# Patient Record
Sex: FEMALE | Race: WHITE | Employment: UNEMPLOYED | URBAN - METROPOLITAN AREA
[De-identification: names, ages, dates, MRNs, and addresses within clinical notes are randomized per-mention and may not be internally consistent; named-entity substitution may affect disease eponyms.]

---

## 2017-01-16 DIAGNOSIS — F41.1 GAD (GENERALIZED ANXIETY DISORDER): ICD-10-CM

## 2017-01-16 RX ORDER — GABAPENTIN 300 MG/1
CAPSULE ORAL
Qty: 60 CAPSULE | Refills: 5 | Status: SHIPPED | OUTPATIENT
Start: 2017-01-16 | End: 2017-02-20 | Stop reason: ALTCHOICE

## 2017-01-16 RX ORDER — BUPROPION HYDROCHLORIDE 150 MG/1
150 TABLET ORAL EVERY MORNING
Qty: 30 TABLET | Refills: 11 | Status: SHIPPED | OUTPATIENT
Start: 2017-01-16 | End: 2017-04-20 | Stop reason: SDUPTHER

## 2017-02-20 ENCOUNTER — PATIENT MESSAGE (OUTPATIENT)
Dept: FAMILY MEDICINE CLINIC | Age: 37
End: 2017-02-20

## 2017-02-20 ENCOUNTER — OFFICE VISIT (OUTPATIENT)
Dept: FAMILY MEDICINE CLINIC | Age: 37
End: 2017-02-20

## 2017-02-20 VITALS
HEART RATE: 76 BPM | SYSTOLIC BLOOD PRESSURE: 108 MMHG | HEIGHT: 60 IN | RESPIRATION RATE: 16 BRPM | BODY MASS INDEX: 32.89 KG/M2 | DIASTOLIC BLOOD PRESSURE: 60 MMHG | TEMPERATURE: 98 F | WEIGHT: 167.5 LBS

## 2017-02-20 DIAGNOSIS — J42 CHRONIC BRONCHITIS, UNSPECIFIED CHRONIC BRONCHITIS TYPE (HCC): ICD-10-CM

## 2017-02-20 DIAGNOSIS — F17.200 SMOKING ADDICTION: ICD-10-CM

## 2017-02-20 DIAGNOSIS — F41.1 GAD (GENERALIZED ANXIETY DISORDER): ICD-10-CM

## 2017-02-20 DIAGNOSIS — J32.0 CHRONIC MAXILLARY SINUSITIS: ICD-10-CM

## 2017-02-20 DIAGNOSIS — J34.9 PARANASAL SINUS DISEASE: ICD-10-CM

## 2017-02-20 DIAGNOSIS — M54.2 NECK PAIN: ICD-10-CM

## 2017-02-20 PROCEDURE — 99214 OFFICE O/P EST MOD 30 MIN: CPT | Performed by: NURSE PRACTITIONER

## 2017-02-20 RX ORDER — ALBUTEROL SULFATE 90 UG/1
2 AEROSOL, METERED RESPIRATORY (INHALATION) EVERY 6 HOURS PRN
Qty: 1 INHALER | Refills: 3 | Status: SHIPPED | OUTPATIENT
Start: 2017-02-20 | End: 2017-09-05 | Stop reason: SDUPTHER

## 2017-02-20 RX ORDER — BUDESONIDE AND FORMOTEROL FUMARATE DIHYDRATE 80; 4.5 UG/1; UG/1
2 AEROSOL RESPIRATORY (INHALATION) 2 TIMES DAILY
Qty: 1 INHALER | Refills: 11 | Status: SHIPPED | OUTPATIENT
Start: 2017-02-20 | End: 2018-05-29 | Stop reason: SDUPTHER

## 2017-02-20 RX ORDER — GABAPENTIN 800 MG/1
800 TABLET ORAL 2 TIMES DAILY
Qty: 60 TABLET | Refills: 2 | Status: SHIPPED | OUTPATIENT
Start: 2017-02-20 | End: 2017-03-20 | Stop reason: SDUPTHER

## 2017-02-20 RX ORDER — ETODOLAC 400 MG/1
400 TABLET, FILM COATED ORAL 2 TIMES DAILY
Qty: 30 TABLET | Refills: 2 | Status: SHIPPED | OUTPATIENT
Start: 2017-02-20 | End: 2017-06-26

## 2017-02-20 RX ORDER — KETOROLAC TROMETHAMINE 10 MG/1
10 TABLET, FILM COATED ORAL EVERY 6 HOURS PRN
Qty: 20 TABLET | Refills: 0 | Status: SHIPPED | OUTPATIENT
Start: 2017-02-20 | End: 2017-06-05 | Stop reason: SDUPTHER

## 2017-02-20 ASSESSMENT — ENCOUNTER SYMPTOMS
SHORTNESS OF BREATH: 0
COUGH: 0
NAUSEA: 0
ABDOMINAL PAIN: 0
VOMITING: 0

## 2017-02-21 RX ORDER — PSEUDOEPHEDRINE HCL 120 MG/1
120 TABLET, FILM COATED, EXTENDED RELEASE ORAL EVERY 12 HOURS
Qty: 28 TABLET | Refills: 0 | Status: SHIPPED | OUTPATIENT
Start: 2017-02-21 | End: 2017-03-17 | Stop reason: SDUPTHER

## 2017-03-02 ENCOUNTER — PATIENT MESSAGE (OUTPATIENT)
Dept: FAMILY MEDICINE CLINIC | Age: 37
End: 2017-03-02

## 2017-03-02 DIAGNOSIS — K04.7 DENTAL INFECTION: Primary | ICD-10-CM

## 2017-03-02 RX ORDER — AMOXICILLIN 875 MG/1
875 TABLET, COATED ORAL 2 TIMES DAILY
Qty: 20 TABLET | Refills: 0 | Status: SHIPPED | OUTPATIENT
Start: 2017-03-02 | End: 2017-03-12

## 2017-03-08 RX ORDER — KETOROLAC TROMETHAMINE 10 MG/1
10 TABLET, FILM COATED ORAL EVERY 6 HOURS PRN
Qty: 20 TABLET | Refills: 0 | OUTPATIENT
Start: 2017-03-08 | End: 2018-03-08

## 2017-03-17 ENCOUNTER — PATIENT MESSAGE (OUTPATIENT)
Dept: FAMILY MEDICINE CLINIC | Age: 37
End: 2017-03-17

## 2017-03-17 DIAGNOSIS — J32.0 CHRONIC MAXILLARY SINUSITIS: ICD-10-CM

## 2017-03-17 RX ORDER — PSEUDOEPHEDRINE HCL 120 MG/1
120 TABLET, FILM COATED, EXTENDED RELEASE ORAL EVERY 12 HOURS
Qty: 28 TABLET | Refills: 0 | Status: SHIPPED | OUTPATIENT
Start: 2017-03-17 | End: 2017-03-31

## 2017-03-20 RX ORDER — GABAPENTIN 800 MG/1
800 TABLET ORAL 2 TIMES DAILY
Qty: 60 TABLET | Refills: 11 | Status: SHIPPED | OUTPATIENT
Start: 2017-03-20 | End: 2017-07-06 | Stop reason: SDUPTHER

## 2017-03-30 ENCOUNTER — OFFICE VISIT (OUTPATIENT)
Dept: OTOLARYNGOLOGY | Age: 37
End: 2017-03-30

## 2017-03-30 VITALS
HEIGHT: 60 IN | SYSTOLIC BLOOD PRESSURE: 102 MMHG | BODY MASS INDEX: 30.82 KG/M2 | TEMPERATURE: 98.5 F | DIASTOLIC BLOOD PRESSURE: 78 MMHG | HEART RATE: 56 BPM | RESPIRATION RATE: 20 BRPM | WEIGHT: 157 LBS

## 2017-03-30 DIAGNOSIS — T48.5X5A RHINITIS MEDICAMENTOSA: ICD-10-CM

## 2017-03-30 DIAGNOSIS — J32.2 CHRONIC ETHMOIDAL SINUSITIS: Primary | ICD-10-CM

## 2017-03-30 DIAGNOSIS — J34.3 NASAL TURBINATE HYPERTROPHY: ICD-10-CM

## 2017-03-30 DIAGNOSIS — J31.0 RHINITIS MEDICAMENTOSA: ICD-10-CM

## 2017-03-30 DIAGNOSIS — J30.2 SEASONAL ALLERGIC RHINITIS, UNSPECIFIED ALLERGIC RHINITIS TRIGGER: ICD-10-CM

## 2017-03-30 DIAGNOSIS — F17.200 TOBACCO DEPENDENCE: ICD-10-CM

## 2017-03-30 DIAGNOSIS — K21.9 GASTROESOPHAGEAL REFLUX DISEASE WITHOUT ESOPHAGITIS: ICD-10-CM

## 2017-03-30 DIAGNOSIS — J32.0 CHRONIC MAXILLARY SINUSITIS: ICD-10-CM

## 2017-03-30 DIAGNOSIS — J32.1 CHRONIC FRONTAL SINUSITIS: ICD-10-CM

## 2017-03-30 PROCEDURE — 99243 OFF/OP CNSLTJ NEW/EST LOW 30: CPT | Performed by: OTOLARYNGOLOGY

## 2017-03-30 RX ORDER — AMOXICILLIN AND CLAVULANATE POTASSIUM 875; 125 MG/1; MG/1
1 TABLET, FILM COATED ORAL 2 TIMES DAILY
Qty: 56 TABLET | Refills: 0 | Status: SHIPPED | OUTPATIENT
Start: 2017-03-30 | End: 2017-04-27

## 2017-03-30 RX ORDER — FLUTICASONE PROPIONATE 50 MCG
1 SPRAY, SUSPENSION (ML) NASAL DAILY
Qty: 1 BOTTLE | Refills: 3 | Status: SHIPPED | OUTPATIENT
Start: 2017-03-30 | End: 2017-07-06 | Stop reason: SDUPTHER

## 2017-03-30 ASSESSMENT — ENCOUNTER SYMPTOMS
ABDOMINAL PAIN: 0
VOICE CHANGE: 0
STRIDOR: 0
CHEST TIGHTNESS: 0
VOMITING: 1
CHOKING: 0
DIARRHEA: 1
APNEA: 0
NAUSEA: 1
FACIAL SWELLING: 1
COLOR CHANGE: 0
WHEEZING: 1
SHORTNESS OF BREATH: 1
TROUBLE SWALLOWING: 0
SINUS PRESSURE: 1
RHINORRHEA: 1
COUGH: 0

## 2017-04-19 ENCOUNTER — PATIENT MESSAGE (OUTPATIENT)
Dept: FAMILY MEDICINE CLINIC | Age: 37
End: 2017-04-19

## 2017-04-19 DIAGNOSIS — F41.1 GAD (GENERALIZED ANXIETY DISORDER): ICD-10-CM

## 2017-04-20 RX ORDER — BUPROPION HYDROCHLORIDE 300 MG/1
300 TABLET ORAL EVERY MORNING
Qty: 30 TABLET | Refills: 5 | Status: SHIPPED | OUTPATIENT
Start: 2017-04-20 | End: 2017-09-05 | Stop reason: SDUPTHER

## 2017-05-19 ENCOUNTER — OFFICE VISIT (OUTPATIENT)
Dept: OTOLARYNGOLOGY | Age: 37
End: 2017-05-19

## 2017-05-19 VITALS
WEIGHT: 159.2 LBS | HEART RATE: 72 BPM | DIASTOLIC BLOOD PRESSURE: 80 MMHG | RESPIRATION RATE: 28 BRPM | BODY MASS INDEX: 31.25 KG/M2 | TEMPERATURE: 98.1 F | HEIGHT: 60 IN | SYSTOLIC BLOOD PRESSURE: 104 MMHG

## 2017-05-19 DIAGNOSIS — T48.5X5A RHINITIS MEDICAMENTOSA: ICD-10-CM

## 2017-05-19 DIAGNOSIS — J31.0 RHINITIS MEDICAMENTOSA: ICD-10-CM

## 2017-05-19 DIAGNOSIS — J34.3 NASAL TURBINATE HYPERTROPHY: ICD-10-CM

## 2017-05-19 DIAGNOSIS — K21.9 GASTROESOPHAGEAL REFLUX DISEASE WITHOUT ESOPHAGITIS: ICD-10-CM

## 2017-05-19 DIAGNOSIS — J32.1 CHRONIC FRONTAL SINUSITIS: ICD-10-CM

## 2017-05-19 DIAGNOSIS — F17.200 TOBACCO DEPENDENCE: ICD-10-CM

## 2017-05-19 DIAGNOSIS — J34.89 CONCHA BULLOSA: ICD-10-CM

## 2017-05-19 DIAGNOSIS — J32.2 CHRONIC ETHMOIDAL SINUSITIS: Primary | ICD-10-CM

## 2017-05-19 DIAGNOSIS — J32.0 CHRONIC MAXILLARY SINUSITIS: ICD-10-CM

## 2017-05-19 DIAGNOSIS — J30.2 SEASONAL ALLERGIC RHINITIS, UNSPECIFIED ALLERGIC RHINITIS TRIGGER: ICD-10-CM

## 2017-05-19 PROCEDURE — 99214 OFFICE O/P EST MOD 30 MIN: CPT | Performed by: OTOLARYNGOLOGY

## 2017-05-19 ASSESSMENT — ENCOUNTER SYMPTOMS
FACIAL SWELLING: 0
APNEA: 0
VOMITING: 0
SORE THROAT: 0
SHORTNESS OF BREATH: 0
DIARRHEA: 0
COLOR CHANGE: 0
VOICE CHANGE: 0
ABDOMINAL PAIN: 0
WHEEZING: 0
CHOKING: 0
SINUS PRESSURE: 0
TROUBLE SWALLOWING: 0
CHEST TIGHTNESS: 0
STRIDOR: 0
RHINORRHEA: 0
COUGH: 0
NAUSEA: 0

## 2017-06-05 RX ORDER — KETOROLAC TROMETHAMINE 10 MG/1
10 TABLET, FILM COATED ORAL EVERY 6 HOURS PRN
Qty: 20 TABLET | Refills: 0 | Status: SHIPPED | OUTPATIENT
Start: 2017-06-05 | End: 2017-07-06 | Stop reason: SDUPTHER

## 2017-06-26 ENCOUNTER — OFFICE VISIT (OUTPATIENT)
Dept: FAMILY MEDICINE CLINIC | Age: 37
End: 2017-06-26

## 2017-06-26 VITALS
RESPIRATION RATE: 16 BRPM | SYSTOLIC BLOOD PRESSURE: 92 MMHG | TEMPERATURE: 97.7 F | HEIGHT: 60 IN | HEART RATE: 89 BPM | DIASTOLIC BLOOD PRESSURE: 66 MMHG | BODY MASS INDEX: 32.98 KG/M2 | WEIGHT: 168 LBS | OXYGEN SATURATION: 97 %

## 2017-06-26 DIAGNOSIS — Z01.818 PREOP EXAMINATION: Primary | ICD-10-CM

## 2017-06-26 DIAGNOSIS — F17.200 SMOKING ADDICTION: ICD-10-CM

## 2017-06-26 DIAGNOSIS — J34.9 SINUS DISEASE: ICD-10-CM

## 2017-06-26 DIAGNOSIS — F31.60 BIPOLAR 1 DISORDER, MIXED (HCC): ICD-10-CM

## 2017-06-26 DIAGNOSIS — F41.1 GAD (GENERALIZED ANXIETY DISORDER): ICD-10-CM

## 2017-06-26 DIAGNOSIS — J42 CHRONIC BRONCHITIS, UNSPECIFIED CHRONIC BRONCHITIS TYPE (HCC): ICD-10-CM

## 2017-06-26 PROCEDURE — 99242 OFF/OP CONSLTJ NEW/EST SF 20: CPT | Performed by: NURSE PRACTITIONER

## 2017-06-26 RX ORDER — RISPERIDONE 2 MG/1
2 TABLET, FILM COATED ORAL DAILY
Qty: 30 TABLET | Refills: 1 | Status: SHIPPED | OUTPATIENT
Start: 2017-06-26 | End: 2017-06-30

## 2017-06-26 ASSESSMENT — ENCOUNTER SYMPTOMS
SHORTNESS OF BREATH: 0
RHINORRHEA: 1
COUGH: 0
ABDOMINAL PAIN: 0
NAUSEA: 0

## 2017-06-30 ENCOUNTER — OFFICE VISIT (OUTPATIENT)
Dept: OTOLARYNGOLOGY | Age: 37
End: 2017-06-30

## 2017-06-30 VITALS
SYSTOLIC BLOOD PRESSURE: 108 MMHG | WEIGHT: 163.3 LBS | TEMPERATURE: 98.4 F | BODY MASS INDEX: 32.06 KG/M2 | DIASTOLIC BLOOD PRESSURE: 80 MMHG | HEIGHT: 60 IN | HEART RATE: 84 BPM | RESPIRATION RATE: 16 BRPM

## 2017-06-30 DIAGNOSIS — J30.2 SEASONAL ALLERGIC RHINITIS, UNSPECIFIED ALLERGIC RHINITIS TRIGGER: ICD-10-CM

## 2017-06-30 DIAGNOSIS — T48.5X5A RHINITIS MEDICAMENTOSA: ICD-10-CM

## 2017-06-30 DIAGNOSIS — J32.1 CHRONIC FRONTAL SINUSITIS: ICD-10-CM

## 2017-06-30 DIAGNOSIS — J32.0 CHRONIC MAXILLARY SINUSITIS: ICD-10-CM

## 2017-06-30 DIAGNOSIS — J31.0 RHINITIS MEDICAMENTOSA: ICD-10-CM

## 2017-06-30 DIAGNOSIS — K21.9 GASTROESOPHAGEAL REFLUX DISEASE WITHOUT ESOPHAGITIS: ICD-10-CM

## 2017-06-30 DIAGNOSIS — J34.89 CONCHA BULLOSA: ICD-10-CM

## 2017-06-30 DIAGNOSIS — J32.2 CHRONIC ETHMOIDAL SINUSITIS: Primary | ICD-10-CM

## 2017-06-30 DIAGNOSIS — J34.3 NASAL TURBINATE HYPERTROPHY: ICD-10-CM

## 2017-06-30 DIAGNOSIS — F17.200 TOBACCO DEPENDENCE: ICD-10-CM

## 2017-06-30 PROCEDURE — 99999 PR OFFICE/OUTPT VISIT,PROCEDURE ONLY: CPT | Performed by: PHYSICIAN ASSISTANT

## 2017-06-30 ASSESSMENT — ENCOUNTER SYMPTOMS
WHEEZING: 0
RHINORRHEA: 0
BLOOD IN STOOL: 0
DIARRHEA: 0
VOMITING: 0
SINUS PRESSURE: 1
PHOTOPHOBIA: 0
RECTAL PAIN: 0
CHEST TIGHTNESS: 0
ANAL BLEEDING: 0
EYE PAIN: 0
VOICE CHANGE: 0
ABDOMINAL PAIN: 0
BACK PAIN: 0
ABDOMINAL DISTENTION: 0
STRIDOR: 0
SORE THROAT: 0
COUGH: 0
APNEA: 0
TROUBLE SWALLOWING: 0
NAUSEA: 0
COLOR CHANGE: 0
CONSTIPATION: 0
FACIAL SWELLING: 0
EYE ITCHING: 0
EYE REDNESS: 0
SHORTNESS OF BREATH: 0
CHOKING: 0
EYE DISCHARGE: 0

## 2017-07-06 DIAGNOSIS — G44.209 TENSION HEADACHE: ICD-10-CM

## 2017-07-06 RX ORDER — TIZANIDINE 4 MG/1
4 TABLET ORAL EVERY 8 HOURS PRN
Qty: 90 TABLET | Refills: 5 | Status: SHIPPED | OUTPATIENT
Start: 2017-07-06 | End: 2017-10-23 | Stop reason: SDUPTHER

## 2017-07-06 RX ORDER — GABAPENTIN 800 MG/1
800 TABLET ORAL 2 TIMES DAILY
Qty: 60 TABLET | Refills: 11 | Status: SHIPPED | OUTPATIENT
Start: 2017-07-06 | End: 2017-08-14 | Stop reason: SDUPTHER

## 2017-07-06 RX ORDER — KETOROLAC TROMETHAMINE 10 MG/1
10 TABLET, FILM COATED ORAL EVERY 6 HOURS PRN
Qty: 20 TABLET | Refills: 0 | Status: SHIPPED | OUTPATIENT
Start: 2017-07-06 | End: 2017-07-12 | Stop reason: HOSPADM

## 2017-07-06 RX ORDER — FLUTICASONE PROPIONATE 50 MCG
1 SPRAY, SUSPENSION (ML) NASAL DAILY
Qty: 1 BOTTLE | Refills: 3 | Status: SHIPPED | OUTPATIENT
Start: 2017-07-06 | End: 2017-07-12 | Stop reason: HOSPADM

## 2017-07-13 ENCOUNTER — OFFICE VISIT (OUTPATIENT)
Dept: OTOLARYNGOLOGY | Age: 37
End: 2017-07-13

## 2017-07-13 VITALS
BODY MASS INDEX: 31.5 KG/M2 | SYSTOLIC BLOOD PRESSURE: 120 MMHG | DIASTOLIC BLOOD PRESSURE: 72 MMHG | WEIGHT: 161.3 LBS | RESPIRATION RATE: 18 BRPM | HEART RATE: 78 BPM | TEMPERATURE: 98.1 F

## 2017-07-13 DIAGNOSIS — K21.9 GASTROESOPHAGEAL REFLUX DISEASE WITHOUT ESOPHAGITIS: ICD-10-CM

## 2017-07-13 DIAGNOSIS — J32.0 CHRONIC MAXILLARY SINUSITIS: ICD-10-CM

## 2017-07-13 DIAGNOSIS — J32.1 CHRONIC FRONTAL SINUSITIS: ICD-10-CM

## 2017-07-13 DIAGNOSIS — T48.5X5A RHINITIS MEDICAMENTOSA: ICD-10-CM

## 2017-07-13 DIAGNOSIS — J31.0 RHINITIS MEDICAMENTOSA: ICD-10-CM

## 2017-07-13 DIAGNOSIS — J34.89 CONCHA BULLOSA: ICD-10-CM

## 2017-07-13 DIAGNOSIS — J32.2 CHRONIC ETHMOIDAL SINUSITIS: Primary | ICD-10-CM

## 2017-07-13 DIAGNOSIS — J34.3 NASAL TURBINATE HYPERTROPHY: ICD-10-CM

## 2017-07-13 PROCEDURE — 99024 POSTOP FOLLOW-UP VISIT: CPT | Performed by: OTOLARYNGOLOGY

## 2017-07-13 ASSESSMENT — ENCOUNTER SYMPTOMS
FACIAL SWELLING: 0
STRIDOR: 0
SINUS PRESSURE: 0
RHINORRHEA: 0
SHORTNESS OF BREATH: 0
VOICE CHANGE: 0
ABDOMINAL PAIN: 0
CHOKING: 0
COLOR CHANGE: 0
CHEST TIGHTNESS: 0
NAUSEA: 0
COUGH: 0
DIARRHEA: 0
VOMITING: 0
WHEEZING: 0
SORE THROAT: 0
APNEA: 0
TROUBLE SWALLOWING: 0

## 2017-07-17 ENCOUNTER — TELEPHONE (OUTPATIENT)
Dept: ENT CLINIC | Age: 37
End: 2017-07-17

## 2017-07-17 RX ORDER — HYDROCODONE BITARTRATE AND ACETAMINOPHEN 7.5; 325 MG/1; MG/1
1 TABLET ORAL EVERY 4 HOURS PRN
Qty: 20 TABLET | Refills: 0 | Status: SHIPPED | OUTPATIENT
Start: 2017-07-17 | End: 2017-08-14 | Stop reason: ALTCHOICE

## 2017-07-20 ENCOUNTER — OFFICE VISIT (OUTPATIENT)
Dept: ENT CLINIC | Age: 37
End: 2017-07-20

## 2017-07-20 VITALS
BODY MASS INDEX: 30.51 KG/M2 | HEIGHT: 60 IN | DIASTOLIC BLOOD PRESSURE: 90 MMHG | SYSTOLIC BLOOD PRESSURE: 120 MMHG | HEART RATE: 44 BPM | WEIGHT: 155.4 LBS | RESPIRATION RATE: 20 BRPM | TEMPERATURE: 98.4 F

## 2017-07-20 DIAGNOSIS — T48.5X5A RHINITIS MEDICAMENTOSA: ICD-10-CM

## 2017-07-20 DIAGNOSIS — J32.2 CHRONIC ETHMOIDAL SINUSITIS: Primary | ICD-10-CM

## 2017-07-20 DIAGNOSIS — J34.89 CONCHA BULLOSA: ICD-10-CM

## 2017-07-20 DIAGNOSIS — Z98.890 STATUS POST FUNCTIONAL ENDOSCOPIC SINUS SURGERY (FESS): ICD-10-CM

## 2017-07-20 DIAGNOSIS — J31.0 RHINITIS MEDICAMENTOSA: ICD-10-CM

## 2017-07-20 DIAGNOSIS — J34.3 NASAL TURBINATE HYPERTROPHY: ICD-10-CM

## 2017-07-20 DIAGNOSIS — J32.0 CHRONIC MAXILLARY SINUSITIS: ICD-10-CM

## 2017-07-20 DIAGNOSIS — J32.1 CHRONIC FRONTAL SINUSITIS: ICD-10-CM

## 2017-07-20 DIAGNOSIS — F17.200 TOBACCO DEPENDENCE: ICD-10-CM

## 2017-07-20 PROCEDURE — 99024 POSTOP FOLLOW-UP VISIT: CPT | Performed by: OTOLARYNGOLOGY

## 2017-07-25 ENCOUNTER — OFFICE VISIT (OUTPATIENT)
Dept: ENT CLINIC | Age: 37
End: 2017-07-25
Payer: MEDICARE

## 2017-07-25 VITALS
TEMPERATURE: 98.8 F | HEIGHT: 60 IN | BODY MASS INDEX: 31.04 KG/M2 | DIASTOLIC BLOOD PRESSURE: 90 MMHG | SYSTOLIC BLOOD PRESSURE: 130 MMHG | RESPIRATION RATE: 16 BRPM | HEART RATE: 80 BPM | WEIGHT: 158.1 LBS

## 2017-07-25 DIAGNOSIS — J32.1 CHRONIC FRONTAL SINUSITIS: ICD-10-CM

## 2017-07-25 DIAGNOSIS — J32.2 CHRONIC ETHMOIDAL SINUSITIS: Primary | ICD-10-CM

## 2017-07-25 DIAGNOSIS — J34.89 CONCHA BULLOSA: ICD-10-CM

## 2017-07-25 DIAGNOSIS — J32.0 CHRONIC MAXILLARY SINUSITIS: ICD-10-CM

## 2017-07-25 DIAGNOSIS — Z98.890 STATUS POST FUNCTIONAL ENDOSCOPIC SINUS SURGERY (FESS): ICD-10-CM

## 2017-07-25 DIAGNOSIS — J34.3 NASAL TURBINATE HYPERTROPHY: ICD-10-CM

## 2017-07-25 DIAGNOSIS — F17.200 TOBACCO DEPENDENCE: ICD-10-CM

## 2017-07-25 DIAGNOSIS — K21.9 GASTROESOPHAGEAL REFLUX DISEASE WITHOUT ESOPHAGITIS: ICD-10-CM

## 2017-07-25 PROCEDURE — 99024 POSTOP FOLLOW-UP VISIT: CPT | Performed by: OTOLARYNGOLOGY

## 2017-07-25 PROCEDURE — 31237 NSL/SINS NDSC SURG BX POLYPC: CPT | Performed by: OTOLARYNGOLOGY

## 2017-07-25 RX ORDER — HYDROCODONE BITARTRATE AND ACETAMINOPHEN 7.5; 325 MG/1; MG/1
1 TABLET ORAL EVERY 4 HOURS PRN
Qty: 20 TABLET | Refills: 0 | OUTPATIENT
Start: 2017-07-25

## 2017-07-25 ASSESSMENT — ENCOUNTER SYMPTOMS
ABDOMINAL PAIN: 0
SINUS PRESSURE: 0
FACIAL SWELLING: 0
CHOKING: 0
STRIDOR: 0
APNEA: 0
SORE THROAT: 0
NAUSEA: 0
WHEEZING: 0
RHINORRHEA: 0
DIARRHEA: 0
CHEST TIGHTNESS: 0
SHORTNESS OF BREATH: 0
VOMITING: 0
COLOR CHANGE: 0
TROUBLE SWALLOWING: 0
VOICE CHANGE: 0
COUGH: 0

## 2017-08-08 ENCOUNTER — TELEPHONE (OUTPATIENT)
Dept: ENT CLINIC | Age: 37
End: 2017-08-08

## 2017-08-10 ENCOUNTER — OFFICE VISIT (OUTPATIENT)
Dept: ENT CLINIC | Age: 37
End: 2017-08-10

## 2017-08-10 VITALS
RESPIRATION RATE: 16 BRPM | HEART RATE: 78 BPM | WEIGHT: 159.9 LBS | TEMPERATURE: 97.9 F | DIASTOLIC BLOOD PRESSURE: 84 MMHG | BODY MASS INDEX: 31.39 KG/M2 | SYSTOLIC BLOOD PRESSURE: 124 MMHG

## 2017-08-10 DIAGNOSIS — J32.0 CHRONIC MAXILLARY SINUSITIS: ICD-10-CM

## 2017-08-10 DIAGNOSIS — J32.2 CHRONIC ETHMOIDAL SINUSITIS: ICD-10-CM

## 2017-08-10 DIAGNOSIS — J32.1 CHRONIC FRONTAL SINUSITIS: ICD-10-CM

## 2017-08-10 DIAGNOSIS — Z98.890 S/P SINUS SURGERY: Primary | ICD-10-CM

## 2017-08-10 PROCEDURE — 99024 POSTOP FOLLOW-UP VISIT: CPT | Performed by: PHYSICIAN ASSISTANT

## 2017-08-10 RX ORDER — SULFAMETHOXAZOLE AND TRIMETHOPRIM 800; 160 MG/1; MG/1
1 TABLET ORAL 2 TIMES DAILY
Qty: 56 TABLET | Refills: 0 | Status: SHIPPED | OUTPATIENT
Start: 2017-08-10 | End: 2017-09-07

## 2017-08-10 RX ORDER — SODIUM CHLORIDE/ALOE VERA
GEL (GRAM) NASAL PRN
Qty: 1 TUBE | Refills: 3 | Status: SHIPPED | OUTPATIENT
Start: 2017-08-10 | End: 2017-09-25 | Stop reason: SDUPTHER

## 2017-08-10 ASSESSMENT — ENCOUNTER SYMPTOMS
BLOOD IN STOOL: 0
COLOR CHANGE: 0
EYE ITCHING: 0
SHORTNESS OF BREATH: 0
STRIDOR: 0
WHEEZING: 0
FACIAL SWELLING: 1
DIARRHEA: 0
EYE REDNESS: 0
RHINORRHEA: 0
BACK PAIN: 0
RECTAL PAIN: 0
TROUBLE SWALLOWING: 0
SINUS PRESSURE: 0
PHOTOPHOBIA: 0
ABDOMINAL DISTENTION: 0
CHOKING: 0
NAUSEA: 0
CHEST TIGHTNESS: 0
CONSTIPATION: 0
SORE THROAT: 0
EYE DISCHARGE: 0
EYE PAIN: 0
VOMITING: 0
ABDOMINAL PAIN: 0
VOICE CHANGE: 0
ANAL BLEEDING: 0
APNEA: 0
COUGH: 0

## 2017-08-14 ENCOUNTER — TELEPHONE (OUTPATIENT)
Dept: FAMILY MEDICINE CLINIC | Age: 37
End: 2017-08-14

## 2017-08-14 ENCOUNTER — OFFICE VISIT (OUTPATIENT)
Dept: FAMILY MEDICINE CLINIC | Age: 37
End: 2017-08-14
Payer: MEDICARE

## 2017-08-14 VITALS
SYSTOLIC BLOOD PRESSURE: 116 MMHG | OXYGEN SATURATION: 98 % | BODY MASS INDEX: 31.53 KG/M2 | RESPIRATION RATE: 14 BRPM | DIASTOLIC BLOOD PRESSURE: 72 MMHG | TEMPERATURE: 98 F | HEIGHT: 60 IN | WEIGHT: 160.6 LBS | HEART RATE: 89 BPM

## 2017-08-14 DIAGNOSIS — F31.60 BIPOLAR 1 DISORDER, MIXED (HCC): Primary | ICD-10-CM

## 2017-08-14 DIAGNOSIS — J32.4 CHRONIC PANSINUSITIS: ICD-10-CM

## 2017-08-14 DIAGNOSIS — J42 CHRONIC BRONCHITIS, UNSPECIFIED CHRONIC BRONCHITIS TYPE (HCC): ICD-10-CM

## 2017-08-14 DIAGNOSIS — F17.200 SMOKING ADDICTION: ICD-10-CM

## 2017-08-14 DIAGNOSIS — M79.672 LEFT FOOT PAIN: ICD-10-CM

## 2017-08-14 DIAGNOSIS — F41.1 GAD (GENERALIZED ANXIETY DISORDER): ICD-10-CM

## 2017-08-14 PROCEDURE — 99214 OFFICE O/P EST MOD 30 MIN: CPT | Performed by: NURSE PRACTITIONER

## 2017-08-14 RX ORDER — LAMOTRIGINE 100 MG/1
100 TABLET ORAL DAILY
Qty: 30 TABLET | Refills: 1 | Status: SHIPPED | OUTPATIENT
Start: 2017-08-14 | End: 2017-09-25 | Stop reason: SDUPTHER

## 2017-08-14 RX ORDER — GABAPENTIN 800 MG/1
800 TABLET ORAL 4 TIMES DAILY
Qty: 120 TABLET | Refills: 2 | Status: SHIPPED | OUTPATIENT
Start: 2017-08-14 | End: 2017-10-23 | Stop reason: SDUPTHER

## 2017-08-14 ASSESSMENT — ENCOUNTER SYMPTOMS
VOMITING: 0
ABDOMINAL PAIN: 0
SHORTNESS OF BREATH: 0
RHINORRHEA: 1
COUGH: 0
NAUSEA: 0

## 2017-08-15 ENCOUNTER — HOSPITAL ENCOUNTER (OUTPATIENT)
Dept: GENERAL RADIOLOGY | Age: 37
Discharge: HOME | End: 2017-08-15
Payer: MEDICARE

## 2017-08-15 ENCOUNTER — HOSPITAL ENCOUNTER (OUTPATIENT)
Age: 37
Discharge: HOME | End: 2017-08-15
Payer: MEDICARE

## 2017-08-15 DIAGNOSIS — M79.672 LEFT FOOT PAIN: ICD-10-CM

## 2017-08-15 PROCEDURE — 73630 X-RAY EXAM OF FOOT: CPT

## 2017-08-16 DIAGNOSIS — M79.672 LEFT FOOT PAIN: Primary | ICD-10-CM

## 2017-08-16 RX ORDER — NAPROXEN 500 MG/1
500 TABLET ORAL 2 TIMES DAILY WITH MEALS
Qty: 30 TABLET | Refills: 0 | Status: SHIPPED | OUTPATIENT
Start: 2017-08-16 | End: 2017-09-25 | Stop reason: SDUPTHER

## 2017-08-31 ENCOUNTER — PATIENT MESSAGE (OUTPATIENT)
Dept: FAMILY MEDICINE CLINIC | Age: 37
End: 2017-08-31

## 2017-08-31 RX ORDER — KETOROLAC TROMETHAMINE 10 MG/1
10 TABLET, FILM COATED ORAL EVERY 6 HOURS PRN
Qty: 10 TABLET | Refills: 0 | Status: SHIPPED | OUTPATIENT
Start: 2017-08-31 | End: 2017-10-19 | Stop reason: SDUPTHER

## 2017-09-05 DIAGNOSIS — N32.81 OAB (OVERACTIVE BLADDER): ICD-10-CM

## 2017-09-05 DIAGNOSIS — J42 CHRONIC BRONCHITIS, UNSPECIFIED CHRONIC BRONCHITIS TYPE (HCC): ICD-10-CM

## 2017-09-05 DIAGNOSIS — F41.1 GAD (GENERALIZED ANXIETY DISORDER): ICD-10-CM

## 2017-09-05 DIAGNOSIS — G44.209 TENSION HEADACHE: ICD-10-CM

## 2017-09-05 RX ORDER — SERTRALINE HYDROCHLORIDE 100 MG/1
150 TABLET, FILM COATED ORAL DAILY
Qty: 45 TABLET | Refills: 11 | Status: SHIPPED | OUTPATIENT
Start: 2017-09-05 | End: 2017-11-14 | Stop reason: ALTCHOICE

## 2017-09-05 RX ORDER — BUPROPION HYDROCHLORIDE 300 MG/1
300 TABLET ORAL EVERY MORNING
Qty: 30 TABLET | Refills: 11 | Status: SHIPPED | OUTPATIENT
Start: 2017-09-05 | End: 2018-02-14 | Stop reason: ALTCHOICE

## 2017-09-05 RX ORDER — OXYBUTYNIN CHLORIDE 10 MG/1
10 TABLET, EXTENDED RELEASE ORAL DAILY
Qty: 30 TABLET | Refills: 11 | Status: SHIPPED | OUTPATIENT
Start: 2017-09-05 | End: 2018-02-01 | Stop reason: SDUPTHER

## 2017-09-05 RX ORDER — ALBUTEROL SULFATE 90 UG/1
2 AEROSOL, METERED RESPIRATORY (INHALATION) EVERY 6 HOURS PRN
Qty: 1 INHALER | Refills: 3 | Status: SHIPPED | OUTPATIENT
Start: 2017-09-05 | End: 2018-05-27 | Stop reason: SDUPTHER

## 2017-09-25 DIAGNOSIS — M79.672 LEFT FOOT PAIN: ICD-10-CM

## 2017-09-25 DIAGNOSIS — F31.60 BIPOLAR 1 DISORDER, MIXED (HCC): ICD-10-CM

## 2017-09-25 RX ORDER — SODIUM CHLORIDE/ALOE VERA
GEL (GRAM) NASAL PRN
Qty: 1 TUBE | Refills: 3 | Status: SHIPPED | OUTPATIENT
Start: 2017-09-25 | End: 2018-02-01 | Stop reason: SDUPTHER

## 2017-09-26 RX ORDER — LAMOTRIGINE 100 MG/1
100 TABLET ORAL DAILY
Qty: 30 TABLET | Refills: 1 | Status: SHIPPED | OUTPATIENT
Start: 2017-09-26 | End: 2017-10-19 | Stop reason: SDUPTHER

## 2017-09-26 RX ORDER — NAPROXEN 500 MG/1
500 TABLET ORAL 2 TIMES DAILY WITH MEALS
Qty: 30 TABLET | Refills: 0 | Status: SHIPPED | OUTPATIENT
Start: 2017-09-26 | End: 2018-02-01 | Stop reason: SDUPTHER

## 2017-10-05 ENCOUNTER — OFFICE VISIT (OUTPATIENT)
Dept: ENT CLINIC | Age: 37
End: 2017-10-05

## 2017-10-05 VITALS
BODY MASS INDEX: 31.18 KG/M2 | TEMPERATURE: 98.1 F | SYSTOLIC BLOOD PRESSURE: 124 MMHG | WEIGHT: 158.8 LBS | HEART RATE: 76 BPM | RESPIRATION RATE: 16 BRPM | DIASTOLIC BLOOD PRESSURE: 74 MMHG

## 2017-10-05 DIAGNOSIS — J32.1 CHRONIC FRONTAL SINUSITIS: ICD-10-CM

## 2017-10-05 DIAGNOSIS — J32.0 CHRONIC MAXILLARY SINUSITIS: ICD-10-CM

## 2017-10-05 DIAGNOSIS — Z98.890 S/P SINUS SURGERY: Primary | ICD-10-CM

## 2017-10-05 DIAGNOSIS — J34.89 CONCHA BULLOSA: ICD-10-CM

## 2017-10-05 DIAGNOSIS — J32.2 CHRONIC ETHMOIDAL SINUSITIS: ICD-10-CM

## 2017-10-05 PROCEDURE — 99024 POSTOP FOLLOW-UP VISIT: CPT | Performed by: OTOLARYNGOLOGY

## 2017-10-05 ASSESSMENT — ENCOUNTER SYMPTOMS
COLOR CHANGE: 0
SORE THROAT: 0
VOMITING: 0
CHOKING: 0
VOICE CHANGE: 0
NAUSEA: 0
CHEST TIGHTNESS: 0
FACIAL SWELLING: 0
ABDOMINAL PAIN: 0
RHINORRHEA: 0
TROUBLE SWALLOWING: 0
APNEA: 0
SHORTNESS OF BREATH: 0
STRIDOR: 0
SINUS PRESSURE: 0
WHEEZING: 0
DIARRHEA: 0
COUGH: 0

## 2017-10-05 NOTE — LETTER
[facility]  [facility] [address]  [address]  [facility]  Phone: [phone]  Fax: [phone]    Ginna Prieto MD        October 22, 2017    Jeff Hunter NP  [address] [place] [address] RYANN/ [facility]    Patient: Alejandro De Jesus   MR Number: [id number]   YOB: 1980   Date of Visit: 10/5/2017     Dear Jeff Hunter,    I recently saw your patient, Alejandro De Jesus, regarding Her nasal and sinus surgery in July. She is doing well and I have released her from care. She is very pleased. Below are the relevant portions of my assessment and plan of care. Assessment & Plan   Diagnoses and all orders for this visit:    1. S/P sinus surgery     2. Chronic ethmoidal sinusitis     3. Chronic maxillary sinusitis     4. Chronic frontal sinusitis     5. Julisa bullosa         The findings were explained and her questions were answered. She is told to continue to use the nasal irrigations at least once a day for the next 2 months. She is also told to continue the Bactrim until finished. She has healed well. Return As needed. If you have questions, please do not hesitate to call me. I look forward to following Inocencio Langston along with you.     Sincerely,          Ginna Prieto MD

## 2017-10-05 NOTE — PROGRESS NOTES
[facility]  [facility]  [address] [facility]  [address]  Dept: [phone]  Dept Fax: [phone]  Loc: [phone]    Evgeny Alaniz is a 40 y.o. female who was referred by No ref. provider found for:  Chief Complaint   Patient presents with    Post-Op Check     s/p sinus surgery, 4 week follow up   . HPI:     Evgeny Alaniz is a 40 y.o. female who presents today for post op exam, Septoplasty, bilateral complete endoscopic ethmoidectomies, bilateral maxillary antrostomies, partial resection of rigoberto bullosa of left middle turbinate on 7/12/17. She is doing well. She feels like she may still have a bit of infection going on. She is on Bactrim. History:      Allergies   Allergen Reactions    Aspirin Nausea And Vomiting     Pt has gastric ulcers    Ibuprofen Nausea And Vomiting     Pt has gastric ulcers     Current Outpatient Prescriptions   Medication Sig Dispense Refill    Sulfamethoxazole-Trimethoprim (BACTRIM DS PO) Take by mouth      naproxen (NAPROSYN) 500 MG tablet Take 1 tablet by mouth 2 times daily (with meals) 30 tablet 0    saline nasal gel (AYR) GEL by Nasal route as needed for Congestion 1 Tube 3    sertraline (ZOLOFT) 100 MG tablet Take 1.5 tablets by mouth daily 45 tablet 11    albuterol sulfate HFA (PROVENTIL HFA) 108 (90 Base) MCG/ACT inhaler Inhale 2 puffs into the lungs every 6 hours as needed for Wheezing 1 Inhaler 3    oxybutynin (DITROPAN-XL) 10 MG extended release tablet Take 1 tablet by mouth daily 30 tablet 11    buPROPion (WELLBUTRIN XL) 300 MG extended release tablet Take 1 tablet by mouth every morning 30 tablet 11    gabapentin (NEURONTIN) 800 MG tablet Take 1 tablet by mouth 4 times daily 120 tablet 2    tiZANidine (ZANAFLEX) 4 MG tablet Take 1 tablet by mouth every 8 hours as needed (pain) 90 tablet 5    Probiotic Product (PROBIOTIC PO) Take by mouth daily      budesonide-formoterol (SYMBICORT) 80-4.5 MCG/ACT hearing loss, mouth sores, nosebleeds, postnasal drip, rhinorrhea, sinus pressure, sneezing, sore throat, tinnitus, trouble swallowing and voice change. Eyes: Negative for visual disturbance. Respiratory: Negative for apnea, cough, choking, chest tightness, shortness of breath, wheezing and stridor. Cardiovascular: Negative for chest pain, palpitations and leg swelling. Gastrointestinal: Negative for abdominal pain, diarrhea, nausea and vomiting. Endocrine: Negative for cold intolerance, heat intolerance, polydipsia and polyuria. Genitourinary: Negative for dysuria, enuresis and hematuria. Musculoskeletal: Negative for arthralgias, gait problem, neck pain and neck stiffness. Skin: Negative for color change and rash. Allergic/Immunologic: Negative for environmental allergies, food allergies and immunocompromised state. Neurological: Negative for dizziness, syncope, facial asymmetry, speech difficulty, light-headedness and headaches. Hematological: Negative for adenopathy. Does not bruise/bleed easily. Psychiatric/Behavioral: Negative for confusion and sleep disturbance. The patient is not nervous/anxious. Objective:     /74 (Site: Right Arm, Position: Sitting)   Pulse 76   Temp 98.1 °F (36.7 °C) (Oral)   Resp 16   Wt 158 lb 12.8 oz (72 kg)   LMP  (Exact Date)   BMI 31.18 kg/m²     Physical Exam   Nose: Nasal fossa has normal appearance without any drainage or erythema. Data:  All of the past medical history, past surgical history, family history, social history, allergies and current medications were reviewed with the patient. Assessment & Plan   Diagnoses and all orders for this visit:    1. S/P sinus surgery     2. Chronic ethmoidal sinusitis     3. Chronic maxillary sinusitis     4. Chronic frontal sinusitis     5. Julisa bullosa         The findings were explained and her questions were answered.  She is told to continue to use the nasal irrigations at least once

## 2017-10-05 NOTE — MR AVS SNAPSHOT
After Visit Summary             Michelle Del Valle   10/5/2017 10:00 AM   Office Visit    Description:  Female : 1980   Provider:  Shu Best MD   Department:  [facility]              Your Follow-Up and Future Appointments         Below is a list of your follow-up and future appointments. This may not be a complete list as you may have made appointments directly with providers that we are not aware of or your providers may have made some for you. Please call your providers to confirm appointments. It is important to keep your appointments. Please bring your current insurance card, photo ID, co-pay, and all medication bottles to your appointment. If self-pay, payment is expected at the time of service. Your To-Do List     Future Appointments Provider Department Dept Phone    2017 9:45 AM Delisa Vasquez NP [place] [phone]    Please arrive 15 minutes prior to appointment, bring photo ID and insurance card. Please arrive 15 minutes prior to appointment, bring photo ID and insurance card. Information from Your Visit        Department     Name Address Phone Fax    [facility] [address] [facility]  [address] [phone]      You Were Seen for:         Comments    S/P sinus surgery   [[id number]]         Vital Signs     Blood Pressure Pulse Temperature Respirations Weight Last Menstrual Period    124/74 (Site: Right Arm, Position: Sitting) 76 98.1 °F (36.7 °C) (Oral) 16 158 lb 12.8 oz (72 kg) (Exact Date)    Body Mass Index Smoking Status                31.18 kg/m2 Current Every Day Smoker          Additional Information about your Body Mass Index (BMI)           Your BMI as listed above is considered obese (30 or more). BMI is an estimate of body fat, calculated from your height and weight.   The higher your BMI, the greater your risk of heart disease, high blood pressure, Ibuprofen Nausea And Vomiting    Pt has gastric ulcers         Additional Information        Basic Information     Date Of Birth Sex Race Ethnicity Preferred Language    1980 Female White Non-/Non  English      Problem List as of 10/5/2017  Date Reviewed: 10/5/2017                Chronic pansinusitis    ANTHONY (generalized anxiety disorder)    Chronic migraine    Smoking addiction    COPD (chronic obstructive pulmonary disease) ([facility] [address]      Your Goals as of 10/5/2017 at 10:44 AM              Today    8/14/17    8/10/17       Blood Pressure    Blood Pressure < 140/90   124/74  116/72  124/84      Preventive Care        Date Due    HIV screening is recommended for all people regardless of risk factors  aged 15-65 years at least once (lifetime) who have never been HIV tested. 2/26/1996    Tetanus Combination Vaccine (1 - Tdap) 2/26/2000    Pneumococcal Vaccine - Pneumovax for adults aged 19-64 years with: chronic heart disease, chronic lung disease, diabetes mellitus, alcoholism, chronic liver disease, or cigarette smoking. (1 of 1 - PPSV23) 2/26/2000    Yearly Flu Vaccine (1) 9/1/2017    Pap Smear 7/10/2018            My[employer] Signup           Our records indicate that you have an active Vtrim account. You can view your After Visit Summary by going to [email or web address] and logging in with your Vtrim username and password. If you don't have a Vtrim username and password but a parent or guardian has access to your record, the parent or guardian should login with their own Vtrim username and password and access your record to view the After Visit Summary. Additional Information  If you have questions, please contact the physician practice where you receive care. Remember, Vtrim is NOT to be used for urgent needs. For medical emergencies, dial 911. For questions regarding your Vtrim account call [phone].  If you have a clinical question, please call your doctor's office.

## 2017-10-09 ENCOUNTER — PATIENT MESSAGE (OUTPATIENT)
Dept: ENT CLINIC | Age: 37
End: 2017-10-09

## 2017-10-09 NOTE — TELEPHONE ENCOUNTER
From: Spencer Granados  To: MARU Alvarez  Sent: 10/9/2017 2:29 PM EDT  Subject: Prescription Question    Is there any way I can get a refill of the Bactrim. It helps with the sinus infection I'm still fighting.  I've got 2 pills left

## 2017-10-10 NOTE — TELEPHONE ENCOUNTER
Giorgio Alvarez-    Dr Gurpreet Reece would like you to finish the Bactrim that you have left, then continue on the saline rinses. If you are still having symptoms, we will schedule you for an appointment to see him and possibly take a nasal culture. Please let us know by calling the office at [phone].      EVA Gold

## 2017-10-19 DIAGNOSIS — F31.60 BIPOLAR 1 DISORDER, MIXED (HCC): ICD-10-CM

## 2017-10-19 DIAGNOSIS — M79.672 LEFT FOOT PAIN: ICD-10-CM

## 2017-10-19 RX ORDER — KETOROLAC TROMETHAMINE 10 MG/1
10 TABLET, FILM COATED ORAL EVERY 6 HOURS PRN
Qty: 10 TABLET | Refills: 0 | Status: SHIPPED | OUTPATIENT
Start: 2017-10-19 | End: 2017-11-27 | Stop reason: SDUPTHER

## 2017-10-19 RX ORDER — NAPROXEN 500 MG/1
500 TABLET ORAL 2 TIMES DAILY WITH MEALS
Qty: 30 TABLET | Refills: 0 | OUTPATIENT
Start: 2017-10-19

## 2017-10-19 RX ORDER — LAMOTRIGINE 100 MG/1
100 TABLET ORAL DAILY
Qty: 30 TABLET | Refills: 5 | Status: SHIPPED | OUTPATIENT
Start: 2017-10-19 | End: 2017-11-14 | Stop reason: SDUPTHER

## 2017-10-19 NOTE — TELEPHONE ENCOUNTER
From: [place]  Sent: 10/19/2017 10:28 AM EDT  Subject: Medication Renewal Request    Michelle Swift.  Nasir Certain would like a refill of the following medications:  ketorolac (TORADOL) 10 MG tablet [Chadd Maddox NP]  naproxen (NAPROSYN) 500 MG tablet Fidelia Brink NP]    [address] [facility] [phone]    Comment:

## 2017-10-20 ENCOUNTER — PATIENT MESSAGE (OUTPATIENT)
Dept: FAMILY MEDICINE CLINIC | Age: 37
End: 2017-10-20

## 2017-10-23 ENCOUNTER — PATIENT MESSAGE (OUTPATIENT)
Dept: FAMILY MEDICINE CLINIC | Age: 37
End: 2017-10-23

## 2017-10-23 DIAGNOSIS — G44.209 TENSION HEADACHE: ICD-10-CM

## 2017-10-23 RX ORDER — GABAPENTIN 800 MG/1
800 TABLET ORAL 4 TIMES DAILY
Qty: 120 TABLET | Refills: 2 | Status: SHIPPED | OUTPATIENT
Start: 2017-10-23 | End: 2018-01-18 | Stop reason: SDUPTHER

## 2017-10-23 RX ORDER — TIZANIDINE 4 MG/1
4 TABLET ORAL EVERY 8 HOURS PRN
Qty: 90 TABLET | Refills: 5 | Status: SHIPPED | OUTPATIENT
Start: 2017-10-23 | End: 2018-02-20 | Stop reason: SDUPTHER

## 2017-10-23 NOTE — TELEPHONE ENCOUNTER
From: Evgeny Alaniz  Sent: 10/21/2017 10:47 AM EDT  Subject: Medication Renewal Request    Bryanna Man. Aixa Bellamy would like a refill of the following medications:  tiZANidine (ZANAFLEX) 4 MG tablet [Chadd Maddox NP]  gabapentin (NEURONTIN) 800 MG tablet Katy Wilson NP]    Preferred pharmacy: [facility] [address] [place] [address] [address] [phone] - F [phone]    Comment:  Last fills happened sept. 16 n October 5.  Need new scripts please

## 2017-10-24 ENCOUNTER — HOSPITAL ENCOUNTER (OUTPATIENT)
Dept: PHYSICAL THERAPY | Age: 37
Setting detail: THERAPIES SERIES
Discharge: HOME | End: 2017-10-24
Payer: MEDICARE

## 2017-10-24 PROCEDURE — 97110 THERAPEUTIC EXERCISES: CPT

## 2017-10-24 PROCEDURE — 97161 PT EVAL LOW COMPLEX 20 MIN: CPT

## 2017-10-24 ASSESSMENT — PAIN SCALES - GENERAL: PAINLEVEL_OUTOF10: 7

## 2017-10-24 ASSESSMENT — PAIN DESCRIPTION - LOCATION: LOCATION: HEAD;NECK

## 2017-10-24 ASSESSMENT — PAIN DESCRIPTION - PAIN TYPE: TYPE: CHRONIC PAIN

## 2017-10-24 NOTE — PROGRESS NOTES
I certify that I have examined the patient below and determined that Physical Medicine and Rehabilitation service is necessary; that the secondary diagnosis for the provision of rehabilitation services is consistent with identified needs; that service will be furnished on an outpatient basis while the patient is in my care; that I approve the above plan of care for up to 90 days or as specifically noted above and will review it within that time frame or more often if the patients condition requires. Attestation, signature or co-signature of physician indicates approval of certification requirements.    ________________________ ____________ __________  Physician Signature   Date   Time       [address] [facility]     Time In: 6658  Time Out: 1505  Minutes: 60  Timed Code Treatment Minutes: 15 Minutes     Date: 10/24/2017  Patient Name: Darlyn Mckinney,  Gender:  female        CSN: [id number]   : 1980  (40 y.o.)        Referring Practitioner: Madison Paz DO      Diagnosis: cervicogenic HA, bilateral occipital neuralgia  Treatment Diagnosis: cervicogenic HA, bilateral occipital neuralgia  Additional Pertinent Hx: history of COPD, asthma, depression, migraines. has had complete hysterectomy       General:  PT Visit Information  Onset Date: 10/24/17  PT Insurance Information: [employer]. 30 visits/calendar year, ionto/HP/CP not covered  Total # of Visits Approved: 30  Total # of Visits to Date: 1  Plan of Care/Certification Expiration Date: 17  Progress Note Due Date: 17  Progress Note Counter: 1/10        Restrictions/Precautions: General Precautions    See Medical History Questionnaire for information related to allergies and medications. Subjective:  Chart Reviewed: Yes  Patient assessed for rehabilitation services?: Yes  Family / Caregiver Present: No  Comments: next physician visit PRN.  symptoms no cervical protraction or retraction, with supine cervical retraction-slight increase in R neck/upper trap discomfort                 Exercises  Exercise 1: cervical roll X 5 min-decreased HA to 4.5/10. also use lumbar roll at home  Exercise 2: instruction on posturing into neutral position, effects of fwd head on HA and neck pain X 5 min  Exercise 3: geltle occipital release karen R, positional release to R upper trap and levator scap-slight increase in neck pain but HA decreased to 3/10          Activity Tolerance:  Activity Tolerance: Patient Tolerated treatment well, Patient limited by pain    Assessment: Body structures, Functions, Activity limitations: Decreased functional mobility   Assessment: patient has limitation related to diagnosis. Symptpoms appear eased today with postural correction, cervical roll, occipital release  Prognosis: Good  REQUIRES PT FOLLOW UP: Yes    Patient Education:  Patient Education: cervical roll supine and with sleeping every 2 hours. lumbar roll, supine cervical retraction  Barriers to Learning: none    Plan:  Times per week: 2-3X/week  Plan weeks: 4-6 weeks  Specific instructions for Next Treatment: manual therapy/massage PRN.  ROM, stretching, strength, upper core strength, posturing  Current Treatment Recommendations: Strengthening, ROM, Functional Mobility Training, IADL Training, Manual Therapy - Joint Manipulation, Manual Therapy - Soft Tissue Mobilization, Neuromuscular Re-education, Pain Management, Home Exercise Program, Safety Education & Training, Modalities, Equipment Evaluation, Education, & procurement, Patient/Caregiver Education & Training  Plan Comment: POC initiated    History: Personal factors or comorbidities that impact plan of care - Low Complexity: no personal factors or comorbidities    Examination: Body structures and functions, activity limitations, participation restrictions; using standardized tests and measures - Low Complexity: 1-2 body structures and

## 2017-10-26 ENCOUNTER — HOSPITAL ENCOUNTER (OUTPATIENT)
Dept: PHYSICAL THERAPY | Age: 37
Setting detail: THERAPIES SERIES
End: 2017-10-26
Payer: MEDICARE

## 2017-10-31 ENCOUNTER — APPOINTMENT (OUTPATIENT)
Dept: PHYSICAL THERAPY | Age: 37
End: 2017-10-31
Payer: MEDICARE

## 2017-11-01 ENCOUNTER — HOSPITAL ENCOUNTER (OUTPATIENT)
Dept: PHYSICAL THERAPY | Age: 37
Setting detail: THERAPIES SERIES
Discharge: HOME | End: 2017-11-01
Payer: MEDICARE

## 2017-11-02 ENCOUNTER — HOSPITAL ENCOUNTER (OUTPATIENT)
Dept: PHYSICAL THERAPY | Age: 37
Setting detail: THERAPIES SERIES
Discharge: HOME | End: 2017-11-02
Payer: MEDICARE

## 2017-11-02 PROCEDURE — 97140 MANUAL THERAPY 1/> REGIONS: CPT

## 2017-11-02 PROCEDURE — 97110 THERAPEUTIC EXERCISES: CPT

## 2017-11-02 ASSESSMENT — PAIN DESCRIPTION - LOCATION: LOCATION: HEAD;NECK;SHOULDER

## 2017-11-02 ASSESSMENT — PAIN SCALES - GENERAL: PAINLEVEL_OUTOF10: 8

## 2017-11-02 ASSESSMENT — PAIN DESCRIPTION - PAIN TYPE: TYPE: CHRONIC PAIN

## 2017-11-02 ASSESSMENT — PAIN DESCRIPTION - ORIENTATION: ORIENTATION: RIGHT;LEFT

## 2017-11-02 NOTE — PROGRESS NOTES
[place]     Time In: 9149  Time Out: 1430  Minutes: 45  Timed Code Treatment Minutes: 45 Minutes     Date: 2017  Patient Name: Marta Hunter,  Gender:  female        CSN: [id number]   : 1980  (40 y.o.)       Referring Practitioner: Ivis Quiroga DO      Diagnosis: cervicogenic HA, bilateral occipital neuralgia  Treatment Diagnosis: cervicogenic HA, bilateral occipital neuralgia   Additional Pertinent Hx: history of COPD, asthma, depression, migraines. has had complete hysterectomy                  General:  PT Visit Information  PT Insurance Information: [place] [employer]. 30 visits/calendar year, ionto/HP/CP not covered  Total # of Visits Approved: 30  Total # of Visits to Date: 2  Plan of Care/Certification Expiration Date: 17  Progress Note Counter: 2/10               Subjective:  Chart Reviewed: Yes  Patient assessed for rehabilitation services?: Yes  Family / Caregiver Present: No  Comments: next physician visit PRN. symptoms no increase with any specific activity/incident. light and noise with severe HA,   Other (Comment): script: neck PT for cervicogenic Ha and bilateral occipital neuralgia     Subjective: Patient reports felt good while therapist was working on during eval but as soon as left and walked out she had horrible pain and tightness. States the next morning she could not move her neck and had to have her daughter work on her neck to get her to move.  is trying to do her HEP but some of the exercises make her feel worse due to the nerve bundle still in her neck.  has to go back to [place] to get Botox injections but  had increased pain in right side with bruising in right leg and the injection sites swelled up to the size of golf balls and were blededing more to the left side.   also is having issues since getting the injections with almost having bi-polar reactions and will get very mad for no reason. Pain:  Patient Currently in Pain: Yes  Pain Assessment: 0-10  Pain Level: 8  Pain Type: Chronic pain  Pain Location: Head, Neck, Shoulder  Pain Orientation: Right, Left  Pain Radiating Towards: headache - throughout entire head - states took medication. Objective    Exercises  Exercise 3: Gentle manual traction 5x10 seconds - produced pain if pulled too much. gentle occipital release produced tingling into left side scapula. light myofascial work to right SCM and bilateral temporalis. Activity Tolerance:  Activity Tolerance: Patient Tolerated treatment well  Activity Tolerance: Patient reported pressure in left side head and neck but no pain after treatment. Assessment: Body structures, Functions, Activity limitations: Decreased functional mobility   Assessment: Unsure as to the cause of patient's reaction to her Botox injections. Patient's increased tightness in neck after last session could have been due to muscles not able to fully relax yet and tightening up after manual work. Patient's daughter talked most of session and did not let patient talk for herself and most of information was about her and not the patient. Patient and daughter very unhappy about patient's reaction to injections. Patient having radom responses to treatments and unsure what is the cause. Prognosis: Good  Discharge Recommendations: Continue to assess pending progress    Patient Education:  Patient Education: See how feels after today's treatment. Tell doctors about reaction to injections and if happens again then needs to take pictures. Plan:  Times per week: 2-3X/week  Plan weeks: 4-6 weeks  Specific instructions for Next Treatment: manual therapy/massage PRN.  ROM, stretching, strength, upper core strength, posturing  Current Treatment Recommendations: Strengthening, ROM, Functional Mobility Training, IADL Training, Manual Therapy - Joint

## 2017-11-03 ENCOUNTER — PATIENT MESSAGE (OUTPATIENT)
Dept: FAMILY MEDICINE CLINIC | Age: 37
End: 2017-11-03

## 2017-11-03 DIAGNOSIS — K04.7 DENTAL ABSCESS: Primary | ICD-10-CM

## 2017-11-06 ENCOUNTER — HOSPITAL ENCOUNTER (OUTPATIENT)
Dept: PHYSICAL THERAPY | Age: 37
Setting detail: THERAPIES SERIES
Discharge: HOME | End: 2017-11-06
Payer: MEDICARE

## 2017-11-06 PROCEDURE — 97110 THERAPEUTIC EXERCISES: CPT

## 2017-11-06 PROCEDURE — 97140 MANUAL THERAPY 1/> REGIONS: CPT

## 2017-11-06 RX ORDER — AMOXICILLIN 875 MG/1
875 TABLET, COATED ORAL 2 TIMES DAILY
Qty: 20 TABLET | Refills: 0 | Status: SHIPPED | OUTPATIENT
Start: 2017-11-06 | End: 2017-11-16

## 2017-11-06 ASSESSMENT — PAIN SCALES - GENERAL: PAINLEVEL_OUTOF10: 9

## 2017-11-06 NOTE — PROGRESS NOTES
[place]     Time In: 7125  Time Out: Dorian  Minutes: 39  Timed Code Treatment Minutes: 39 Minutes     Date: 2017  Patient Name: Shay Luna,  Gender:  female        CSN: [id number]   : 1980  (40 y.o.)       Referring Practitioner: Billy Encinas DO      Diagnosis: cervicogenic HA, bilateral occipital neuralgia  Treatment Diagnosis: cervicogenic HA, bilateral occipital neuralgia   Additional Pertinent Hx: history of COPD, asthma, depression, migraines. has had complete hysterectomy        General:  PT Visit Information  PT Insurance Information: [employer]. 30 visits/calendar year, ionto/HP/CP not covered  Total # of Visits Approved: 30  Total # of Visits to Date: 3  Plan of Care/Certification Expiration Date: 17  Progress Note Counter: 3/10 for progress note             Subjective:  Chart Reviewed: Yes  Patient assessed for rehabilitation services?: Yes  Family / Caregiver Present: No  Comments: next physician visit PRN. symptoms no increase with any specific activity/incident. light and noise with severe HA,   Other (Comment): script: neck PT for cervicogenic Ha and bilateral occipital neuralgia     Subjective: Patient states she had horrible pain a few hours after last therapy session. Reports she had to cancel her Botox injection scheduled for this coming Thursday but had to cancel because of transportation. Pain:  Patient Currently in Pain: Yes  Pain Assessment: 0-10  Pain Level: 9 (Neck, bilateral upper traps, headache)    Objective         Exercises  Exercise 1: Reviewed unloading with cervical towel roll and use of towel roll for sleeping position, use of lumbar roll and correct sitting posture.    Exercise 4: Gentle cervical range of motion: sidebending and rotation x10 each - no pain  Exercise 5: Seated with towel roll at low back: Manual therapy with Hawk  x5 minutes total to bilateral upper traps, cervical and upperthoracic paraspinals, and suboccipital region using HG8 and HG9 followed my light myofascial work to same regions along with Right SCM and bilateral temporalis x10 minutes. Activity Tolerance:  Activity Tolerance: Patient Tolerated treatment well  Activity Tolerance: Neck pain and headache at occiput decreased to 6/10 and headache at frontal and temporal regions abolished. Assessment: Body structures, Functions, Activity limitations: Decreased functional mobility   Assessment: Patient has difficulty relaxing with manual work. Held manual traction and occipital release today due to patient having increase pain after last therapy session. Trialed use of Ayla Peel for decreased tension and tightness. Prognosis: Good  Discharge Recommendations: Continue to assess pending progress    Patient Education:  Patient Education: Use of heat/ice at home. Work on gentle ROM sidebending and rotation. Monitor relief after Ayla Peel and myofascial work. Plan:  Times per week: 2-3X/week  Plan weeks: 4-6 weeks  Specific instructions for Next Treatment: manual therapy/massage PRN.  ROM, stretching, strength, upper core strength, posturing  Current Treatment Recommendations: Strengthening, ROM, Functional Mobility Training, IADL Training, Manual Therapy - Joint Manipulation, Manual Therapy - Soft Tissue Mobilization, Neuromuscular Re-education, Pain Management, Home Exercise Program, Safety Education & Training, Modalities, Equipment Evaluation, Education, & procurement, Patient/Caregiver Education & Training  Plan Comment: Continue with POC    Goals:  Patient goals : less pain    Short term goals  Time Frame for Short term goals: 4 weeks  Short term goal 1: decrease reports of pain average <4/10, HA <3/10 average  tolerate all household chores and work on computer  Short term goal 2: improve posturing to neutral with sitting and standing with <3VC/visit     Long term

## 2017-11-08 ENCOUNTER — HOSPITAL ENCOUNTER (OUTPATIENT)
Dept: PHYSICAL THERAPY | Age: 37
Setting detail: THERAPIES SERIES
Discharge: HOME | End: 2017-11-08
Payer: MEDICARE

## 2017-11-08 PROCEDURE — 97140 MANUAL THERAPY 1/> REGIONS: CPT

## 2017-11-08 PROCEDURE — 97110 THERAPEUTIC EXERCISES: CPT

## 2017-11-08 ASSESSMENT — PAIN SCALES - GENERAL: PAINLEVEL_OUTOF10: 4

## 2017-11-14 ENCOUNTER — OFFICE VISIT (OUTPATIENT)
Dept: FAMILY MEDICINE CLINIC | Age: 37
End: 2017-11-14
Payer: MEDICARE

## 2017-11-14 ENCOUNTER — HOSPITAL ENCOUNTER (OUTPATIENT)
Dept: PHYSICAL THERAPY | Age: 37
Setting detail: THERAPIES SERIES
Discharge: HOME | End: 2017-11-14
Payer: MEDICARE

## 2017-11-14 VITALS
RESPIRATION RATE: 16 BRPM | HEIGHT: 64 IN | DIASTOLIC BLOOD PRESSURE: 64 MMHG | HEART RATE: 76 BPM | BODY MASS INDEX: 26.91 KG/M2 | OXYGEN SATURATION: 98 % | SYSTOLIC BLOOD PRESSURE: 118 MMHG | WEIGHT: 157.6 LBS

## 2017-11-14 DIAGNOSIS — J42 CHRONIC BRONCHITIS, UNSPECIFIED CHRONIC BRONCHITIS TYPE (HCC): ICD-10-CM

## 2017-11-14 DIAGNOSIS — F17.200 SMOKING ADDICTION: ICD-10-CM

## 2017-11-14 DIAGNOSIS — F31.9 BIPOLAR 1 DISORDER (HCC): ICD-10-CM

## 2017-11-14 DIAGNOSIS — F41.1 GAD (GENERALIZED ANXIETY DISORDER): ICD-10-CM

## 2017-11-14 DIAGNOSIS — N32.81 OAB (OVERACTIVE BLADDER): ICD-10-CM

## 2017-11-14 DIAGNOSIS — F31.60 BIPOLAR 1 DISORDER, MIXED (HCC): ICD-10-CM

## 2017-11-14 DIAGNOSIS — J32.4 CHRONIC PANSINUSITIS: ICD-10-CM

## 2017-11-14 DIAGNOSIS — Z23 NEED FOR INFLUENZA VACCINATION: ICD-10-CM

## 2017-11-14 PROCEDURE — G8926 SPIRO NO PERF OR DOC: HCPCS | Performed by: NURSE PRACTITIONER

## 2017-11-14 PROCEDURE — G8417 CALC BMI ABV UP PARAM F/U: HCPCS | Performed by: NURSE PRACTITIONER

## 2017-11-14 PROCEDURE — G8484 FLU IMMUNIZE NO ADMIN: HCPCS | Performed by: NURSE PRACTITIONER

## 2017-11-14 PROCEDURE — 3023F SPIROM DOC REV: CPT | Performed by: NURSE PRACTITIONER

## 2017-11-14 PROCEDURE — G8427 DOCREV CUR MEDS BY ELIG CLIN: HCPCS | Performed by: NURSE PRACTITIONER

## 2017-11-14 PROCEDURE — 90471 IMMUNIZATION ADMIN: CPT | Performed by: NURSE PRACTITIONER

## 2017-11-14 PROCEDURE — 90688 IIV4 VACCINE SPLT 0.5 ML IM: CPT | Performed by: NURSE PRACTITIONER

## 2017-11-14 PROCEDURE — 99214 OFFICE O/P EST MOD 30 MIN: CPT | Performed by: NURSE PRACTITIONER

## 2017-11-14 PROCEDURE — 4004F PT TOBACCO SCREEN RCVD TLK: CPT | Performed by: NURSE PRACTITIONER

## 2017-11-14 RX ORDER — LAMOTRIGINE 150 MG/1
150 TABLET ORAL DAILY
Qty: 30 TABLET | Refills: 2 | Status: SHIPPED | OUTPATIENT
Start: 2017-11-14 | End: 2018-02-14 | Stop reason: SDUPTHER

## 2017-11-14 ASSESSMENT — ENCOUNTER SYMPTOMS
COUGH: 0
NAUSEA: 0
VOMITING: 0
SHORTNESS OF BREATH: 0
RHINORRHEA: 1
ABDOMINAL PAIN: 0

## 2017-11-14 NOTE — PATIENT INSTRUCTIONS
You may receive a survey about your visit with us today. The feedback from our patients helps us identify what is working well and where the service to all patients can be enhanced. Thank you! Tobacco Cessation Programs     Telephonic behavior modification  Pepe Modi ([phone])   Counseling service for those who are ready to quit using tobacco.     Available for uninsured [place] residents, [place] recipients, pregnant women, or patients whose health plans or employers are members of the [address] [place] [address] behavior modification   http://[place]. Quitlogix. org   Online support program to help patients through each step of the quitting process. Available 24 hours a day 7 days a week. Provides up to date researched based tool, step-by-step guides, and motivational messages. Online behavior modification   www.lungusa.org/stop-smoking/how-to-quit   HelpLine: -800-LUNG-[place] (238-5668)   Email questions to:  María@beModel. org    Website offers resources to help tobacco users figure out their reasons for quitting and then take the big step of quitting for good. Hypnosis   Location: [facility], [facility]   Contact: Bora Amador, PhD at [phone]   Hypnosis for tobacco cessation   Cost $225 for the initial session and $175 for each session afterwards. Most patients require 6-8 sessions. There is the option to submit through the patients insurance. Hypnosis and behavior modification   Location: [facility],  [address] [facility] NOWAK [employer].[place], [place]   Contact: Monica Duque, PhD at [phone]  [employer] Counsel[facility] and hypnosis for nicotine addition   Cost: For uninsured patients:  Please call above phone number  Cost for insured patients depends on patients insurance plan.     Behavior modification   Location: [facility] [address], [address] [facility] [address] [place] [employer] NOWABBY [place], [address]: [place] [address] include four one-on-one appointments between the patient and a

## 2017-11-14 NOTE — PROGRESS NOTES
Patient was given fluzone Quadrivalent vaccine 0.5 ml IM in right deltoid per aj Baires NP, given by Carmen Keen CMA ([facility]). Patient tolerated well. VIS given and reviewed.   NDC# 39805-712-72  LOT# [id number] Cope  Exp: 6/30/18

## 2017-11-14 NOTE — PROGRESS NOTES
Subjective:      Patient ID: Jona Scott is a 40 y.o. female. HPI  3 month Follow Up    Chief Complaint   Patient presents with    3 Month Follow-Up     mood       Patient Active Problem List   Diagnosis    Chronic migraine    Smoking addiction    COPD (chronic obstructive pulmonary disease) ([address]    ANTHONY (generalized anxiety disorder)    Chronic pansinusitis       Chronic Migraines. Family hx. On Gabapentin 800 mg QID. Failed on Elavil, Topamax and multiple rx abortive therapy. Zanaflex beneficial for tension aspect from stress. Did see the [address] and did received Botox INJ. No benefit. Was going through therapy as well for neck pain related to HA and this has shown no benefit. Mood improved. Anxiety and irritable better - not as snappy to her children. On Lamictal 100 mg and Wellbutrin 300 mg XL. Has been on Celexa, Prozac, Zoloft, Lexapro, Effexor, Cymbalta, Abilify, Risperdal and Buspar in past.     OAB stable on Ditropan    Denies CP, SOB or chest tightness. Stable on Symbicort.   Continues to smoke 1/2 ppd      BP Readings from Last 3 Encounters:   11/14/17 118/64   10/05/17 124/74   08/14/17 116/72        BP wnl    Labs reviewed    No results found for: LABA1C  No results found for: EAG    No components found for: CHLPL  Lab Results   Component Value Date    TRIG 94 07/02/2015     Lab Results   Component Value Date    HDL 39 07/02/2015     Lab Results   Component Value Date    LDLCALC 110 07/02/2015     No results found for: LABVLDL      Chemistry        Component Value Date/Time     07/02/2015 1154    K 4.4 07/02/2015 1154     07/02/2015 1154    CO2 26 07/02/2015 1154    BUN 16 07/02/2015 1154    CREATININE 0.7 07/02/2015 1154        Component Value Date/Time    CALCIUM 9.1 07/02/2015 1154    ALKPHOS 70 07/02/2015 1154    AST 12 07/02/2015 1154    ALT 10 (L) 07/02/2015 1154    BILITOT 0.3 07/02/2015 1154            No results found for: TSH, N2XLKWI, T6DWOBW, wheezes. She has no rhonchi. Abdominal: Soft. Bowel sounds are normal. There is no tenderness. Neurological: She is alert and oriented to person, place, and time. She has normal strength. No cranial nerve deficit or sensory deficit. She displays a negative Romberg sign. Coordination and gait normal.   Psychiatric: Her mood appears anxious. She is hyperactive. She is not slowed and not withdrawn. Cognition and memory are normal. She expresses impulsivity. She does not exhibit a depressed mood. Assessment:      1. Chronic migraine  [facility] Byron Caceres MD   2. Bipolar 1 disorder ([facility]     3. ANTHONY (generalized anxiety disorder)     4. Chronic bronchitis, unspecified chronic bronchitis type ([facility]     5. Smoking addiction     6. Chronic pansinusitis     7. Bipolar 1 disorder, mixed (HCC)  lamoTRIgine (LAMICTAL) 150 MG tablet   8. OAB (overactive bladder)     9.  Need for influenza vaccination  INFLUENZA, QUADV, 3 YRS AND OLDER, IM, MDV, 0.5ML ([facility])           Plan:       Refer to Dr. Omkar Nash for botox INJ in [address] [place] [address] with [facility] for now  Mood improved - increase Lamictal 150 mg Daily  Chronic conditions stable  Labs reviewed  FLU in office  RTO in 3 months

## 2017-11-14 NOTE — PROGRESS NOTES
Visit Information    Have you changed or started any medications since your last visit including any over-the-counter medicines, vitamins, or herbal medicines? no   Are you having any side effects from any of your medications? -  no  Have you stopped taking any of your medications? Is so, why? -  no    Have you seen any other physician or provider since your last visit? Yes - Records Obtained  Have you had any other diagnostic tests since your last visit? No  Have you been seen in the emergency room and/or had an admission to a hospital since we last saw you? No  Have you had your routine dental cleaning in the past 6 months? no    Have you activated your [employer] account? If not, what are your barriers?  Yes     Patient Care Team:  Maria Luisa Torres NP as PCP - Rex Sweeney MD as Consulting Physician (Otolaryngology)    Medical History Review  Past Medical, Family, and Social History reviewed and does contribute to the patient presenting condition    Health Maintenance   Topic Date Due    HIV screen  02/26/1996    DTaP/Tdap/Td vaccine (1 - Tdap) 02/26/2000    Pneumococcal med risk (1 of 1 - PPSV23) 02/26/2000    Flu vaccine (1) 09/01/2017    Cervical cancer screen  07/10/2018

## 2017-11-15 NOTE — TELEPHONE ENCOUNTER
From: Iman Trejo  Sent: 11/15/2017 4:24 PM EST  Subject: Medication Renewal Request    Melania Kevin Gupta would like a refill of the following medications:  ketorolac (TORADOL) 10 MG tablet Juan Mon NP]    Preferred pharmacy: [place] [address] [address]  JARAD [phone]    Comment:

## 2017-11-16 ENCOUNTER — TELEPHONE (OUTPATIENT)
Dept: FAMILY MEDICINE CLINIC | Age: 37
End: 2017-11-16

## 2017-11-16 RX ORDER — KETOROLAC TROMETHAMINE 10 MG/1
10 TABLET, FILM COATED ORAL EVERY 6 HOURS PRN
Qty: 10 TABLET | Refills: 0 | OUTPATIENT
Start: 2017-11-16 | End: 2018-11-16

## 2017-11-16 NOTE — TELEPHONE ENCOUNTER
Pt was notified and says she does not have dependable transportation to [place] so she is unable to follow their recommendations on the next step. I encouraged her to call her insurance, talk to her  and they should be able to set up transportation. Pt says she has already tried that and they were not able to take her when she had appts.  Pt will check with insurance to see who else is covered on her plan and give us a call back for a referral.

## 2017-11-17 ENCOUNTER — HOSPITAL ENCOUNTER (OUTPATIENT)
Dept: PHYSICAL THERAPY | Age: 37
Setting detail: THERAPIES SERIES
Discharge: HOME | End: 2017-11-17
Payer: MEDICARE

## 2017-11-17 PROCEDURE — 97110 THERAPEUTIC EXERCISES: CPT

## 2017-11-17 PROCEDURE — 97140 MANUAL THERAPY 1/> REGIONS: CPT

## 2017-11-17 ASSESSMENT — PAIN SCALES - GENERAL: PAINLEVEL_OUTOF10: 10

## 2017-11-17 ASSESSMENT — PAIN DESCRIPTION - LOCATION: LOCATION: NECK

## 2017-11-17 ASSESSMENT — PAIN DESCRIPTION - PAIN TYPE: TYPE: CHRONIC PAIN

## 2017-11-17 ASSESSMENT — PAIN DESCRIPTION - ORIENTATION: ORIENTATION: RIGHT

## 2017-11-17 NOTE — PROGRESS NOTES
[address]     Time In: 1137  Time Out: 6016  Minutes: 38  Timed Code Treatment Minutes: 38 Minutes     Date: 2017  Patient Name: Spencer Granados,  Gender:  female        CSN: [id number]   : 1980  (40 y.o.)       Referring Practitioner: Wale Murphy DO      Diagnosis: cervicogenic HA, bilateral occipital neuralgia  Treatment Diagnosis: cervicogenic HA, bilateral occipital neuralgia   Additional Pertinent Hx: history of COPD, asthma, depression, migraines. has had complete hysterectomy                  General:  PT Visit Information  PT Insurance Information: [employer]. 30 visits/calendar year, ionto/HP/CP not covered  Total # of Visits Approved: 30  Total # of Visits to Date: 5  Plan of Care/Certification Expiration Date: 17  No Show: 2  Canceled Appointment: 1  Progress Note Counter: 5/10 for progress note               Subjective:  Chart Reviewed: Yes  Patient assessed for rehabilitation services?: Yes  Family / Caregiver Present: No  Comments: next physician visit PRN. symptoms no increase with any specific activity/incident. light and noise with severe HA,   Other (Comment): script: neck PT for cervicogenic Ha and bilateral occipital neuralgia     Subjective: Patient reports waiting to hear back about times to start her new job. States today pain and tightness is all on the right side. Reports left side of head is tender to touch but no pain. States doing well with HEP. Reports still getting migraines and was woke up by one this morning. States is still looking for a place to get Botox injections. Reports does not feel therapy is helping and only gives short term relief. Reports would like to be done with therapy and will try to find a place for Botox.      Pain:  Patient Currently in Pain: Yes  Pain Assessment: 0-10  Pain Level: 10  Pain Type: Chronic pain  Pain Location: Neck  Pain Orientation: Right  Pain Radiating Towards: 3/10 left side neck/head. Reports hand still shaking and having trouble gripping things with right hand. States migraine is 7/10. Objective    Exercises  Exercise 1: Discussed HEP and educated on what to continue with. Exercise 2: Supine myofscial work to right side cervical to base of neck. Patient started to have right hand go numb so had sit up. Started trigger point work around scapula on right but all was not changing pain and had was still numb so stopped. Treatment was meant to help decrease tightness on right side to help decrease pain. Activity Tolerance:  Activity Tolerance: Patient limited by pain    Assessment:  Assessment: Patient is not benefitting from therapy at th is time and is not meeting goals. Patient has had modd changes and increased tightness/pain on right side neck with decreased use of right arm since had Botox injections. Patient is scheduled to get more Botox injections. Unsure what to do with patient at this time due to continuing symptoms and no change. No more therapy warranted. Patient Education:  Patient Education: Continue with HEP as pain free as possible. Find out why Botox injections affected as they did. Plan:  Plan Comment: Discharge to [facility] and follow up with doctor. Goals:  Patient goals : less pain    Short term goals  Time Frame for Short term goals: 4 weeks  Short term goal 1: decrease reports of pain average <4/10, HA <3/10 average  tolerate all household chores and work on computer - NOT MET Patient reports pain averaging 8/10 for for neck and headache pain.   Short term goal 2: improve posturing to neutral with sitting and standing with <3VC/visit  - MET    Long term goals  Time Frame for Long term goals : 6 weeks  Long term goal 1: decrease average reports of neck pain <3/10, HA to be intermittent and less than 3X/week to tolerate all household chores and work on computer - NOT MET

## 2017-11-27 RX ORDER — KETOROLAC TROMETHAMINE 10 MG/1
10 TABLET, FILM COATED ORAL EVERY 6 HOURS PRN
Qty: 10 TABLET | Refills: 0 | Status: SHIPPED | OUTPATIENT
Start: 2017-11-27 | End: 2018-01-18 | Stop reason: SDUPTHER

## 2018-01-18 RX ORDER — GABAPENTIN 800 MG/1
800 TABLET ORAL 4 TIMES DAILY
Qty: 120 TABLET | Refills: 2 | Status: SHIPPED | OUTPATIENT
Start: 2018-01-18 | End: 2018-02-20 | Stop reason: SDUPTHER

## 2018-01-18 RX ORDER — KETOROLAC TROMETHAMINE 10 MG/1
10 TABLET, FILM COATED ORAL EVERY 6 HOURS PRN
Qty: 20 TABLET | Refills: 0 | Status: SHIPPED | OUTPATIENT
Start: 2018-01-18 | End: 2018-10-08 | Stop reason: SDUPTHER

## 2018-01-18 NOTE — TELEPHONE ENCOUNTER
From: Monroe Jackson  Sent: 1/18/2018 5:51 AM EST  Subject: Medication Renewal Request    Mark Lynne.  Bibi Warren would like a refill of the following medications:  gabapentin (NEURONTIN) 800 MG tablet [Chadd Maddox NP]  ketorolac (TORADOL) 10 MG tablet Beni Guadarrama NP]    Preferred pharmacy: [place] [address] [address] [facility] [phone]    Comment:

## 2018-02-01 ENCOUNTER — TELEPHONE (OUTPATIENT)
Dept: ENT CLINIC | Age: 38
End: 2018-02-01

## 2018-02-01 DIAGNOSIS — N32.81 OAB (OVERACTIVE BLADDER): ICD-10-CM

## 2018-02-01 DIAGNOSIS — M79.672 LEFT FOOT PAIN: ICD-10-CM

## 2018-02-01 RX ORDER — OXYBUTYNIN CHLORIDE 10 MG/1
10 TABLET, EXTENDED RELEASE ORAL DAILY
Qty: 30 TABLET | Refills: 11 | Status: SHIPPED | OUTPATIENT
Start: 2018-02-01 | End: 2018-06-26 | Stop reason: SDUPTHER

## 2018-02-01 RX ORDER — SODIUM CHLORIDE/ALOE VERA
GEL (GRAM) NASAL PRN
Qty: 1 TUBE | Refills: 3 | Status: SHIPPED | OUTPATIENT
Start: 2018-02-01 | End: 2019-02-20 | Stop reason: ALTCHOICE

## 2018-02-01 RX ORDER — NAPROXEN 500 MG/1
500 TABLET ORAL 2 TIMES DAILY WITH MEALS
Qty: 30 TABLET | Refills: 0 | Status: SHIPPED | OUTPATIENT
Start: 2018-02-01 | End: 2018-02-14 | Stop reason: ALTCHOICE

## 2018-02-01 NOTE — TELEPHONE ENCOUNTER
Hernan Breaux would like a refill of the following medications:   saline nasal gel (AYR) GEL MARU Daly]     Preferred pharmacy: [facility] [address] [place] [address] -  [phone]     Comment:       Medication renewals requested in this message routed separately:   oxybutynin (DITROPAN-XL) 10 MG extended release tablet [Chadd Maddox, NP]   naproxen (NAPROSYN) 500 MG tablet [Chadd Maddox, NP]

## 2018-02-14 ENCOUNTER — OFFICE VISIT (OUTPATIENT)
Dept: FAMILY MEDICINE CLINIC | Age: 38
End: 2018-02-14
Payer: MEDICARE

## 2018-02-14 VITALS
BODY MASS INDEX: 31.28 KG/M2 | WEIGHT: 159.3 LBS | RESPIRATION RATE: 20 BRPM | HEART RATE: 72 BPM | DIASTOLIC BLOOD PRESSURE: 68 MMHG | SYSTOLIC BLOOD PRESSURE: 106 MMHG | HEIGHT: 60 IN

## 2018-02-14 DIAGNOSIS — F17.200 SMOKING ADDICTION: ICD-10-CM

## 2018-02-14 DIAGNOSIS — F41.1 GAD (GENERALIZED ANXIETY DISORDER): ICD-10-CM

## 2018-02-14 DIAGNOSIS — Z13.220 SCREENING FOR HYPERLIPIDEMIA: ICD-10-CM

## 2018-02-14 DIAGNOSIS — F31.60 BIPOLAR 1 DISORDER, MIXED (HCC): Primary | ICD-10-CM

## 2018-02-14 DIAGNOSIS — J42 CHRONIC BRONCHITIS, UNSPECIFIED CHRONIC BRONCHITIS TYPE (HCC): ICD-10-CM

## 2018-02-14 PROCEDURE — G8427 DOCREV CUR MEDS BY ELIG CLIN: HCPCS | Performed by: NURSE PRACTITIONER

## 2018-02-14 PROCEDURE — G8417 CALC BMI ABV UP PARAM F/U: HCPCS | Performed by: NURSE PRACTITIONER

## 2018-02-14 PROCEDURE — 3023F SPIROM DOC REV: CPT | Performed by: NURSE PRACTITIONER

## 2018-02-14 PROCEDURE — G8484 FLU IMMUNIZE NO ADMIN: HCPCS | Performed by: NURSE PRACTITIONER

## 2018-02-14 PROCEDURE — G8926 SPIRO NO PERF OR DOC: HCPCS | Performed by: NURSE PRACTITIONER

## 2018-02-14 PROCEDURE — 99214 OFFICE O/P EST MOD 30 MIN: CPT | Performed by: NURSE PRACTITIONER

## 2018-02-14 PROCEDURE — 4004F PT TOBACCO SCREEN RCVD TLK: CPT | Performed by: NURSE PRACTITIONER

## 2018-02-14 RX ORDER — LAMOTRIGINE 150 MG/1
150 TABLET ORAL DAILY
Qty: 30 TABLET | Refills: 5 | Status: SHIPPED | OUTPATIENT
Start: 2018-02-14 | End: 2018-10-01 | Stop reason: ALTCHOICE

## 2018-02-14 RX ORDER — KETOROLAC TROMETHAMINE 10 MG/1
10 TABLET, FILM COATED ORAL EVERY 6 HOURS PRN
Qty: 20 TABLET | Refills: 0 | Status: SHIPPED | OUTPATIENT
Start: 2018-02-14 | End: 2018-04-05 | Stop reason: SDUPTHER

## 2018-02-14 ASSESSMENT — PATIENT HEALTH QUESTIONNAIRE - PHQ9
1. LITTLE INTEREST OR PLEASURE IN DOING THINGS: 0
SUM OF ALL RESPONSES TO PHQ9 QUESTIONS 1 & 2: 0
2. FEELING DOWN, DEPRESSED OR HOPELESS: 0
SUM OF ALL RESPONSES TO PHQ QUESTIONS 1-9: 0

## 2018-02-14 ASSESSMENT — ENCOUNTER SYMPTOMS
VOMITING: 0
ABDOMINAL PAIN: 0
NAUSEA: 0
COUGH: 0
SHORTNESS OF BREATH: 0
RHINORRHEA: 1

## 2018-02-14 NOTE — PROGRESS NOTES
Subjective:      Patient ID: Karena Israel is a 40 y.o. female. HPI  3 month Follow Up    Chief Complaint   Patient presents with    3 Month Follow-Up     mood       Patient Active Problem List   Diagnosis    Chronic migraine    Smoking addiction    COPD (chronic obstructive pulmonary disease) ([address]    ANTHONY (generalized anxiety disorder)    Chronic pansinusitis       Chronic Migraines. Family hx. On Gabapentin 800 mg QID. Failed on Elavil, Topamax and multiple rx abortive therapy. Zanaflex beneficial for tension aspect from stress. Did see the [facility] and did received Botox INJ. No benefit. Was going through therapy as well for neck pain related to HA and this has shown no benefit. Has appt on 2/26/18 with [facility] at [address] [place] [address]. Mood stable. Mood driven by HA pain. Anxiety and irritable better - not as snappy to her children. On Lamictal 150 mg and Wellbutrin 300 mg XL. Has been on Celexa, Prozac, Zoloft, Lexapro, Effexor, Cymbalta, Abilify, Risperdal and Buspar in past.     OAB stable on Ditropan    Denies CP, SOB or chest tightness. Stable on Symbicort. Continues to smoke 1/2 ppd      BP Readings from Last 3 Encounters:   02/14/18 106/68   11/14/17 118/64   10/05/17 124/74        BP wnl    Labs reviewed.   Due for annual    No results found for: LABA1C  No results found for: EAG    No components found for: CHLPL  Lab Results   Component Value Date    TRIG 94 07/02/2015     Lab Results   Component Value Date    HDL 39 07/02/2015     Lab Results   Component Value Date    LDLCALC 110 07/02/2015     No results found for: LABVLDL      Chemistry        Component Value Date/Time     07/02/2015 1154    K 4.4 07/02/2015 1154     07/02/2015 1154    CO2 26 07/02/2015 1154    BUN 16 07/02/2015 1154    CREATININE 0.7 07/02/2015 1154        Component Value Date/Time    CALCIUM 9.1 07/02/2015 1154    ALKPHOS 70 07/02/2015 1154    AST 12 07/02/2015 1154    ALT 10 (L) 07/02/2015 1154 BILITOT 0.3 07/02/2015 1154            No results found for: TSH, D7UXHLL, I3YGQTW, THYROIDAB    Lab Results   Component Value Date    WBC 8.2 05/26/2017    HGB 14.1 05/26/2017    HCT 43.1 05/26/2017    MCV 91.8 05/26/2017     05/26/2017         Health Maintenance   Topic Date Due    HIV screen  02/26/1996    DTaP/Tdap/Td vaccine (1 - Tdap) 02/26/2000    Pneumococcal med risk (1 of 1 - PPSV23) 02/26/2000    Cervical cancer screen  07/10/2018    Flu vaccine  Completed       Immunization History   Administered Date(s) Administered    Influenza, Quadv, 3 Years and older, IM 11/14/2017         Review of Systems   Constitutional: Negative for chills and fever. HENT: Positive for postnasal drip and rhinorrhea. Respiratory: Negative for cough and shortness of breath. Cardiovascular: Negative for chest pain. Gastrointestinal: Negative for abdominal pain, nausea and vomiting. Musculoskeletal: Negative for neck pain and neck stiffness. Skin: Negative for rash. Neurological: Positive for headaches. Negative for dizziness and light-headedness. Psychiatric/Behavioral: Positive for dysphoric mood and sleep disturbance. The patient is nervous/anxious. Objective:   Physical Exam   Constitutional: She is oriented to person, place, and time. Vital signs are normal. She appears well-developed and well-nourished. She is active. She does not have a sickly appearance. No distress. HENT:   Right Ear: Tympanic membrane normal.   Left Ear: Tympanic membrane normal.   Nose: Nose normal.   Mouth/Throat: Oropharynx is clear and moist and mucous membranes are normal.   Neck: Full passive range of motion without pain. Muscular tenderness present. No spinous process tenderness present. Neck rigidity present. Normal range of motion present. Cardiovascular: Normal rate, regular rhythm, S1 normal, S2 normal, normal heart sounds and normal pulses. Exam reveals no S3. No murmur heard.   Pulmonary/Chest: Effort normal and breath sounds normal. She has no decreased breath sounds. She has no wheezes. She has no rhonchi. Abdominal: Soft. Bowel sounds are normal. There is no tenderness. Neurological: She is alert and oriented to person, place, and time. She has normal strength. No cranial nerve deficit or sensory deficit. She displays a negative Romberg sign. Coordination and gait normal.   Psychiatric: Her mood appears anxious. She is hyperactive. She is not slowed and not withdrawn. Cognition and memory are normal. She expresses impulsivity. She does not exhibit a depressed mood. Assessment:      1. Bipolar 1 disorder, mixed (HCC)  lamoTRIgine (LAMICTAL) 150 MG tablet   2. Chronic migraine  ketorolac (TORADOL) 10 MG tablet   3. ANTHONY (generalized anxiety disorder)     4. Chronic bronchitis, unspecified chronic bronchitis type ([facility]     5. Smoking addiction     6. Screening for hyperlipidemia  Lipid Panel    Basic Metabolic Panel           Plan:       Chronic conditions stable except Migraine  Follow up with [facility]  Refills as above - mood stable  Labs as above  Healthy Lifestyle discussed  Denies smoking cessation  RTO in 3 months    Claude Newton received counseling on the following healthy behaviors: nutrition and exercise  Reviewed prior labs and health maintenance  Continue current medications, diet and exercise. Discussed use, benefit, and side effects of prescribed medications. Barriers to medication compliance addressed. Patient given educational materials - see patient instructions  Was a self-tracking handout given in paper form or [facility]? No:     Requested Prescriptions     Signed Prescriptions Disp Refills    ketorolac (TORADOL) 10 MG tablet 20 tablet 0     Sig: Take 1 tablet by mouth every 6 hours as needed for Pain    lamoTRIgine (LAMICTAL) 150 MG tablet 30 tablet 5     Sig: Take 1 tablet by mouth daily       All patient questions answered. Patient voiced understanding.     Quality

## 2018-02-20 DIAGNOSIS — G44.209 TENSION HEADACHE: ICD-10-CM

## 2018-02-20 RX ORDER — TIZANIDINE 4 MG/1
4 TABLET ORAL EVERY 8 HOURS PRN
Qty: 90 TABLET | Refills: 5 | Status: SHIPPED | OUTPATIENT
Start: 2018-02-20 | End: 2018-08-17 | Stop reason: SDUPTHER

## 2018-02-20 RX ORDER — GABAPENTIN 800 MG/1
800 TABLET ORAL 4 TIMES DAILY
Qty: 120 TABLET | Refills: 2 | Status: SHIPPED | OUTPATIENT
Start: 2018-02-20 | End: 2018-10-01 | Stop reason: ALTCHOICE

## 2018-03-12 ENCOUNTER — NURSE ONLY (OUTPATIENT)
Dept: FAMILY MEDICINE CLINIC | Age: 38
End: 2018-03-12
Payer: MEDICARE

## 2018-03-12 PROCEDURE — 96372 THER/PROPH/DIAG INJ SC/IM: CPT | Performed by: NURSE PRACTITIONER

## 2018-03-12 RX ORDER — KETOROLAC TROMETHAMINE 30 MG/ML
60 INJECTION, SOLUTION INTRAMUSCULAR; INTRAVENOUS ONCE
Status: COMPLETED | OUTPATIENT
Start: 2018-03-12 | End: 2018-03-12

## 2018-03-12 RX ADMIN — KETOROLAC TROMETHAMINE 60 MG: 30 INJECTION, SOLUTION INTRAMUSCULAR; INTRAVENOUS at 12:19

## 2018-03-12 NOTE — PROGRESS NOTES
Patient was given Toradol 60mg 2ml  IM in left gluteal per [facility]. [facility] #[id number]. Patient tolerated well and without incident.

## 2018-03-13 ENCOUNTER — PATIENT MESSAGE (OUTPATIENT)
Dept: FAMILY MEDICINE CLINIC | Age: 38
End: 2018-03-13

## 2018-03-13 NOTE — TELEPHONE ENCOUNTER
From: Lety Joseph  To: Natalie Yeung NP  Sent: 3/13/2018 1:16 PM EDT  Subject: Non-Urgent Medical Question    [facility] apparently discharged me on the 5th. Is there another doctor we can get me into? I'm sick of fighting these migraines.

## 2018-03-26 ENCOUNTER — PATIENT MESSAGE (OUTPATIENT)
Dept: FAMILY MEDICINE CLINIC | Age: 38
End: 2018-03-26

## 2018-03-26 NOTE — TELEPHONE ENCOUNTER
[facility] clinic at [phone] and left message to ret call so appt can be scheduled  They are located at [facility] [address]

## 2018-03-27 ENCOUNTER — PATIENT MESSAGE (OUTPATIENT)
Dept: FAMILY MEDICINE CLINIC | Age: 38
End: 2018-03-27

## 2018-03-27 ENCOUNTER — TELEPHONE (OUTPATIENT)
Dept: FAMILY MEDICINE CLINIC | Age: 38
End: 2018-03-27

## 2018-03-27 RX ORDER — SUMATRIPTAN 100 MG/1
TABLET, FILM COATED ORAL
Qty: 9 TABLET | Refills: 3 | Status: SHIPPED | OUTPATIENT
Start: 2018-03-27 | End: 2018-07-02 | Stop reason: SDUPTHER

## 2018-04-05 RX ORDER — KETOROLAC TROMETHAMINE 10 MG/1
10 TABLET, FILM COATED ORAL EVERY 6 HOURS PRN
Qty: 20 TABLET | Refills: 0 | Status: SHIPPED | OUTPATIENT
Start: 2018-04-05 | End: 2018-05-29 | Stop reason: SDUPTHER

## 2018-04-18 ENCOUNTER — TELEPHONE (OUTPATIENT)
Dept: FAMILY MEDICINE CLINIC | Age: 38
End: 2018-04-18

## 2018-05-10 ENCOUNTER — TELEPHONE (OUTPATIENT)
Dept: FAMILY MEDICINE CLINIC | Age: 38
End: 2018-05-10

## 2018-05-27 DIAGNOSIS — J42 CHRONIC BRONCHITIS, UNSPECIFIED CHRONIC BRONCHITIS TYPE (HCC): ICD-10-CM

## 2018-05-29 DIAGNOSIS — J42 CHRONIC BRONCHITIS, UNSPECIFIED CHRONIC BRONCHITIS TYPE (HCC): ICD-10-CM

## 2018-05-29 RX ORDER — BUDESONIDE AND FORMOTEROL FUMARATE DIHYDRATE 80; 4.5 UG/1; UG/1
2 AEROSOL RESPIRATORY (INHALATION) 2 TIMES DAILY
Qty: 1 INHALER | Refills: 11 | Status: SHIPPED | OUTPATIENT
Start: 2018-05-29 | End: 2019-06-16 | Stop reason: SDUPTHER

## 2018-05-29 RX ORDER — KETOROLAC TROMETHAMINE 10 MG/1
10 TABLET, FILM COATED ORAL EVERY 6 HOURS PRN
Qty: 20 TABLET | Refills: 0 | Status: SHIPPED | OUTPATIENT
Start: 2018-05-29 | End: 2018-07-02 | Stop reason: SDUPTHER

## 2018-06-07 ENCOUNTER — PATIENT MESSAGE (OUTPATIENT)
Dept: FAMILY MEDICINE CLINIC | Age: 38
End: 2018-06-07

## 2018-06-07 DIAGNOSIS — F41.9 ANXIETY: Primary | ICD-10-CM

## 2018-06-08 RX ORDER — HYDROXYZINE HYDROCHLORIDE 25 MG/1
25-50 TABLET, FILM COATED ORAL EVERY 8 HOURS PRN
Qty: 30 TABLET | Refills: 1 | Status: SHIPPED | OUTPATIENT
Start: 2018-06-08 | End: 2018-08-17 | Stop reason: SDUPTHER

## 2018-06-26 DIAGNOSIS — N32.81 OAB (OVERACTIVE BLADDER): ICD-10-CM

## 2018-06-26 RX ORDER — OXYBUTYNIN CHLORIDE 10 MG/1
10 TABLET, EXTENDED RELEASE ORAL DAILY
Qty: 30 TABLET | Refills: 11 | Status: SHIPPED | OUTPATIENT
Start: 2018-06-26 | End: 2018-09-04 | Stop reason: SDUPTHER

## 2018-06-26 RX ORDER — OXYBUTYNIN CHLORIDE 10 MG/1
10 TABLET, EXTENDED RELEASE ORAL DAILY
Qty: 30 TABLET | Refills: 11 | OUTPATIENT
Start: 2018-06-26

## 2018-06-26 RX ORDER — KETOROLAC TROMETHAMINE 10 MG/1
10 TABLET, FILM COATED ORAL EVERY 6 HOURS PRN
Qty: 20 TABLET | Refills: 0 | OUTPATIENT
Start: 2018-06-26 | End: 2019-06-26

## 2018-07-02 RX ORDER — KETOROLAC TROMETHAMINE 10 MG/1
10 TABLET, FILM COATED ORAL EVERY 6 HOURS PRN
Qty: 20 TABLET | Refills: 0 | Status: SHIPPED | OUTPATIENT
Start: 2018-07-02 | End: 2018-08-06 | Stop reason: SDUPTHER

## 2018-07-02 RX ORDER — SUMATRIPTAN 100 MG/1
TABLET, FILM COATED ORAL
Qty: 9 TABLET | Refills: 3 | Status: SHIPPED | OUTPATIENT
Start: 2018-07-02 | End: 2018-10-01 | Stop reason: ALTCHOICE

## 2018-07-02 NOTE — TELEPHONE ENCOUNTER
From: Zulema Leroy  Sent: 7/2/2018 10:32 AM EDT  Subject: Medication Renewal Request    Manuelajae Jones.  Nunu Garcia would like a refill of the following medications:     SUMAtriptan (IMITREX) 100 MG tablet [EVA Andrews - CNP]     ketorolac (TORADOL) 10 MG tablet [EVA Ruiz - CNP]    Preferred pharmacy: [address]    Comment:

## 2018-07-22 ENCOUNTER — PATIENT MESSAGE (OUTPATIENT)
Dept: FAMILY MEDICINE CLINIC | Age: 38
End: 2018-07-22

## 2018-07-22 DIAGNOSIS — K04.7 DENTAL ABSCESS: Primary | ICD-10-CM

## 2018-07-23 RX ORDER — AMOXICILLIN 500 MG/1
500 CAPSULE ORAL 3 TIMES DAILY
Qty: 30 CAPSULE | Refills: 0 | Status: SHIPPED | OUTPATIENT
Start: 2018-07-23 | End: 2018-08-24 | Stop reason: SDUPTHER

## 2018-07-23 RX ORDER — GABAPENTIN 800 MG/1
800 TABLET ORAL 4 TIMES DAILY
Qty: 120 TABLET | Refills: 2 | Status: CANCELLED | OUTPATIENT
Start: 2018-07-23 | End: 2018-10-21

## 2018-07-23 NOTE — TELEPHONE ENCOUNTER
From: Luciano Reyes  Sent: 7/22/2018 2:14 PM EDT  Subject: Medication Renewal Request    Estefani Mcdonald.  Orvel Castleman would like a refill of the following medications:     gabapentin (NEURONTIN) 800 MG tablet [Chadd Estrada, APRN - CNP]    Preferred pharmacy: [address]    Comment:

## 2018-07-30 ENCOUNTER — HOSPITAL ENCOUNTER (OUTPATIENT)
Age: 38
Discharge: HOME | End: 2018-07-30
Payer: MEDICARE

## 2018-07-30 DIAGNOSIS — Z13.220 SCREENING FOR HYPERLIPIDEMIA: ICD-10-CM

## 2018-07-30 LAB
ANION GAP SERPL CALCULATED.3IONS-SCNC: 11 MEQ/L (ref 8–16)
BUN BLDV-MCNC: 17 MG/DL (ref 7–22)
CALCIUM SERPL-MCNC: 9.3 MG/DL (ref 8.5–10.5)
CHLORIDE BLD-SCNC: 106 MEQ/L (ref 98–111)
CHOLESTEROL, TOTAL: 190 MG/DL (ref 100–199)
CO2: 30 MEQ/L (ref 23–33)
CREAT SERPL-MCNC: 0.6 MG/DL (ref 0.4–1.2)
GFR SERPL CREATININE-BSD FRML MDRD: > 90 ML/MIN/1.73M2
GLUCOSE BLD-MCNC: 77 MG/DL (ref 70–108)
HDLC SERPL-MCNC: 37 MG/DL
LDL CHOLESTEROL CALCULATED: 131 MG/DL
POTASSIUM SERPL-SCNC: 4.6 MEQ/L (ref 3.5–5.2)
SODIUM BLD-SCNC: 147 MEQ/L (ref 135–145)
TRIGL SERPL-MCNC: 108 MG/DL (ref 0–199)

## 2018-07-30 PROCEDURE — 80048 BASIC METABOLIC PNL TOTAL CA: CPT

## 2018-07-30 PROCEDURE — 80061 LIPID PANEL: CPT

## 2018-07-30 PROCEDURE — 36415 COLL VENOUS BLD VENIPUNCTURE: CPT

## 2018-08-06 RX ORDER — KETOROLAC TROMETHAMINE 10 MG/1
10 TABLET, FILM COATED ORAL EVERY 6 HOURS PRN
Qty: 20 TABLET | Refills: 0 | Status: SHIPPED | OUTPATIENT
Start: 2018-08-06 | End: 2018-09-04 | Stop reason: SDUPTHER

## 2018-08-06 NOTE — TELEPHONE ENCOUNTER
From: Luciano Reyes  Sent: 8/3/2018 7:21 PM EDT  Subject: Medication Renewal Request    Estefani Mcdonald.  Orvel Castleman would like a refill of the following medications:     ketorolac (TORADOL) 10 MG tablet [Chadd Estrada, APRN - CNP]    Preferred pharmacy: [address]    Comment:

## 2018-08-17 DIAGNOSIS — G44.209 TENSION HEADACHE: ICD-10-CM

## 2018-08-17 DIAGNOSIS — F41.9 ANXIETY: ICD-10-CM

## 2018-08-20 RX ORDER — HYDROXYZINE HYDROCHLORIDE 25 MG/1
TABLET, FILM COATED ORAL
Qty: 30 TABLET | Refills: 1 | Status: SHIPPED | OUTPATIENT
Start: 2018-08-20 | End: 2018-10-17 | Stop reason: SDUPTHER

## 2018-08-20 RX ORDER — TIZANIDINE 4 MG/1
TABLET ORAL
Qty: 90 TABLET | Refills: 5 | Status: SHIPPED | OUTPATIENT
Start: 2018-08-20 | End: 2019-03-18 | Stop reason: SDUPTHER

## 2018-08-24 ENCOUNTER — PATIENT MESSAGE (OUTPATIENT)
Dept: FAMILY MEDICINE CLINIC | Age: 38
End: 2018-08-24

## 2018-08-24 DIAGNOSIS — K04.7 DENTAL ABSCESS: ICD-10-CM

## 2018-08-24 RX ORDER — METRONIDAZOLE 500 MG/1
500 TABLET ORAL 3 TIMES DAILY
Qty: 30 TABLET | Refills: 0 | Status: SHIPPED | OUTPATIENT
Start: 2018-08-24 | End: 2018-09-03

## 2018-08-24 RX ORDER — AMOXICILLIN 500 MG/1
500 CAPSULE ORAL 3 TIMES DAILY
Qty: 30 CAPSULE | Refills: 0 | Status: SHIPPED | OUTPATIENT
Start: 2018-08-24 | End: 2018-09-03

## 2018-09-04 DIAGNOSIS — N32.81 OAB (OVERACTIVE BLADDER): ICD-10-CM

## 2018-09-04 RX ORDER — OXYBUTYNIN CHLORIDE 10 MG/1
10 TABLET, EXTENDED RELEASE ORAL DAILY
Qty: 30 TABLET | Refills: 11 | Status: SHIPPED | OUTPATIENT
Start: 2018-09-04 | End: 2019-09-03 | Stop reason: SDUPTHER

## 2018-09-04 RX ORDER — KETOROLAC TROMETHAMINE 10 MG/1
10 TABLET, FILM COATED ORAL EVERY 6 HOURS PRN
Qty: 20 TABLET | Refills: 0 | Status: SHIPPED | OUTPATIENT
Start: 2018-09-04 | End: 2018-10-01 | Stop reason: SDUPTHER

## 2018-09-12 ENCOUNTER — PATIENT MESSAGE (OUTPATIENT)
Dept: FAMILY MEDICINE CLINIC | Age: 38
End: 2018-09-12

## 2018-09-12 NOTE — TELEPHONE ENCOUNTER
From: Bernie Giordano  To: Lara Willson, EVA - CNP  Sent: 9/12/2018 11:31 AM EDT  Subject: Prescription Question    The Gabapentin n Tazanidine work but do not last a full month with these migraines. I run out within 3-5 days before my scripts can b filled.

## 2018-10-01 ENCOUNTER — OFFICE VISIT (OUTPATIENT)
Dept: FAMILY MEDICINE CLINIC | Age: 38
End: 2018-10-01
Payer: MEDICARE

## 2018-10-01 VITALS
BODY MASS INDEX: 37.81 KG/M2 | OXYGEN SATURATION: 98 % | SYSTOLIC BLOOD PRESSURE: 114 MMHG | RESPIRATION RATE: 13 BRPM | HEART RATE: 81 BPM | WEIGHT: 192.6 LBS | DIASTOLIC BLOOD PRESSURE: 72 MMHG | HEIGHT: 60 IN

## 2018-10-01 DIAGNOSIS — F17.200 SMOKING ADDICTION: ICD-10-CM

## 2018-10-01 DIAGNOSIS — F41.1 GAD (GENERALIZED ANXIETY DISORDER): ICD-10-CM

## 2018-10-01 PROCEDURE — 99213 OFFICE O/P EST LOW 20 MIN: CPT | Performed by: NURSE PRACTITIONER

## 2018-10-01 PROCEDURE — G8484 FLU IMMUNIZE NO ADMIN: HCPCS | Performed by: NURSE PRACTITIONER

## 2018-10-01 PROCEDURE — G8427 DOCREV CUR MEDS BY ELIG CLIN: HCPCS | Performed by: NURSE PRACTITIONER

## 2018-10-01 PROCEDURE — G8417 CALC BMI ABV UP PARAM F/U: HCPCS | Performed by: NURSE PRACTITIONER

## 2018-10-01 PROCEDURE — 4004F PT TOBACCO SCREEN RCVD TLK: CPT | Performed by: NURSE PRACTITIONER

## 2018-10-01 RX ORDER — NORTRIPTYLINE HYDROCHLORIDE 50 MG/1
50 CAPSULE ORAL NIGHTLY
Qty: 30 CAPSULE | Refills: 1 | Status: SHIPPED | OUTPATIENT
Start: 2018-10-01 | End: 2019-02-20 | Stop reason: ALTCHOICE

## 2018-10-01 ASSESSMENT — ENCOUNTER SYMPTOMS
ABDOMINAL PAIN: 0
COUGH: 0
VOMITING: 0
NAUSEA: 0
SHORTNESS OF BREATH: 0

## 2018-10-01 NOTE — PROGRESS NOTES
Visit Information    Have you changed or started any medications since your last visit including any over-the-counter medicines, vitamins, or herbal medicines? yes - see med list    Are you having any side effects from any of your medications? -  no  Have you stopped taking any of your medications? Is so, why? -  yes - see med list     Have you seen any other physician or provider since your last visit? Yes - Records Requested  Have you had any other diagnostic tests since your last visit? No  Have you been seen in the emergency room and/or had an admission to a hospital since we last saw you? No  Have you had your routine dental cleaning in the past 6 months? yes - 9/18    Have you activated your [employer] account? If not, what are your barriers?  Yes     Patient Care Team:  EVA Murphy CNP as PCP - General  EVA Murphy CNP as PCP - [facility] Attributed Provider  Lois Guerra MD as Consulting Physician (Otolaryngology)    Medical History Review  Past Medical, Family, and Social History reviewed and does contribute to the patient presenting condition    Health Maintenance   Topic Date Due    HIV screen  02/26/1996    Pneumococcal med risk (1 of 1 - PPSV23) 02/26/2000    Cervical cancer screen  07/10/2018    Flu vaccine (1) 09/01/2018    DTaP/Tdap/Td vaccine (2 - Td) 09/19/2028

## 2018-10-08 RX ORDER — KETOROLAC TROMETHAMINE 10 MG/1
10 TABLET, FILM COATED ORAL EVERY 6 HOURS PRN
Qty: 20 TABLET | Refills: 0 | Status: SHIPPED | OUTPATIENT
Start: 2018-10-08 | End: 2018-11-06 | Stop reason: SDUPTHER

## 2018-10-17 DIAGNOSIS — J42 CHRONIC BRONCHITIS, UNSPECIFIED CHRONIC BRONCHITIS TYPE (HCC): ICD-10-CM

## 2018-10-17 DIAGNOSIS — F41.1 GAD (GENERALIZED ANXIETY DISORDER): ICD-10-CM

## 2018-10-17 DIAGNOSIS — F41.9 ANXIETY: ICD-10-CM

## 2018-10-17 RX ORDER — BUPROPION HYDROCHLORIDE 300 MG/1
TABLET ORAL
Qty: 30 TABLET | Refills: 11 | Status: SHIPPED | OUTPATIENT
Start: 2018-10-17 | End: 2020-01-21

## 2018-10-17 RX ORDER — HYDROXYZINE HYDROCHLORIDE 25 MG/1
TABLET, FILM COATED ORAL
Qty: 30 TABLET | Refills: 1 | Status: SHIPPED | OUTPATIENT
Start: 2018-10-17 | End: 2018-12-03 | Stop reason: SDUPTHER

## 2018-10-18 ENCOUNTER — TELEPHONE (OUTPATIENT)
Dept: FAMILY MEDICINE CLINIC | Age: 38
End: 2018-10-18

## 2018-10-29 ENCOUNTER — PATIENT MESSAGE (OUTPATIENT)
Dept: FAMILY MEDICINE CLINIC | Age: 38
End: 2018-10-29

## 2018-10-29 DIAGNOSIS — K04.7 DENTAL INFECTION: Primary | ICD-10-CM

## 2018-10-29 RX ORDER — AMOXICILLIN 875 MG/1
875 TABLET, COATED ORAL 2 TIMES DAILY
Qty: 20 TABLET | Refills: 0 | Status: SHIPPED | OUTPATIENT
Start: 2018-10-29 | End: 2018-11-08

## 2018-11-06 RX ORDER — KETOROLAC TROMETHAMINE 10 MG/1
10 TABLET, FILM COATED ORAL EVERY 6 HOURS PRN
Qty: 20 TABLET | Refills: 0 | Status: SHIPPED | OUTPATIENT
Start: 2018-11-06 | End: 2018-12-03 | Stop reason: SDUPTHER

## 2018-11-16 ENCOUNTER — TELEPHONE (OUTPATIENT)
Dept: FAMILY MEDICINE CLINIC | Age: 38
End: 2018-11-16

## 2018-11-16 NOTE — TELEPHONE ENCOUNTER
Patient informed, states same SE when taken the medication in the past, she will awake a call back from Dr. Ananth Adair office to discuss. Patient voiced understanding.

## 2018-12-03 ENCOUNTER — PATIENT MESSAGE (OUTPATIENT)
Dept: FAMILY MEDICINE CLINIC | Age: 38
End: 2018-12-03

## 2018-12-03 DIAGNOSIS — F41.9 ANXIETY: ICD-10-CM

## 2018-12-03 RX ORDER — KETOROLAC TROMETHAMINE 10 MG/1
10 TABLET, FILM COATED ORAL EVERY 6 HOURS PRN
Qty: 20 TABLET | Refills: 0 | Status: SHIPPED | OUTPATIENT
Start: 2018-12-03 | End: 2019-01-07 | Stop reason: SDUPTHER

## 2018-12-03 RX ORDER — HYDROXYZINE 50 MG/1
TABLET, FILM COATED ORAL
Qty: 30 TABLET | Refills: 1 | Status: SHIPPED | OUTPATIENT
Start: 2018-12-03 | End: 2019-01-11 | Stop reason: SDUPTHER

## 2018-12-03 NOTE — TELEPHONE ENCOUNTER
From: Kelsey Diaz  To: Essence Smyth APRN - CNP  Sent: 12/3/2018 12:03 PM EST  Subject: Prescription Question    Any way we can up my hydroxine. It's the only one that helps me sleep.  I've got an appointment with Dr. Khoi Guerra Friday because of the sleeping pills that don't work

## 2018-12-10 ENCOUNTER — TELEPHONE (OUTPATIENT)
Dept: FAMILY MEDICINE CLINIC | Age: 38
End: 2018-12-10

## 2019-01-03 ENCOUNTER — TELEPHONE (OUTPATIENT)
Dept: FAMILY MEDICINE CLINIC | Age: 39
End: 2019-01-03

## 2019-01-07 ENCOUNTER — TELEPHONE (OUTPATIENT)
Dept: FAMILY MEDICINE CLINIC | Age: 39
End: 2019-01-07

## 2019-01-07 RX ORDER — KETOROLAC TROMETHAMINE 10 MG/1
10 TABLET, FILM COATED ORAL EVERY 6 HOURS PRN
Qty: 20 TABLET | Refills: 0 | Status: SHIPPED | OUTPATIENT
Start: 2019-01-07 | End: 2019-02-04 | Stop reason: SDUPTHER

## 2019-01-10 ENCOUNTER — TELEPHONE (OUTPATIENT)
Dept: FAMILY MEDICINE CLINIC | Age: 39
End: 2019-01-10

## 2019-01-11 DIAGNOSIS — F41.9 ANXIETY: ICD-10-CM

## 2019-01-11 RX ORDER — GABAPENTIN 800 MG/1
TABLET ORAL
Qty: 120 TABLET | Refills: 5 | Status: SHIPPED | OUTPATIENT
Start: 2019-01-11 | End: 2019-07-29 | Stop reason: SDUPTHER

## 2019-01-11 RX ORDER — HYDROXYZINE 50 MG/1
TABLET, FILM COATED ORAL
Qty: 30 TABLET | Refills: 1 | Status: SHIPPED | OUTPATIENT
Start: 2019-01-11 | End: 2019-04-08 | Stop reason: SDUPTHER

## 2019-01-25 ENCOUNTER — TELEPHONE (OUTPATIENT)
Dept: FAMILY MEDICINE CLINIC | Age: 39
End: 2019-01-25

## 2019-02-04 RX ORDER — KETOROLAC TROMETHAMINE 10 MG/1
10 TABLET, FILM COATED ORAL EVERY 6 HOURS PRN
Qty: 20 TABLET | Refills: 0 | Status: SHIPPED | OUTPATIENT
Start: 2019-02-04 | End: 2019-03-05 | Stop reason: SDUPTHER

## 2019-02-14 ENCOUNTER — PATIENT MESSAGE (OUTPATIENT)
Dept: FAMILY MEDICINE CLINIC | Age: 39
End: 2019-02-14

## 2019-02-20 ENCOUNTER — OFFICE VISIT (OUTPATIENT)
Dept: PHYSICAL MEDICINE AND REHAB | Age: 39
End: 2019-02-20
Payer: MEDICARE

## 2019-02-20 VITALS
HEIGHT: 60 IN | DIASTOLIC BLOOD PRESSURE: 88 MMHG | HEART RATE: 80 BPM | WEIGHT: 208.4 LBS | RESPIRATION RATE: 16 BRPM | SYSTOLIC BLOOD PRESSURE: 122 MMHG | BODY MASS INDEX: 40.91 KG/M2

## 2019-02-20 DIAGNOSIS — R51.9 CHRONIC NONINTRACTABLE HEADACHE, UNSPECIFIED HEADACHE TYPE: Primary | ICD-10-CM

## 2019-02-20 DIAGNOSIS — G89.29 CHRONIC NONINTRACTABLE HEADACHE, UNSPECIFIED HEADACHE TYPE: Primary | ICD-10-CM

## 2019-02-20 PROCEDURE — 99204 OFFICE O/P NEW MOD 45 MIN: CPT | Performed by: PHYSICAL MEDICINE & REHABILITATION

## 2019-02-22 ASSESSMENT — ENCOUNTER SYMPTOMS
WHEEZING: 0
GASTROINTESTINAL NEGATIVE: 1
EYES NEGATIVE: 1
SHORTNESS OF BREATH: 0

## 2019-03-05 ENCOUNTER — INITIAL CONSULT (OUTPATIENT)
Dept: NEUROLOGY | Age: 39
End: 2019-03-05
Payer: MEDICARE

## 2019-03-05 VITALS
WEIGHT: 208 LBS | BODY MASS INDEX: 40.84 KG/M2 | DIASTOLIC BLOOD PRESSURE: 72 MMHG | HEART RATE: 66 BPM | SYSTOLIC BLOOD PRESSURE: 126 MMHG | HEIGHT: 60 IN

## 2019-03-05 DIAGNOSIS — G43.119 INTRACTABLE MIGRAINE WITH AURA WITHOUT STATUS MIGRAINOSUS: Primary | ICD-10-CM

## 2019-03-05 PROCEDURE — 99204 OFFICE O/P NEW MOD 45 MIN: CPT | Performed by: PSYCHIATRY & NEUROLOGY

## 2019-03-05 PROCEDURE — 4004F PT TOBACCO SCREEN RCVD TLK: CPT | Performed by: PSYCHIATRY & NEUROLOGY

## 2019-03-05 PROCEDURE — G8417 CALC BMI ABV UP PARAM F/U: HCPCS | Performed by: PSYCHIATRY & NEUROLOGY

## 2019-03-05 PROCEDURE — G8427 DOCREV CUR MEDS BY ELIG CLIN: HCPCS | Performed by: PSYCHIATRY & NEUROLOGY

## 2019-03-05 PROCEDURE — G8484 FLU IMMUNIZE NO ADMIN: HCPCS | Performed by: PSYCHIATRY & NEUROLOGY

## 2019-03-05 RX ORDER — DIVALPROEX SODIUM 250 MG/1
250 TABLET, EXTENDED RELEASE ORAL DAILY
Qty: 30 TABLET | Refills: 1 | Status: SHIPPED | OUTPATIENT
Start: 2019-03-05 | End: 2019-03-18 | Stop reason: SDUPTHER

## 2019-03-05 RX ORDER — KETOROLAC TROMETHAMINE 10 MG/1
10 TABLET, FILM COATED ORAL EVERY 6 HOURS PRN
Qty: 20 TABLET | Refills: 0 | Status: SHIPPED | OUTPATIENT
Start: 2019-03-05 | End: 2019-04-02 | Stop reason: SDUPTHER

## 2019-03-18 DIAGNOSIS — G44.209 TENSION HEADACHE: ICD-10-CM

## 2019-03-18 DIAGNOSIS — G43.119 INTRACTABLE MIGRAINE WITH AURA WITHOUT STATUS MIGRAINOSUS: Primary | ICD-10-CM

## 2019-03-18 DIAGNOSIS — J42 CHRONIC BRONCHITIS, UNSPECIFIED CHRONIC BRONCHITIS TYPE (HCC): ICD-10-CM

## 2019-03-18 RX ORDER — DIVALPROEX SODIUM 500 MG/1
500 TABLET, EXTENDED RELEASE ORAL DAILY
Qty: 30 TABLET | Refills: 2 | Status: SHIPPED | OUTPATIENT
Start: 2019-03-18 | End: 2019-05-31 | Stop reason: SDUPTHER

## 2019-03-19 RX ORDER — ALBUTEROL SULFATE 90 UG/1
1-2 AEROSOL, METERED RESPIRATORY (INHALATION) EVERY 6 HOURS PRN
Qty: 18 G | Refills: 3 | Status: SHIPPED | OUTPATIENT
Start: 2019-03-19 | End: 2019-04-08 | Stop reason: SDUPTHER

## 2019-03-19 RX ORDER — TIZANIDINE 4 MG/1
4 TABLET ORAL EVERY 8 HOURS PRN
Qty: 90 TABLET | Refills: 5 | Status: SHIPPED | OUTPATIENT
Start: 2019-03-19 | End: 2019-05-14 | Stop reason: SDUPTHER

## 2019-04-02 ENCOUNTER — PATIENT MESSAGE (OUTPATIENT)
Dept: FAMILY MEDICINE CLINIC | Age: 39
End: 2019-04-02

## 2019-04-02 DIAGNOSIS — F31.60 BIPOLAR 1 DISORDER, MIXED (HCC): Primary | ICD-10-CM

## 2019-04-02 RX ORDER — KETOROLAC TROMETHAMINE 10 MG/1
10 TABLET, FILM COATED ORAL EVERY 6 HOURS PRN
Qty: 20 TABLET | Refills: 0 | Status: SHIPPED | OUTPATIENT
Start: 2019-04-02 | End: 2019-04-13

## 2019-04-02 RX ORDER — QUETIAPINE FUMARATE 50 MG/1
50 TABLET, FILM COATED ORAL 2 TIMES DAILY
Qty: 60 TABLET | Refills: 0 | Status: SHIPPED | OUTPATIENT
Start: 2019-04-02 | End: 2019-05-09 | Stop reason: ALTCHOICE

## 2019-04-13 ENCOUNTER — HOSPITAL ENCOUNTER (EMERGENCY)
Age: 39
Discharge: HOME | End: 2019-04-13
Payer: MEDICARE

## 2019-04-13 VITALS
HEIGHT: 60 IN | SYSTOLIC BLOOD PRESSURE: 116 MMHG | HEART RATE: 76 BPM | DIASTOLIC BLOOD PRESSURE: 70 MMHG | WEIGHT: 204 LBS | RESPIRATION RATE: 16 BRPM | OXYGEN SATURATION: 97 % | BODY MASS INDEX: 40.05 KG/M2 | TEMPERATURE: 98.3 F

## 2019-04-13 DIAGNOSIS — G43.011 INTRACTABLE MIGRAINE WITHOUT AURA AND WITH STATUS MIGRAINOSUS: Primary | ICD-10-CM

## 2019-04-13 PROCEDURE — 96375 TX/PRO/DX INJ NEW DRUG ADDON: CPT

## 2019-04-13 PROCEDURE — 2580000003 HC RX 258: Performed by: NURSE PRACTITIONER

## 2019-04-13 PROCEDURE — 6360000002 HC RX W HCPCS: Performed by: NURSE PRACTITIONER

## 2019-04-13 PROCEDURE — 96374 THER/PROPH/DIAG INJ IV PUSH: CPT

## 2019-04-13 PROCEDURE — 2709999900 HC NON-CHARGEABLE SUPPLY

## 2019-04-13 PROCEDURE — 96361 HYDRATE IV INFUSION ADD-ON: CPT

## 2019-04-13 PROCEDURE — 99213 OFFICE O/P EST LOW 20 MIN: CPT

## 2019-04-13 PROCEDURE — 99214 OFFICE O/P EST MOD 30 MIN: CPT | Performed by: NURSE PRACTITIONER

## 2019-04-13 RX ORDER — KETOROLAC TROMETHAMINE 10 MG/1
10 TABLET, FILM COATED ORAL EVERY 8 HOURS PRN
Qty: 15 TABLET | Refills: 0 | Status: SHIPPED | OUTPATIENT
Start: 2019-04-13 | End: 2019-05-13 | Stop reason: SDUPTHER

## 2019-04-13 RX ORDER — ONDANSETRON 2 MG/ML
4 INJECTION INTRAMUSCULAR; INTRAVENOUS ONCE
Status: COMPLETED | OUTPATIENT
Start: 2019-04-13 | End: 2019-04-13

## 2019-04-13 RX ORDER — DIPHENHYDRAMINE HYDROCHLORIDE 50 MG/ML
25 INJECTION INTRAMUSCULAR; INTRAVENOUS ONCE
Status: COMPLETED | OUTPATIENT
Start: 2019-04-13 | End: 2019-04-13

## 2019-04-13 RX ORDER — 0.9 % SODIUM CHLORIDE 0.9 %
1000 INTRAVENOUS SOLUTION INTRAVENOUS ONCE
Status: COMPLETED | OUTPATIENT
Start: 2019-04-13 | End: 2019-04-13

## 2019-04-13 RX ORDER — KETOROLAC TROMETHAMINE 30 MG/ML
30 INJECTION, SOLUTION INTRAMUSCULAR; INTRAVENOUS ONCE
Status: COMPLETED | OUTPATIENT
Start: 2019-04-13 | End: 2019-04-13

## 2019-04-13 RX ADMIN — ONDANSETRON 4 MG: 2 INJECTION INTRAMUSCULAR; INTRAVENOUS at 18:17

## 2019-04-13 RX ADMIN — KETOROLAC TROMETHAMINE 30 MG: 30 INJECTION, SOLUTION INTRAMUSCULAR at 18:18

## 2019-04-13 RX ADMIN — SODIUM CHLORIDE 1000 ML: 9 INJECTION, SOLUTION INTRAVENOUS at 18:17

## 2019-04-13 RX ADMIN — DIPHENHYDRAMINE HYDROCHLORIDE 25 MG: 50 INJECTION INTRAMUSCULAR; INTRAVENOUS at 18:18

## 2019-04-13 ASSESSMENT — PAIN SCALES - GENERAL
PAINLEVEL_OUTOF10: 10
PAINLEVEL_OUTOF10: 7
PAINLEVEL_OUTOF10: 10

## 2019-04-13 ASSESSMENT — ENCOUNTER SYMPTOMS
VOMITING: 1
DIARRHEA: 1
COUGH: 0
WHEEZING: 0
SORE THROAT: 0
NAUSEA: 1
CONSTIPATION: 0
SHORTNESS OF BREATH: 0
ABDOMINAL PAIN: 0

## 2019-04-13 ASSESSMENT — PAIN DESCRIPTION - LOCATION
LOCATION: HEAD
LOCATION: HEAD

## 2019-04-13 ASSESSMENT — PAIN DESCRIPTION - ORIENTATION
ORIENTATION: RIGHT
ORIENTATION: RIGHT

## 2019-04-13 NOTE — ED PROVIDER NOTES
obstructive pulmonary disease) ([facility] [address]     Depression     Migraines        SURGICAL HISTORY     Patient  has a past surgical history that includes Tonsillectomy and adenoidectomy (1989); Ovary removal (2000); Cholecystectomy (2003); Tubal ligation (2005); Hysterectomy (2011); Dilation and curettage of uterus (2009); IGS Endo Sinus Sx (07/12/2017); and Tooth Extraction. CURRENT MEDICATIONS       Previous Medications    ALBUTEROL SULFATE HFA (VENTOLIN HFA) 108 (90 BASE) MCG/ACT INHALER    Inhale 1-2 puffs into the lungs every 6 hours as needed for Wheezing    BUDESONIDE-FORMOTEROL (SYMBICORT) 80-4.5 MCG/ACT AERO    Inhale 2 puffs into the lungs 2 times daily Rinse mouth after use. BUPROPION (WELLBUTRIN XL) 300 MG EXTENDED RELEASE TABLET    TAKE ONE TABLET BY MOUTH ONCE DAILY IN THE MORNING    DIPHENHYDRAMINE (BENADRYL) 25 MG TABLET    Take 25 mg by mouth every 6 hours as needed for Itching    DIVALPROEX (DEPAKOTE ER) 500 MG EXTENDED RELEASE TABLET    Take 1 tablet by mouth daily    GABAPENTIN (NEURONTIN) 800 MG TABLET    TAKE 1 TABLET BY MOUTH 4 TIMES DAILY    HYDROXYZINE (ATARAX) 50 MG TABLET    Take 1-2 tablets by mouth every 6 hours as needed for Itching    MULTIPLE VITAMINS-CALCIUM (ONE-A-DAY WOMENS PO)    Take by mouth daily     OXYBUTYNIN (DITROPAN-XL) 10 MG EXTENDED RELEASE TABLET    Take 1 tablet by mouth daily    PROBIOTIC PRODUCT (PROBIOTIC PO)    Take by mouth daily    QUETIAPINE (SEROQUEL) 50 MG TABLET    Take 1 tablet by mouth 2 times daily    TIZANIDINE (ZANAFLEX) 4 MG TABLET    Take 1 tablet by mouth every 8 hours as needed (neck pain)       ALLERGIES     Patient is is allergic to botox [onabotulinumtoxina (cosmetic)]; other; topamax [topiramate]; acetaminophen; aspirin; and ibuprofen.     FAMILY HISTORY     Patient'sfamily history includes Cancer in her father; Depression in her father and mother; Heart Disease in her father, maternal grandfather, and paternal grandfather; Migraines in her father normal.   Nursing note and vitals reviewed. DIAGNOSTIC RESULTS   Labs: No results found for this visit on 04/13/19. IMAGING:    URGENT CARE COURSE:     Vitals:    04/13/19 1759 04/13/19 1914   BP: (!) 131/93 116/70   Pulse: 85 76   Resp: 16 16   Temp: 98.3 °F (36.8 °C)    SpO2: 96% 97%   Weight: 204 lb (92.5 kg)    Height: 5' (1.524 m)        Patient is moderately distressed, is warm dry skin easy respirations with clear lungs. She is afebrile with normal vital signs. She smells heavily of cigarette smoke. She is obviously very photophobic and is squinting a lot. Pupils are equal and reactive to light and she has no neurological deficits on exam.  Patient's medical history review is consistent with her claim and history given. Medications   0.9 % sodium chloride bolus (1,000 mLs Intravenous New Bag 4/13/19 1817)   ketorolac (TORADOL) injection 30 mg (30 mg Intravenous Given 4/13/19 1818)   diphenhydrAMINE (BENADRYL) injection 25 mg (25 mg Intravenous Given 4/13/19 1818)   ondansetron (ZOFRAN) injection 4 mg (4 mg Intravenous Given 4/13/19 1817)     PROCEDURES:  None  FINAL IMPRESSION      1. Intractable migraine without aura and with status migrainosus        DISPOSITION/PLAN   DISPOSITION      Patient is treated in the urgent care with an IV bolus, Zofran, Toradol and Benadryl intravenously. A family member comes to pick the patient up so she can go home to sleep. Condition improves somewhat with mild pain relief. She is able to better tolerate light on discharge. Note- prior to instituting therapy, patient stated that her family was coming to pick her up. She then left without a . The nurse reports patient also reassured the same information during her care here.      PATIENT REFERRED TO:  [facility] EMERGENCY DEPT  [address]  [address]  [phone]    If symptoms worsen    DISCHARGE MEDICATIONS:  New Prescriptions    KETOROLAC (TORADOL) 10 MG TABLET    Take 1 tablet by

## 2019-04-13 NOTE — ED NOTES
Pt resting on cot with siderails up lights dimmed. Eyes closed  Respirations are easy and unlabored.   No vomitng  No diarrhea     Sole Michel RN  04/13/19 5856

## 2019-04-13 NOTE — ED NOTES
Pt instructed to call family/friend for transport home.  Verbalized understanding     Kecia Nayak RN  04/13/19 0446

## 2019-04-15 ENCOUNTER — PATIENT MESSAGE (OUTPATIENT)
Dept: FAMILY MEDICINE CLINIC | Age: 39
End: 2019-04-15

## 2019-04-18 ENCOUNTER — HOSPITAL ENCOUNTER (EMERGENCY)
Age: 39
Discharge: HOME | End: 2019-04-19
Attending: EMERGENCY MEDICINE
Payer: MEDICARE

## 2019-04-18 DIAGNOSIS — R51.9 RECURRENT HEADACHE: Primary | ICD-10-CM

## 2019-04-18 PROCEDURE — 99284 EMERGENCY DEPT VISIT MOD MDM: CPT

## 2019-04-18 ASSESSMENT — PAIN SCALES - GENERAL: PAINLEVEL_OUTOF10: 10

## 2019-04-18 ASSESSMENT — PAIN DESCRIPTION - PAIN TYPE: TYPE: ACUTE PAIN

## 2019-04-18 ASSESSMENT — PAIN DESCRIPTION - LOCATION: LOCATION: HEAD

## 2019-04-19 ENCOUNTER — TELEPHONE (OUTPATIENT)
Dept: FAMILY MEDICINE CLINIC | Age: 39
End: 2019-04-19

## 2019-04-19 VITALS
DIASTOLIC BLOOD PRESSURE: 87 MMHG | HEART RATE: 89 BPM | TEMPERATURE: 98.5 F | WEIGHT: 204 LBS | BODY MASS INDEX: 40.05 KG/M2 | RESPIRATION RATE: 16 BRPM | OXYGEN SATURATION: 100 % | HEIGHT: 60 IN | SYSTOLIC BLOOD PRESSURE: 136 MMHG

## 2019-04-19 LAB
ALBUMIN SERPL-MCNC: 4.4 G/DL (ref 3.5–5.1)
ALP BLD-CCNC: 88 U/L (ref 38–126)
ALT SERPL-CCNC: 15 U/L (ref 11–66)
ANION GAP SERPL CALCULATED.3IONS-SCNC: 12 MEQ/L (ref 8–16)
AST SERPL-CCNC: 16 U/L (ref 5–40)
BASOPHILS # BLD: 0.4 %
BASOPHILS ABSOLUTE: 0 THOU/MM3 (ref 0–0.1)
BILIRUB SERPL-MCNC: < 0.2 MG/DL (ref 0.3–1.2)
BILIRUBIN URINE: NEGATIVE
BLOOD, URINE: NEGATIVE
BUN BLDV-MCNC: 16 MG/DL (ref 7–22)
CALCIUM SERPL-MCNC: 9.6 MG/DL (ref 8.5–10.5)
CHARACTER, URINE: CLEAR
CHLORIDE BLD-SCNC: 105 MEQ/L (ref 98–111)
CO2: 24 MEQ/L (ref 23–33)
COLOR: YELLOW
CREAT SERPL-MCNC: 0.6 MG/DL (ref 0.4–1.2)
EOSINOPHIL # BLD: 3.7 %
EOSINOPHILS ABSOLUTE: 0.5 THOU/MM3 (ref 0–0.4)
ERYTHROCYTE [DISTWIDTH] IN BLOOD BY AUTOMATED COUNT: 15.3 % (ref 11.5–14.5)
ERYTHROCYTE [DISTWIDTH] IN BLOOD BY AUTOMATED COUNT: 51 FL (ref 35–45)
GFR SERPL CREATININE-BSD FRML MDRD: > 90 ML/MIN/1.73M2
GLUCOSE BLD-MCNC: 103 MG/DL (ref 70–108)
GLUCOSE URINE: NEGATIVE MG/DL
HCT VFR BLD CALC: 44.3 % (ref 37–47)
HEMOGLOBIN: 14.3 GM/DL (ref 12–16)
IMMATURE GRANS (ABS): 0.03 THOU/MM3 (ref 0–0.07)
IMMATURE GRANULOCYTES: 0.2 %
KETONES, URINE: NEGATIVE
LEUKOCYTE ESTERASE, URINE: NEGATIVE
LYMPHOCYTES # BLD: 21.5 %
LYMPHOCYTES ABSOLUTE: 2.6 THOU/MM3 (ref 1–4.8)
MCH RBC QN AUTO: 29.1 PG (ref 26–33)
MCHC RBC AUTO-ENTMCNC: 32.3 GM/DL (ref 32.2–35.5)
MCV RBC AUTO: 90.2 FL (ref 81–99)
MONOCYTES # BLD: 8.5 %
MONOCYTES ABSOLUTE: 1 THOU/MM3 (ref 0.4–1.3)
NITRITE, URINE: NEGATIVE
NUCLEATED RED BLOOD CELLS: 0 /100 WBC
OSMOLALITY CALCULATION: 282.7 MOSMOL/KG (ref 275–300)
PH UA: 7.5 (ref 5–9)
PLATELET # BLD: 209 THOU/MM3 (ref 130–400)
PMV BLD AUTO: 12.9 FL (ref 9.4–12.4)
POTASSIUM REFLEX MAGNESIUM: 4.4 MEQ/L (ref 3.5–5.2)
PROTEIN UA: NEGATIVE
RBC # BLD: 4.91 MILL/MM3 (ref 4.2–5.4)
SEG NEUTROPHILS: 65.7 %
SEGMENTED NEUTROPHILS ABSOLUTE COUNT: 8.1 THOU/MM3 (ref 1.8–7.7)
SODIUM BLD-SCNC: 141 MEQ/L (ref 135–145)
SPECIFIC GRAVITY, URINE: 1.01 (ref 1–1.03)
TOTAL PROTEIN: 7.7 G/DL (ref 6.1–8)
UROBILINOGEN, URINE: 0.2 EU/DL (ref 0–1)
WBC # BLD: 12.3 THOU/MM3 (ref 4.8–10.8)

## 2019-04-19 PROCEDURE — 85025 COMPLETE CBC W/AUTO DIFF WBC: CPT

## 2019-04-19 PROCEDURE — 80053 COMPREHEN METABOLIC PANEL: CPT

## 2019-04-19 PROCEDURE — 96374 THER/PROPH/DIAG INJ IV PUSH: CPT

## 2019-04-19 PROCEDURE — 2580000003 HC RX 258: Performed by: EMERGENCY MEDICINE

## 2019-04-19 PROCEDURE — 6360000002 HC RX W HCPCS: Performed by: EMERGENCY MEDICINE

## 2019-04-19 PROCEDURE — 36415 COLL VENOUS BLD VENIPUNCTURE: CPT

## 2019-04-19 PROCEDURE — 81003 URINALYSIS AUTO W/O SCOPE: CPT

## 2019-04-19 PROCEDURE — 96375 TX/PRO/DX INJ NEW DRUG ADDON: CPT

## 2019-04-19 RX ORDER — 0.9 % SODIUM CHLORIDE 0.9 %
1000 INTRAVENOUS SOLUTION INTRAVENOUS ONCE
Status: COMPLETED | OUTPATIENT
Start: 2019-04-19 | End: 2019-04-19

## 2019-04-19 RX ORDER — DIPHENHYDRAMINE HYDROCHLORIDE 50 MG/ML
25 INJECTION INTRAMUSCULAR; INTRAVENOUS ONCE
Status: COMPLETED | OUTPATIENT
Start: 2019-04-19 | End: 2019-04-19

## 2019-04-19 RX ORDER — PROCHLORPERAZINE EDISYLATE 5 MG/ML
10 INJECTION INTRAMUSCULAR; INTRAVENOUS ONCE
Status: COMPLETED | OUTPATIENT
Start: 2019-04-19 | End: 2019-04-19

## 2019-04-19 RX ORDER — KETOROLAC TROMETHAMINE 30 MG/ML
30 INJECTION, SOLUTION INTRAMUSCULAR; INTRAVENOUS ONCE
Status: COMPLETED | OUTPATIENT
Start: 2019-04-19 | End: 2019-04-19

## 2019-04-19 RX ADMIN — PROCHLORPERAZINE EDISYLATE 10 MG: 5 INJECTION INTRAMUSCULAR; INTRAVENOUS at 00:41

## 2019-04-19 RX ADMIN — KETOROLAC TROMETHAMINE 30 MG: 30 INJECTION, SOLUTION INTRAMUSCULAR at 00:41

## 2019-04-19 RX ADMIN — DIPHENHYDRAMINE HYDROCHLORIDE 25 MG: 50 INJECTION, SOLUTION INTRAMUSCULAR; INTRAVENOUS at 00:41

## 2019-04-19 RX ADMIN — SODIUM CHLORIDE 1000 ML: 9 INJECTION, SOLUTION INTRAVENOUS at 00:45

## 2019-04-19 ASSESSMENT — PAIN SCALES - GENERAL
PAINLEVEL_OUTOF10: 10
PAINLEVEL_OUTOF10: 10

## 2019-04-19 ASSESSMENT — ENCOUNTER SYMPTOMS
PHOTOPHOBIA: 1
VOMITING: 1
ABDOMINAL PAIN: 0
BACK PAIN: 0
SORE THROAT: 0
DIARRHEA: 0
SHORTNESS OF BREATH: 1
BLOOD IN STOOL: 0
COUGH: 0
WHEEZING: 0
NAUSEA: 1

## 2019-04-19 ASSESSMENT — PAIN DESCRIPTION - FREQUENCY: FREQUENCY: CONTINUOUS

## 2019-04-19 ASSESSMENT — PAIN DESCRIPTION - PAIN TYPE: TYPE: ACUTE PAIN

## 2019-04-19 ASSESSMENT — PAIN DESCRIPTION - ONSET: ONSET: ON-GOING

## 2019-04-19 ASSESSMENT — PAIN DESCRIPTION - PROGRESSION: CLINICAL_PROGRESSION: NOT CHANGED

## 2019-04-19 ASSESSMENT — PAIN DESCRIPTION - LOCATION: LOCATION: HEAD

## 2019-04-19 ASSESSMENT — PAIN DESCRIPTION - DESCRIPTORS: DESCRIPTORS: HEADACHE

## 2019-04-19 NOTE — ED PROVIDER NOTES
Cardiovascular: Positive for chest pain. Negative for palpitations and leg swelling. Gastrointestinal: Positive for nausea and vomiting. Negative for abdominal pain, blood in stool and diarrhea. Genitourinary: Negative for dysuria and hematuria. Musculoskeletal: Negative for arthralgias and back pain. Skin: Negative for rash. Allergic/Immunologic: Negative for environmental allergies. Neurological: Positive for headaches. Negative for dizziness, syncope, weakness and numbness. Hematological: Does not bruise/bleed easily. Psychiatric/Behavioral: Negative for dysphoric mood. The patient is not nervous/anxious. All other systems reviewed and are negative. PAST MEDICAL HISTORY    has a past medical history of Anxiety, Asthma, COPD (chronic obstructive pulmonary disease) ([facility] [address] Depression, and Migraines. SURGICAL HISTORY      has a past surgical history that includes Tonsillectomy and adenoidectomy (1989); Ovary removal (2000); Cholecystectomy (2003); Tubal ligation (2005); Hysterectomy (2011); Dilation and curettage of uterus (2009); IGS Endo Sinus Sx (07/12/2017); and Tooth Extraction.     CURRENT MEDICATIONS       Discharge Medication List as of 4/19/2019  1:43 AM      CONTINUE these medications which have NOT CHANGED    Details   ketorolac (TORADOL) 10 MG tablet Take 1 tablet by mouth every 8 hours as needed for Pain, Disp-15 tablet, R-0Print      hydrOXYzine (ATARAX) 50 MG tablet Take 1-2 tablets by mouth every 6 hours as needed for Itching, Disp-60 tablet, R-1Please consider 90 day supplies to promote better adherenceNormal      albuterol sulfate HFA (VENTOLIN HFA) 108 (90 Base) MCG/ACT inhaler Inhale 1-2 puffs into the lungs every 6 hours as needed for Wheezing, Disp-18 g, R-3Please consider 90 day supplies to promote better adherenceNormal      QUEtiapine (SEROQUEL) 50 MG tablet Take 1 tablet by mouth 2 times daily, Disp-60 tablet, R-0Normal      tiZANidine (ZANAFLEX) 4 MG tablet Take 1 tablet by mouth every 8 hours as needed (neck pain), Disp-90 tablet, R-5Please consider 90 day supplies to promote better adherenceNormal      divalproex (DEPAKOTE ER) 500 MG extended release tablet Take 1 tablet by mouth daily, Disp-30 tablet, R-2Normal      gabapentin (NEURONTIN) 800 MG tablet TAKE 1 TABLET BY MOUTH 4 TIMES DAILY, Disp-120 tablet, R-5Normal      buPROPion (WELLBUTRIN XL) 300 MG extended release tablet TAKE ONE TABLET BY MOUTH ONCE DAILY IN THE MORNING, Disp-30 tablet, R-11Please consider 90 day supplies to promote better adherenceNormal      oxybutynin (DITROPAN-XL) 10 MG extended release tablet Take 1 tablet by mouth daily, Disp-30 tablet, R-11Normal      budesonide-formoterol (SYMBICORT) 80-4.5 MCG/ACT AERO Inhale 2 puffs into the lungs 2 times daily Rinse mouth after use., Disp-1 Inhaler, R-11Normal      Probiotic Product (PROBIOTIC PO) Take by mouth daily      Multiple Vitamins-Calcium (ONE-A-DAY WOMENS PO) Take by mouth daily Historical Med      diphenhydrAMINE (BENADRYL) 25 MG tablet Take 25 mg by mouth every 6 hours as needed for Itching             ALLERGIES     is allergic to botox [onabotulinumtoxina (cosmetic)]; other; topamax [topiramate]; acetaminophen; aspirin; and ibuprofen. FAMILYHISTORY     indicated that her mother is alive. She indicated that her father is alive. She indicated that the status of her maternal grandfather is unknown. She indicated that the status of her paternal grandfather is unknown.   family history includes Cancer in her father; Depression in her father and mother; Heart Disease in her father, maternal grandfather, and paternal grandfather; Migraines in her father and mother. SOCIAL HISTORY      reports that she has been smoking cigarettes. She has been smoking about 0.50 packs per day. She has never used smokeless tobacco. She reports that she does not drink alcohol or use drugs.     PHYSICAL EXAM       ED Triage Vitals [04/18/19 2347]   BP Temp Temp Source Pulse Resp SpO2 Height Weight   (!) 139/92 98.5 °F (36.9 °C) Oral 94 16 99 % 5' (1.524 m) 204 lb (92.5 kg)      Physical Exam   Constitutional: She is oriented to person, place, and time. Vital signs are normal. She appears well-developed and well-nourished. She is cooperative. Non-toxic appearance. She does not appear ill. HENT:   Head: Normocephalic. Right Ear: External ear normal. No drainage. Left Ear: External ear normal. No drainage. Nose: Nose normal. No epistaxis. Mouth/Throat: Mucous membranes are not dry and not cyanotic. Eyes: Conjunctivae and EOM are normal. Right eye exhibits no discharge. Left eye exhibits no discharge. No scleral icterus. Neck: Trachea normal, normal range of motion and phonation normal. Neck supple. No tracheal deviation present. Cardiovascular: Normal rate, regular rhythm, normal heart sounds and intact distal pulses. Exam reveals no gallop and no friction rub. No murmur heard. Pulses:       Radial pulses are 2+ on the right side. Pulmonary/Chest: Effort normal and breath sounds normal. No stridor. No respiratory distress. She has no wheezes. She has no rales. She exhibits no tenderness. Abdominal: Soft. Bowel sounds are normal. She exhibits no distension and no pulsatile midline mass. There is no tenderness. There is no rebound and no guarding. Musculoskeletal: Normal range of motion. She exhibits no edema or tenderness (No calf pain or tenderness. No evidence of DVT). Neurological: She is alert and oriented to person, place, and time. She has normal strength. She displays no tremor. She displays no seizure activity. Coordination normal. GCS eye subscore is 4. GCS verbal subscore is 5. GCS motor subscore is 6. Skin: Skin is warm and dry. No rash (on exposed surfaced) noted. She is not diaphoretic. No cyanosis or erythema. No pallor. Psychiatric: She has a normal mood and affect.  Her speech is normal and behavior is normal.   Nursing note and vitals reviewed.       DIFFERENTIAL DIAGNOSIS:   Recurrent chronic migraine with aura, no signs or symptoms of meningitis or subarachnoid hemorrhage    DIAGNOSTIC RESULTS     LABS:   Results for orders placed or performed during the hospital encounter of 04/18/19   CBC Auto Differential   Result Value Ref Range    WBC 12.3 (H) 4.8 - 10.8 thou/mm3    RBC 4.91 4.20 - 5.40 mill/mm3    Hemoglobin 14.3 12.0 - 16.0 gm/dl    Hematocrit 44.3 37.0 - 47.0 %    MCV 90.2 81.0 - 99.0 fL    MCH 29.1 26.0 - 33.0 pg    MCHC 32.3 32.2 - 35.5 gm/dl    RDW-CV 15.3 (H) 11.5 - 14.5 %    RDW-SD 51.0 (H) 35.0 - 45.0 fL    Platelets 905 791 - 823 thou/mm3    MPV 12.9 (H) 9.4 - 12.4 fL    Seg Neutrophils 65.7 %    Lymphocytes 21.5 %    Monocytes 8.5 %    Eosinophils 3.7 %    Basophils 0.4 %    Immature Granulocytes 0.2 %    Segs Absolute 8.1 (H) 1.8 - 7.7 thou/mm3    Lymphocytes # 2.6 1.0 - 4.8 thou/mm3    Monocytes # 1.0 0.4 - 1.3 thou/mm3    Eosinophils # 0.5 (H) 0.0 - 0.4 thou/mm3    Basophils # 0.0 0.0 - 0.1 thou/mm3    Immature Grans (Abs) 0.03 0.00 - 0.07 thou/mm3    nRBC 0 /100 wbc   Comprehensive Metabolic Panel w/ Reflex to MG   Result Value Ref Range    Glucose 103 70 - 108 mg/dL    CREATININE 0.6 0.4 - 1.2 mg/dL    BUN 16 7 - 22 mg/dL    Sodium 141 135 - 145 meq/L    Potassium reflex Magnesium 4.4 3.5 - 5.2 meq/L    Chloride 105 98 - 111 meq/L    CO2 24 23 - 33 meq/L    Calcium 9.6 8.5 - 10.5 mg/dL    AST 16 5 - 40 U/L    Alkaline Phosphatase 88 38 - 126 U/L    Total Protein 7.7 6.1 - 8.0 g/dL    Alb 4.4 3.5 - 5.1 g/dL    Total Bilirubin <0.2 (L) 0.3 - 1.2 mg/dL    ALT 15 11 - 66 U/L   Urinalysis Reflex to Culture   Result Value Ref Range    Glucose, Ur NEGATIVE NEGATIVE mg/dl    Bilirubin Urine NEGATIVE NEGATIVE    Ketones, Urine NEGATIVE NEGATIVE    Specific Gravity, Urine 1.011 1.002 - 1.03    Blood, Urine NEGATIVE NEGATIVE    pH, UA 7.5 5.0 - 9.0    Protein, UA NEGATIVE NEGATIVE    Urobilinogen, Urine 0.2 0.0 - 1.0 eu/dl INTRACRANIAL HEMORRHAGE, SUBDURAL OR EPIDURAL HEMATOMA, OR STROKE, thus I consider the discharge disposition reasonable. The patient and/or family and I have discussed the diagnosis and risks, and we agree with discharging home to follow-up with their primary doctor. We also discussed returning to the Emergency Department immediately if new or worsening symptoms occur. We have discussed the symptoms which are most concerning (e.g., changing or worsening pain, weakness, vomiting, fever) that necessitate immediate return. PATIENT REFERRED TO:  Misbah Hernandez MD  [address] [address]  [phone]    Go on 4/25/2019  For Recheck as scheduled    Darrius Ectors, APRN - [facility], [address]  [address] [facility] [address] [phone]    Schedule an appointment as soon as possible for a visit in 11 days  For Recheck, As needed    [address] [facility] [address] [place] [address],[address]    Return If Symptoms Worsen    Scribe:  Doris Madsen 4/19/19 12:27 AM Scribing for and in the presence of Dr. Tomas Rosa M.D. Margarita Chandler, 04/19/19 6:25 AM    Provider:  I personally performed the services described in the documentation, reviewed and edited the documentation which was dictatedto the scribe in my presence, and it accurately records my words and actions.     Dr. Tomas Rosa M.D 4/19/19 6:25 AM          Tomas Rosa MD  04/19/19 9375

## 2019-04-19 NOTE — ED NOTES
Patient to ED for migraine headache, nausea, emesis, diarrhea. Patient states that she was seen at urgent care for similar symptoms on Sunday, states that she was given an IV, symptoms resolved temporarily. Patient states that she sees a neurologist in [address] [place] [address].       Reina Love RN  04/18/19 5662

## 2019-04-19 NOTE — ED NOTES
Pt resting in bed with lights off for comfort and significant other at bedside. VS reassessed and stable. Pt updated on POC and verbalizes understanding as well as no further needs at this time. Will continue to monitor.       Alfie Beard RN  04/19/19 5583

## 2019-05-08 ENCOUNTER — PATIENT MESSAGE (OUTPATIENT)
Dept: FAMILY MEDICINE CLINIC | Age: 39
End: 2019-05-08

## 2019-05-08 NOTE — TELEPHONE ENCOUNTER
From: Cb Gaytan  To: Camella Paget, EVA - CNP  Sent: 5/8/2019 9:20 AM EDT  Subject: Prescription Question    Is there any way to get a refill on the toradal? And to reschedule an appointment?

## 2019-05-09 ENCOUNTER — OFFICE VISIT (OUTPATIENT)
Dept: FAMILY MEDICINE CLINIC | Age: 39
End: 2019-05-09
Payer: MEDICARE

## 2019-05-09 VITALS
RESPIRATION RATE: 16 BRPM | HEART RATE: 80 BPM | DIASTOLIC BLOOD PRESSURE: 76 MMHG | SYSTOLIC BLOOD PRESSURE: 124 MMHG | HEIGHT: 60 IN | WEIGHT: 203.1 LBS | BODY MASS INDEX: 39.87 KG/M2

## 2019-05-09 DIAGNOSIS — F31.60 BIPOLAR 1 DISORDER, MIXED (HCC): Primary | ICD-10-CM

## 2019-05-09 PROCEDURE — 4004F PT TOBACCO SCREEN RCVD TLK: CPT | Performed by: NURSE PRACTITIONER

## 2019-05-09 PROCEDURE — 99213 OFFICE O/P EST LOW 20 MIN: CPT | Performed by: NURSE PRACTITIONER

## 2019-05-09 PROCEDURE — G8427 DOCREV CUR MEDS BY ELIG CLIN: HCPCS | Performed by: NURSE PRACTITIONER

## 2019-05-09 PROCEDURE — G8417 CALC BMI ABV UP PARAM F/U: HCPCS | Performed by: NURSE PRACTITIONER

## 2019-05-09 RX ORDER — OLANZAPINE 5 MG/1
5 TABLET ORAL NIGHTLY
Qty: 30 TABLET | Refills: 1 | Status: SHIPPED | OUTPATIENT
Start: 2019-05-09 | End: 2019-05-23 | Stop reason: SDUPTHER

## 2019-05-09 RX ORDER — PROPRANOLOL HYDROCHLORIDE 10 MG/1
10 TABLET ORAL DAILY
COMMUNITY
End: 2019-10-16

## 2019-05-09 ASSESSMENT — ENCOUNTER SYMPTOMS
ABDOMINAL PAIN: 0
COUGH: 0
SHORTNESS OF BREATH: 0
NAUSEA: 0

## 2019-05-09 NOTE — PROGRESS NOTES
Visit Information    Have you changed or started any medications since your last visit including any over-the-counter medicines, vitamins, or herbal medicines? yes - see updated med list   Are you having any side effects from any of your medications? -  no  Have you stopped taking any of your medications? Is so, why? -  no    Have you seen any other physician or provider since your last visit? No  Have you had any other diagnostic tests since your last visit? No  Have you been seen in the emergency room and/or had an admission to a hospital since we last saw you? No  Have you had your routine dental cleaning in the past 6 months? n/a    Have you activated your [employer] account? If not, what are your barriers?  Yes     Patient Care Team:  EVA Saha CNP as PCP - General  EVA Saha CNP as PCP - S Attributed Provider  Maria De Jesus Stout MD as Consulting Physician (Otolaryngology)  Misbah Hernandez MD (Neurology)    Medical History Review  Past Medical, Family, and Social History reviewed and does contribute to the patient presenting condition    Health Maintenance   Topic Date Due    Pneumococcal 0-64 years Vaccine (1 of 1 - PPSV23) 02/26/1987    Varicella Vaccine (1 of 2 - 13+ 2-dose series) 02/26/1994    HIV screen  02/26/1996    Cervical cancer screen  07/10/2018    Flu vaccine (Season Ended) 10/01/2019 (Originally 9/1/2019)    DTaP/Tdap/Td vaccine (2 - Td) 09/19/2028

## 2019-05-09 NOTE — PATIENT INSTRUCTIONS
the four sessions. Tobacco cessation products are not included in the cost and are not provided by [facility] may receive a survey about your visit with us today. The feedback from our patients helps us identify what is working well and where the service to all patients can be enhanced. Thank you!

## 2019-05-09 NOTE — PROGRESS NOTES
Subjective:      Patient ID: Noe Gomes is a 46 y.o. female. HPI: Acute for mood    Chief Complaint   Patient presents with    Anxiety     getting worse, has seen counselor at [facility] Depression     On Seroquel 50 mg and Wellbutrin 300 mg XL for anxiety and mood. Seroquel causes too much drowsiness. Having suicidal thoughts - denies thoughts in office today. Mind is overwhelmed. Irritable. Following with Counselor at [facility]. Has talked with [facility]. Has been on Celexa, Prozac, Zoloft, Lexapro, Effexor, Cymbalta, Abilify, Risperdal, Lamictal and Buspar in past.     Vitals:    05/09/19 1119   BP: 124/76   Pulse: 80   Resp: 16       Review of Systems   Constitutional: Negative for chills and fever. HENT: Negative. Respiratory: Negative for cough and shortness of breath. Cardiovascular: Negative for chest pain. Gastrointestinal: Negative for abdominal pain and nausea. Musculoskeletal: Positive for neck pain. Skin: Negative for rash. Neurological: Positive for headaches. Negative for dizziness and light-headedness. Psychiatric/Behavioral: Negative. Objective:   Physical Exam   Constitutional: Vital signs are normal. No distress. Eyes: Pupils are equal, round, and reactive to light. EOM are normal.   Neck: Normal range of motion. Neck supple. Cardiovascular: Normal rate and regular rhythm. No murmur heard. Pulmonary/Chest: Effort normal and breath sounds normal. She has no wheezes. Abdominal: Soft. Bowel sounds are normal. She exhibits no distension. There is no tenderness. Musculoskeletal: Normal range of motion. She exhibits no tenderness. Skin: Skin is warm and dry. No rash noted. Psychiatric: Her mood appears anxious. She is hyperactive and withdrawn. Thought content is paranoid. She expresses impulsivity. She exhibits a depressed mood. Assessment:       Diagnosis Orders   1.  Bipolar 1 disorder, mixed (HCC)  OLANZapine (ZYPREXA) 5 MG tablet           Plan:      Zyprexa 5 mg   Continue with counselor  [address] if thoughts worsen  Has good support at home  RTO in 2 weeks        Yousif Russo, EVA - CNP

## 2019-05-12 ENCOUNTER — PATIENT MESSAGE (OUTPATIENT)
Dept: FAMILY MEDICINE CLINIC | Age: 39
End: 2019-05-12

## 2019-05-13 RX ORDER — KETOROLAC TROMETHAMINE 10 MG/1
10 TABLET, FILM COATED ORAL EVERY 8 HOURS PRN
Qty: 15 TABLET | Refills: 0 | Status: SHIPPED | OUTPATIENT
Start: 2019-05-13 | End: 2019-06-10 | Stop reason: SDUPTHER

## 2019-05-14 DIAGNOSIS — F41.9 ANXIETY: ICD-10-CM

## 2019-05-14 DIAGNOSIS — G44.209 TENSION HEADACHE: ICD-10-CM

## 2019-05-14 RX ORDER — TIZANIDINE 4 MG/1
4 TABLET ORAL EVERY 8 HOURS PRN
Qty: 90 TABLET | Refills: 5 | Status: SHIPPED | OUTPATIENT
Start: 2019-05-14 | End: 2019-07-29 | Stop reason: SDUPTHER

## 2019-05-14 RX ORDER — HYDROXYZINE 50 MG/1
50-100 TABLET, FILM COATED ORAL EVERY 6 HOURS PRN
Qty: 60 TABLET | Refills: 1 | Status: SHIPPED | OUTPATIENT
Start: 2019-05-14 | End: 2019-10-16

## 2019-05-23 ENCOUNTER — OFFICE VISIT (OUTPATIENT)
Dept: FAMILY MEDICINE CLINIC | Age: 39
End: 2019-05-23
Payer: MEDICARE

## 2019-05-23 VITALS
BODY MASS INDEX: 41.6 KG/M2 | HEIGHT: 60 IN | DIASTOLIC BLOOD PRESSURE: 70 MMHG | RESPIRATION RATE: 16 BRPM | HEART RATE: 80 BPM | WEIGHT: 211.9 LBS | SYSTOLIC BLOOD PRESSURE: 102 MMHG | TEMPERATURE: 98 F

## 2019-05-23 DIAGNOSIS — F31.60 BIPOLAR 1 DISORDER, MIXED (HCC): Primary | ICD-10-CM

## 2019-05-23 DIAGNOSIS — S39.012A STRAIN OF LUMBAR REGION, INITIAL ENCOUNTER: ICD-10-CM

## 2019-05-23 DIAGNOSIS — M51.36 DDD (DEGENERATIVE DISC DISEASE), LUMBAR: ICD-10-CM

## 2019-05-23 PROCEDURE — G8427 DOCREV CUR MEDS BY ELIG CLIN: HCPCS | Performed by: NURSE PRACTITIONER

## 2019-05-23 PROCEDURE — G8417 CALC BMI ABV UP PARAM F/U: HCPCS | Performed by: NURSE PRACTITIONER

## 2019-05-23 PROCEDURE — 99213 OFFICE O/P EST LOW 20 MIN: CPT | Performed by: NURSE PRACTITIONER

## 2019-05-23 PROCEDURE — 4004F PT TOBACCO SCREEN RCVD TLK: CPT | Performed by: NURSE PRACTITIONER

## 2019-05-23 RX ORDER — OLANZAPINE 10 MG/1
10 TABLET ORAL NIGHTLY
Qty: 30 TABLET | Refills: 1 | Status: SHIPPED | OUTPATIENT
Start: 2019-05-23 | End: 2019-05-23 | Stop reason: SDUPTHER

## 2019-05-23 RX ORDER — OLANZAPINE 10 MG/1
10 TABLET ORAL NIGHTLY
Qty: 30 TABLET | Refills: 1 | Status: SHIPPED | OUTPATIENT
Start: 2019-05-23 | End: 2019-06-24 | Stop reason: SDUPTHER

## 2019-05-23 RX ORDER — PREDNISONE 20 MG/1
TABLET ORAL
Qty: 25 TABLET | Refills: 0 | Status: SHIPPED | OUTPATIENT
Start: 2019-05-23 | End: 2019-05-23 | Stop reason: SDUPTHER

## 2019-05-23 RX ORDER — PREDNISONE 20 MG/1
TABLET ORAL
Qty: 25 TABLET | Refills: 0 | Status: SHIPPED | OUTPATIENT
Start: 2019-05-23 | End: 2020-01-21

## 2019-05-23 RX ORDER — QUETIAPINE FUMARATE 50 MG/1
50 TABLET, FILM COATED ORAL NIGHTLY
Qty: 30 TABLET | Refills: 1 | Status: SHIPPED | OUTPATIENT
Start: 2019-05-23 | End: 2019-05-23 | Stop reason: SDUPTHER

## 2019-05-23 RX ORDER — QUETIAPINE FUMARATE 50 MG/1
50 TABLET, FILM COATED ORAL NIGHTLY
Qty: 30 TABLET | Refills: 1 | Status: SHIPPED | OUTPATIENT
Start: 2019-05-23 | End: 2019-06-24 | Stop reason: SDUPTHER

## 2019-05-23 RX ORDER — QUETIAPINE FUMARATE 50 MG/1
50 TABLET, FILM COATED ORAL 2 TIMES DAILY
COMMUNITY
End: 2019-05-23 | Stop reason: SDUPTHER

## 2019-05-23 ASSESSMENT — ENCOUNTER SYMPTOMS
COUGH: 0
BACK PAIN: 1
ABDOMINAL PAIN: 0
NAUSEA: 0
SHORTNESS OF BREATH: 0

## 2019-05-23 NOTE — PROGRESS NOTES
Subjective:      Patient ID: Fabian Nunez is a 46 y.o. female. HPI: Acute for mood    Chief Complaint   Patient presents with    2 Week Follow-Up     Bipolar     On Seroquel 50 mg HS and Zyprexa 5 mg. She is having more good days than bad days. Sleeping better. Anxiety bouts are not as harsh although continues to have anxiety. Following with Counselor at [facility]. Has been on Celexa, Prozac, Zoloft, Lexapro, Effexor, Cymbalta, Abilify, Risperdal, Lamictal and Buspar in past.     She is going to get her medical marijuana card next week. Vitals:    05/23/19 0911   BP: 102/70   Pulse: 80   Resp: 16   Temp: 98 °F (36.7 °C)     Following with Dr. Jose Gay for her low back strain. She is going through decompression therapy. Fall that lead to back pain. Stiffness. Pain with standing. Occasional shooting pad. Denies bowel or bladder changes. Denies leg weakness or numbness. Review of Systems   Constitutional: Negative for chills and fever. HENT: Negative. Respiratory: Negative for cough and shortness of breath. Cardiovascular: Negative for chest pain. Gastrointestinal: Negative for abdominal pain and nausea. Musculoskeletal: Positive for back pain and neck pain. Skin: Negative for rash. Neurological: Positive for headaches. Negative for dizziness and light-headedness. Psychiatric/Behavioral: Negative for sleep disturbance. The patient is nervous/anxious and is hyperactive. Objective:   Physical Exam   Constitutional: Vital signs are normal. No distress. Eyes: Pupils are equal, round, and reactive to light. EOM are normal.   Neck: Normal range of motion. Neck supple. Cardiovascular: Normal rate and regular rhythm. No murmur heard. Pulmonary/Chest: Effort normal and breath sounds normal. She has no wheezes. Abdominal: Soft. Bowel sounds are normal. She exhibits no distension. There is no tenderness. Musculoskeletal: Normal range of motion.  She exhibits no tenderness. Skin: Skin is warm and dry. No rash noted. Psychiatric: Her mood appears anxious. She is not hyperactive and not withdrawn. Thought content is not paranoid. She does not express impulsivity. She exhibits a depressed mood. Assessment:       Diagnosis Orders   1. Bipolar 1 disorder, mixed (HCC)  QUEtiapine (SEROQUEL) 50 MG tablet    OLANZapine (ZYPREXA) 10 MG tablet   2. DDD (degenerative disc disease), lumbar  predniSONE (DELTASONE) 20 MG tablet   3.  Strain of lumbar region, initial encounter  predniSONE (DELTASONE) 20 MG tablet           Plan:      Increase Zyprexa 10 mg   Continue Seroquel HS  Continue with counselor  2 week Prednisone Taper  Follow up with Chiropractor  RTO in 4 weeks        EVA Amaya - CNP

## 2019-05-31 DIAGNOSIS — G43.119 INTRACTABLE MIGRAINE WITH AURA WITHOUT STATUS MIGRAINOSUS: Primary | ICD-10-CM

## 2019-05-31 RX ORDER — DIVALPROEX SODIUM 500 MG/1
500 TABLET, EXTENDED RELEASE ORAL DAILY
Qty: 30 TABLET | Refills: 2 | Status: SHIPPED | OUTPATIENT
Start: 2019-05-31 | End: 2019-09-03 | Stop reason: SDUPTHER

## 2019-06-06 ENCOUNTER — HOSPITAL ENCOUNTER (EMERGENCY)
Age: 39
Discharge: HOME | End: 2019-06-06
Payer: MEDICARE

## 2019-06-06 VITALS
WEIGHT: 204 LBS | OXYGEN SATURATION: 98 % | HEIGHT: 60 IN | HEART RATE: 115 BPM | DIASTOLIC BLOOD PRESSURE: 80 MMHG | BODY MASS INDEX: 40.05 KG/M2 | RESPIRATION RATE: 20 BRPM | SYSTOLIC BLOOD PRESSURE: 131 MMHG | TEMPERATURE: 98.6 F

## 2019-06-06 DIAGNOSIS — Z72.0 TOBACCO USE: ICD-10-CM

## 2019-06-06 DIAGNOSIS — J44.1 COPD EXACERBATION (HCC): Primary | ICD-10-CM

## 2019-06-06 PROCEDURE — 99213 OFFICE O/P EST LOW 20 MIN: CPT

## 2019-06-06 PROCEDURE — 99213 OFFICE O/P EST LOW 20 MIN: CPT | Performed by: NURSE PRACTITIONER

## 2019-06-06 RX ORDER — DOXYCYCLINE HYCLATE 100 MG
100 TABLET ORAL 2 TIMES DAILY
Qty: 14 TABLET | Refills: 0 | Status: SHIPPED | OUTPATIENT
Start: 2019-06-06 | End: 2019-06-13

## 2019-06-06 ASSESSMENT — ENCOUNTER SYMPTOMS
SORE THROAT: 0
CHEST TIGHTNESS: 0
NAUSEA: 0
COUGH: 1
VOMITING: 0
SHORTNESS OF BREATH: 1
SINUS PRESSURE: 0
ABDOMINAL PAIN: 0
WHEEZING: 1
DIARRHEA: 0

## 2019-06-06 NOTE — ED TRIAGE NOTES
To room with c/o productive cough and headache that started 3 days ago. She recently injured back and has been seeing Dr. Jesse Coleman and ONEYDA Maddox for that. She is currently out of Toradol. She left work early due to headache which reports is giving double vision. She has had double vision before with Migraine headaches.

## 2019-06-06 NOTE — ED PROVIDER NOTES
[phone]       Chief Complaint   Patient presents with    Cough    Headache       Nurses Notes reviewed and I agree except as noted in the HPI. HISTORY OF PRESENT ILLNESS   Elvia Bloch is a 46 y.o. female who presents for evaluation of a cough and headache that started 3 days ago. Patient reports a history of COPD, emphysema, and she is a current  Daily smoker. She describes her headache as her normal migraine that she always has. She is out of Toradol and has not picked up her headache medication at the pharmacy that was prescribed by her neurologist.  She reports that she left early from work today and is requesting a note. REVIEW OF SYSTEMS     Review of Systems   Constitutional: Negative for chills and fever. HENT: Negative for congestion, sinus pressure and sore throat. Respiratory: Positive for cough, shortness of breath and wheezing. Negative for chest tightness. Cardiovascular: Negative for chest pain. Gastrointestinal: Negative for abdominal pain, diarrhea, nausea and vomiting. Skin: Negative for rash. Allergic/Immunologic: Negative for environmental allergies and food allergies. Neurological: Positive for headaches (chronic migraines). Psychiatric/Behavioral: Negative for suicidal ideas. PAST MEDICAL HISTORY         Diagnosis Date    Anxiety     Asthma     COPD (chronic obstructive pulmonary disease) [facility] [address]     Depression     Migraines        SURGICAL HISTORY     Patient  has a past surgical history that includes Tonsillectomy and adenoidectomy (1989); Ovary removal (2000); Cholecystectomy (2003); Tubal ligation (2005); Hysterectomy (2011); Dilation and curettage of uterus (2009); IGS Endo Sinus Sx (07/12/2017); and Tooth Extraction.     CURRENT MEDICATIONS       Discharge Medication List as of 6/6/2019 11:43 AM      CONTINUE these medications which have NOT CHANGED    Details   divalproex (DEPAKOTE ER) 500 MG extended release tablet Take 1 tablet by mouth daily, Disp-30 tablet, R-2Normal      QUEtiapine (SEROQUEL) 50 MG tablet Take 1 tablet by mouth nightly, Disp-30 tablet, R-1Normal      OLANZapine (ZYPREXA) 10 MG tablet Take 1 tablet by mouth nightly, Disp-30 tablet, R-1Normal      predniSONE (DELTASONE) 20 MG tablet Take 3 tabs x 4 days, take 2 tabs x 4 days, take 1 tab x 4 days and take 1/2 tab x 2 days, Disp-25 tablet, R-0Normal      tiZANidine (ZANAFLEX) 4 MG tablet Take 1 tablet by mouth every 8 hours as needed (neck pain), Disp-90 tablet, R-5Please consider 90 day supplies to promote better adherenceNormal      hydrOXYzine (ATARAX) 50 MG tablet Take 1-2 tablets by mouth every 6 hours as needed for Itching, Disp-60 tablet, R-1Please consider 90 day supplies to promote better adherenceNormal      ketorolac (TORADOL) 10 MG tablet Take 1 tablet by mouth every 8 hours as needed for Pain, Disp-15 tablet, R-0Normal      propranolol (INDERAL) 10 MG tablet Take 10 mg by mouth dailyHistorical Med      albuterol sulfate HFA (VENTOLIN HFA) 108 (90 Base) MCG/ACT inhaler Inhale 1-2 puffs into the lungs every 6 hours as needed for Wheezing, Disp-18 g, R-3Please consider 90 day supplies to promote better adherenceNormal      gabapentin (NEURONTIN) 800 MG tablet TAKE 1 TABLET BY MOUTH 4 TIMES DAILY, Disp-120 tablet, R-5Normal      buPROPion (WELLBUTRIN XL) 300 MG extended release tablet TAKE ONE TABLET BY MOUTH ONCE DAILY IN THE MORNING, Disp-30 tablet, R-11Please consider 90 day supplies to promote better adherenceNormal      oxybutynin (DITROPAN-XL) 10 MG extended release tablet Take 1 tablet by mouth daily, Disp-30 tablet, R-11Normal      budesonide-formoterol (SYMBICORT) 80-4.5 MCG/ACT AERO Inhale 2 puffs into the lungs 2 times daily Rinse mouth after use., Disp-1 Inhaler, R-11Normal      Probiotic Product (PROBIOTIC PO) Take by mouth daily      Multiple Vitamins-Calcium (ONE-A-DAY WOMENS PO) Take by Resp: 20   Temp: 98.6 °F (37 °C)   SpO2: 98%   Weight: 204 lb (92.5 kg)   Height: 5' (1.524 m)       Medications - No data to display  PROCEDURES:  None  FINALIMPRESSION      1. COPD exacerbation ([address]    2. Tobacco use        DISPOSITION/PLAN   DISPOSITION    Discharge   COPD exacerbation, will treat with Doxy. She has been advised to continue with her inhaler and steroids as directed. Advised patient that Toradol will need to refilled by the provider that orders this for her if appropriate, not the urgent care. Physical assessment findings, diagnostic testing(s) if applicable, and vital signs reviewed with patient/patient representative. Questions answered. Medications as directed, including OTC medications for supportive care. Education provided on medications. Differential diagnosis(s) discussed with patient/patient representative. Home care/self care instructions reviewed with patient/patient representative. Patient is to follow-up with family care provider in 2-3 days if no improvement. Patient is to go to the emergency department if symptoms worsen. Patient/patient representative is aware of care plan, questions answered, verbalizes understanding and is in agreement. Teach back method used for patient/patient representative teaching(s) and printed instructions attached to after visit summary.         PATIENT REFERRED TO:  Rima Breaux, EVA - CNP  [address] [place] [address], [address] [place] [address]  [address] [place] [address] [facility]  [phone]    Schedule an appointment as soon as possible for a visit in 3 days  for further evalation, If symptoms worsen, GO DIRECTLY TO [place] [address] [facility]  [address]  [phone]    As needed, If symptoms worsen, GO DIRECTLY TO THE EMERGENCY DEPARTMENT    DISCHARGE MEDICATIONS:  Discharge Medication List as of 6/6/2019 11:43 AM      START taking these medications    Details   doxycycline hyclate (VIBRA-TABS) 100 MG tablet Take 1 tablet by mouth 2 times daily for 7 days, Disp-14 tablet, R-0Normal           Discharge Medication List as of 6/6/2019 11:43 AM          Carlos Louie, [address] Mario Townsend, EVA - CNP  06/06/19 5552

## 2019-06-10 RX ORDER — KETOROLAC TROMETHAMINE 10 MG/1
10 TABLET, FILM COATED ORAL EVERY 8 HOURS PRN
Qty: 15 TABLET | Refills: 0 | Status: SHIPPED | OUTPATIENT
Start: 2019-06-10 | End: 2019-07-15 | Stop reason: SDUPTHER

## 2019-06-16 DIAGNOSIS — J42 CHRONIC BRONCHITIS, UNSPECIFIED CHRONIC BRONCHITIS TYPE (HCC): ICD-10-CM

## 2019-06-17 RX ORDER — BUDESONIDE AND FORMOTEROL FUMARATE DIHYDRATE 80; 4.5 UG/1; UG/1
2 AEROSOL RESPIRATORY (INHALATION) 2 TIMES DAILY
Qty: 1 INHALER | Refills: 11 | Status: SHIPPED | OUTPATIENT
Start: 2019-06-17 | End: 2020-10-15

## 2019-06-24 ENCOUNTER — OFFICE VISIT (OUTPATIENT)
Dept: FAMILY MEDICINE CLINIC | Age: 39
End: 2019-06-24
Payer: MEDICARE

## 2019-06-24 VITALS
WEIGHT: 215.1 LBS | HEIGHT: 60 IN | RESPIRATION RATE: 20 BRPM | HEART RATE: 76 BPM | SYSTOLIC BLOOD PRESSURE: 104 MMHG | DIASTOLIC BLOOD PRESSURE: 72 MMHG | BODY MASS INDEX: 42.23 KG/M2

## 2019-06-24 DIAGNOSIS — J42 CHRONIC BRONCHITIS, UNSPECIFIED CHRONIC BRONCHITIS TYPE (HCC): ICD-10-CM

## 2019-06-24 DIAGNOSIS — F31.60 BIPOLAR 1 DISORDER, MIXED (HCC): Primary | ICD-10-CM

## 2019-06-24 PROCEDURE — 4004F PT TOBACCO SCREEN RCVD TLK: CPT | Performed by: NURSE PRACTITIONER

## 2019-06-24 PROCEDURE — 3023F SPIROM DOC REV: CPT | Performed by: NURSE PRACTITIONER

## 2019-06-24 PROCEDURE — G8926 SPIRO NO PERF OR DOC: HCPCS | Performed by: NURSE PRACTITIONER

## 2019-06-24 PROCEDURE — G8427 DOCREV CUR MEDS BY ELIG CLIN: HCPCS | Performed by: NURSE PRACTITIONER

## 2019-06-24 PROCEDURE — 99213 OFFICE O/P EST LOW 20 MIN: CPT | Performed by: NURSE PRACTITIONER

## 2019-06-24 PROCEDURE — G8417 CALC BMI ABV UP PARAM F/U: HCPCS | Performed by: NURSE PRACTITIONER

## 2019-06-24 RX ORDER — QUETIAPINE FUMARATE 50 MG/1
50 TABLET, FILM COATED ORAL NIGHTLY
Qty: 30 TABLET | Refills: 1 | Status: SHIPPED | OUTPATIENT
Start: 2019-06-24 | End: 2019-09-03 | Stop reason: SDUPTHER

## 2019-06-24 RX ORDER — OLANZAPINE 10 MG/1
10 TABLET ORAL NIGHTLY
Qty: 30 TABLET | Refills: 1 | Status: SHIPPED | OUTPATIENT
Start: 2019-06-24 | End: 2019-10-16 | Stop reason: ALTCHOICE

## 2019-06-24 ASSESSMENT — ENCOUNTER SYMPTOMS
BACK PAIN: 1
ABDOMINAL PAIN: 0
NAUSEA: 0
COUGH: 0
SHORTNESS OF BREATH: 0

## 2019-06-24 NOTE — PROGRESS NOTES
Visit Information    Have you changed or started any medications since your last visit including any over-the-counter medicines, vitamins, or herbal medicines? no   Are you having any side effects from any of your medications? -  no  Have you stopped taking any of your medications? Is so, why? -  no    Have you seen any other physician or provider since your last visit? No  Have you had any other diagnostic tests since your last visit? No  Have you been seen in the emergency room and/or had an admission to a hospital since we last saw you? No  Have you had your routine dental cleaning in the past 6 months? no    Have you activated your [employer] account? If not, what are your barriers?  Yes     Patient Care Team:  EVA Saha CNP as PCP - General  EVA Saha CNP as PCP - [facility] Provider  Maria De Jesus Stout MD as Consulting Physician (Otolaryngology)  Misbah Hernandez MD (Neurology)    Medical History Review  Past Medical, Family, and Social History reviewed and does contribute to the patient presenting condition    Health Maintenance   Topic Date Due    Pneumococcal 0-64 years Vaccine (1 of 1 - PPSV23) 02/26/1987    Varicella Vaccine (1 of 2 - 13+ 2-dose series) 02/26/1994    HIV screen  02/26/1996    Cervical cancer screen  07/10/2018    Flu vaccine (Season Ended) 10/01/2019 (Originally 9/1/2019)    DTaP/Tdap/Td vaccine (2 - Td) 09/19/2028

## 2019-06-24 NOTE — PROGRESS NOTES
Subjective:      Patient ID: Prudencio Bridges is a 46 y.o. female. HPI  : Acute for mood    Chief Complaint   Patient presents with    1 Month Follow-Up     bipolar     On Seroquel 50 mg HS and Zyprexa 10 mg. Improvement noted from going from 5 mg to 10 mg of ZYprexa. More evened out in her mood. Sleeping better. Anxiety bouts are not as harsh although continues to have anxiety. Following with Counselor at [facility]. Has been on Celexa, Prozac, Zoloft, Lexapro, Effexor, Cymbalta, Abilify, Risperdal, Lamictal and Buspar in past.     Vitals:    06/24/19 1420   BP: 104/72   Pulse: 76   Resp: 20       Review of Systems   Constitutional: Negative for chills and fever. HENT: Negative. Respiratory: Negative for cough and shortness of breath. Cardiovascular: Negative for chest pain. Gastrointestinal: Negative for abdominal pain and nausea. Musculoskeletal: Positive for back pain and neck pain. Skin: Negative for rash. Neurological: Positive for headaches. Negative for dizziness and light-headedness. Psychiatric/Behavioral: Negative for sleep disturbance. The patient is nervous/anxious. The patient is not hyperactive. Objective:   Physical Exam   Constitutional: Vital signs are normal. No distress. Eyes: Pupils are equal, round, and reactive to light. EOM are normal.   Neck: Normal range of motion. Neck supple. Cardiovascular: Normal rate and regular rhythm. No murmur heard. Pulmonary/Chest: Effort normal and breath sounds normal. She has no wheezes. Abdominal: Soft. Bowel sounds are normal. She exhibits no distension. There is no tenderness. Musculoskeletal: Normal range of motion. She exhibits no tenderness. Skin: Skin is warm and dry. No rash noted. Psychiatric: Her mood appears anxious. She is not hyperactive and not withdrawn. Thought content is not paranoid. Cognition and memory are not impaired. She does not express impulsivity.  She does not exhibit a depressed mood. Assessment:       Diagnosis Orders   1.  Bipolar 1 disorder, mixed (HCC)  QUEtiapine (SEROQUEL) 50 MG tablet    OLANZapine (ZYPREXA) 10 MG tablet           Plan:      Continue Zyprexa 10 mg and Seroquel HS  Continue with counselor  Follow up with Specialists  RTO in 6 months        EVA Tucker - CNP

## 2019-07-15 RX ORDER — KETOROLAC TROMETHAMINE 10 MG/1
10 TABLET, FILM COATED ORAL EVERY 8 HOURS PRN
Qty: 15 TABLET | Refills: 0 | Status: SHIPPED | OUTPATIENT
Start: 2019-07-15 | End: 2019-08-12 | Stop reason: SDUPTHER

## 2019-07-29 DIAGNOSIS — G44.209 TENSION HEADACHE: ICD-10-CM

## 2019-07-30 RX ORDER — TIZANIDINE 4 MG/1
4 TABLET ORAL EVERY 8 HOURS PRN
Qty: 90 TABLET | Refills: 5 | Status: SHIPPED | OUTPATIENT
Start: 2019-07-30 | End: 2020-01-06 | Stop reason: SDUPTHER

## 2019-07-30 RX ORDER — GABAPENTIN 800 MG/1
800 TABLET ORAL 4 TIMES DAILY
Qty: 120 TABLET | Refills: 5 | Status: SHIPPED | OUTPATIENT
Start: 2019-07-30 | End: 2020-01-15 | Stop reason: SDUPTHER

## 2019-08-13 RX ORDER — KETOROLAC TROMETHAMINE 10 MG/1
10 TABLET, FILM COATED ORAL EVERY 8 HOURS PRN
Qty: 15 TABLET | Refills: 0 | Status: SHIPPED | OUTPATIENT
Start: 2019-08-13 | End: 2019-09-17 | Stop reason: SDUPTHER

## 2019-09-03 DIAGNOSIS — F31.60 BIPOLAR 1 DISORDER, MIXED (HCC): ICD-10-CM

## 2019-09-03 DIAGNOSIS — G43.119 INTRACTABLE MIGRAINE WITH AURA WITHOUT STATUS MIGRAINOSUS: Primary | ICD-10-CM

## 2019-09-03 DIAGNOSIS — N32.81 OAB (OVERACTIVE BLADDER): ICD-10-CM

## 2019-09-03 RX ORDER — QUETIAPINE FUMARATE 50 MG/1
50 TABLET, FILM COATED ORAL NIGHTLY
Qty: 30 TABLET | Refills: 1 | Status: SHIPPED | OUTPATIENT
Start: 2019-09-03 | End: 2019-10-16 | Stop reason: ALTCHOICE

## 2019-09-03 RX ORDER — OXYBUTYNIN CHLORIDE 10 MG/1
TABLET, EXTENDED RELEASE ORAL
Qty: 30 TABLET | Refills: 11 | Status: SHIPPED | OUTPATIENT
Start: 2019-09-03 | End: 2020-07-09 | Stop reason: SDUPTHER

## 2019-09-04 RX ORDER — DIVALPROEX SODIUM 500 MG/1
TABLET, EXTENDED RELEASE ORAL
Qty: 30 TABLET | Refills: 2 | Status: SHIPPED | OUTPATIENT
Start: 2019-09-04 | End: 2019-10-16

## 2019-09-05 ENCOUNTER — TELEPHONE (OUTPATIENT)
Dept: FAMILY MEDICINE CLINIC | Age: 39
End: 2019-09-05

## 2019-09-05 DIAGNOSIS — J42 CHRONIC BRONCHITIS, UNSPECIFIED CHRONIC BRONCHITIS TYPE (HCC): Primary | ICD-10-CM

## 2019-09-05 RX ORDER — ALBUTEROL SULFATE 2.5 MG/3ML
2.5 SOLUTION RESPIRATORY (INHALATION) EVERY 6 HOURS PRN
Qty: 120 EACH | Refills: 3 | Status: SHIPPED | OUTPATIENT
Start: 2019-09-05 | End: 2020-12-05 | Stop reason: SDUPTHER

## 2019-09-18 RX ORDER — KETOROLAC TROMETHAMINE 10 MG/1
10 TABLET, FILM COATED ORAL EVERY 8 HOURS PRN
Qty: 20 TABLET | Refills: 0 | Status: SHIPPED | OUTPATIENT
Start: 2019-09-18 | End: 2019-10-12 | Stop reason: SDUPTHER

## 2019-09-27 ENCOUNTER — PATIENT MESSAGE (OUTPATIENT)
Dept: FAMILY MEDICINE CLINIC | Age: 39
End: 2019-09-27

## 2019-09-27 DIAGNOSIS — K04.7 DENTAL INFECTION: Primary | ICD-10-CM

## 2019-09-30 RX ORDER — AMOXICILLIN 875 MG/1
875 TABLET, COATED ORAL 2 TIMES DAILY
Qty: 20 TABLET | Refills: 0 | Status: SHIPPED | OUTPATIENT
Start: 2019-09-30 | End: 2019-10-10

## 2019-09-30 NOTE — TELEPHONE ENCOUNTER
From: Parish Chan  To: EVA Buitrago - CNP  Sent: 9/27/2019 3:19 PM EDT  Subject: Prescription Question    Is there any way I could get amoxicillin?  My bottom back tooth is cracked in half n starting to hurt

## 2019-10-14 RX ORDER — KETOROLAC TROMETHAMINE 10 MG/1
10 TABLET, FILM COATED ORAL EVERY 8 HOURS PRN
Qty: 20 TABLET | Refills: 0 | Status: SHIPPED | OUTPATIENT
Start: 2019-10-14 | End: 2019-11-08 | Stop reason: SDUPTHER

## 2019-10-16 ENCOUNTER — OFFICE VISIT (OUTPATIENT)
Dept: FAMILY MEDICINE CLINIC | Age: 39
End: 2019-10-16
Payer: MEDICARE

## 2019-10-16 ENCOUNTER — PATIENT MESSAGE (OUTPATIENT)
Dept: FAMILY MEDICINE CLINIC | Age: 39
End: 2019-10-16

## 2019-10-16 VITALS
DIASTOLIC BLOOD PRESSURE: 78 MMHG | SYSTOLIC BLOOD PRESSURE: 116 MMHG | WEIGHT: 205.4 LBS | BODY MASS INDEX: 40.11 KG/M2 | OXYGEN SATURATION: 98 % | TEMPERATURE: 98.1 F | RESPIRATION RATE: 16 BRPM | HEART RATE: 61 BPM

## 2019-10-16 DIAGNOSIS — T14.8XXA WOUND OF SKIN: ICD-10-CM

## 2019-10-16 DIAGNOSIS — Z23 NEED FOR INFLUENZA VACCINATION: ICD-10-CM

## 2019-10-16 DIAGNOSIS — F41.1 GAD (GENERALIZED ANXIETY DISORDER): ICD-10-CM

## 2019-10-16 DIAGNOSIS — R09.81 NASAL CONGESTION: Primary | ICD-10-CM

## 2019-10-16 DIAGNOSIS — G43.119 INTRACTABLE MIGRAINE WITH AURA WITHOUT STATUS MIGRAINOSUS: ICD-10-CM

## 2019-10-16 DIAGNOSIS — F31.60 BIPOLAR 1 DISORDER, MIXED (HCC): Primary | ICD-10-CM

## 2019-10-16 PROCEDURE — G8427 DOCREV CUR MEDS BY ELIG CLIN: HCPCS | Performed by: NURSE PRACTITIONER

## 2019-10-16 PROCEDURE — 90686 IIV4 VACC NO PRSV 0.5 ML IM: CPT | Performed by: NURSE PRACTITIONER

## 2019-10-16 PROCEDURE — 90471 IMMUNIZATION ADMIN: CPT | Performed by: NURSE PRACTITIONER

## 2019-10-16 PROCEDURE — G8482 FLU IMMUNIZE ORDER/ADMIN: HCPCS | Performed by: NURSE PRACTITIONER

## 2019-10-16 PROCEDURE — 99213 OFFICE O/P EST LOW 20 MIN: CPT | Performed by: NURSE PRACTITIONER

## 2019-10-16 PROCEDURE — G8417 CALC BMI ABV UP PARAM F/U: HCPCS | Performed by: NURSE PRACTITIONER

## 2019-10-16 PROCEDURE — 4004F PT TOBACCO SCREEN RCVD TLK: CPT | Performed by: NURSE PRACTITIONER

## 2019-10-16 RX ORDER — DIVALPROEX SODIUM 500 MG/1
TABLET, EXTENDED RELEASE ORAL
Qty: 60 TABLET | Refills: 2 | Status: SHIPPED | OUTPATIENT
Start: 2019-10-16 | End: 2020-06-01 | Stop reason: SDUPTHER

## 2019-10-16 ASSESSMENT — ENCOUNTER SYMPTOMS: SHORTNESS OF BREATH: 0

## 2019-10-17 RX ORDER — SODIUM CHLORIDE/ALOE VERA
GEL (GRAM) NASAL PRN
Qty: 1 TUBE | Refills: 3 | Status: SHIPPED | OUTPATIENT
Start: 2019-10-17 | End: 2020-05-05 | Stop reason: SDUPTHER

## 2019-11-08 RX ORDER — KETOROLAC TROMETHAMINE 10 MG/1
10 TABLET, FILM COATED ORAL EVERY 8 HOURS PRN
Qty: 20 TABLET | Refills: 0 | Status: SHIPPED | OUTPATIENT
Start: 2019-11-08 | End: 2019-12-09 | Stop reason: SDUPTHER

## 2019-12-09 RX ORDER — KETOROLAC TROMETHAMINE 10 MG/1
10 TABLET, FILM COATED ORAL EVERY 8 HOURS PRN
Qty: 20 TABLET | Refills: 0 | Status: SHIPPED | OUTPATIENT
Start: 2019-12-09 | End: 2020-01-06 | Stop reason: SDUPTHER

## 2020-01-06 RX ORDER — KETOROLAC TROMETHAMINE 10 MG/1
10 TABLET, FILM COATED ORAL EVERY 8 HOURS PRN
Qty: 20 TABLET | Refills: 0 | Status: SHIPPED | OUTPATIENT
Start: 2020-01-06 | End: 2020-02-08 | Stop reason: SDUPTHER

## 2020-01-06 RX ORDER — TIZANIDINE 4 MG/1
4 TABLET ORAL EVERY 8 HOURS PRN
Qty: 90 TABLET | Refills: 5 | Status: SHIPPED | OUTPATIENT
Start: 2020-01-06 | End: 2020-03-01 | Stop reason: SDUPTHER

## 2020-01-06 RX ORDER — ALBUTEROL SULFATE 90 UG/1
1-2 AEROSOL, METERED RESPIRATORY (INHALATION) EVERY 6 HOURS PRN
Qty: 18 G | Refills: 3 | Status: SHIPPED | OUTPATIENT
Start: 2020-01-06 | End: 2020-06-09 | Stop reason: SDUPTHER

## 2020-01-15 RX ORDER — GABAPENTIN 800 MG/1
800 TABLET ORAL 4 TIMES DAILY
Qty: 120 TABLET | Refills: 5 | Status: SHIPPED | OUTPATIENT
Start: 2020-01-15 | End: 2020-06-17 | Stop reason: SDUPTHER

## 2020-01-21 ENCOUNTER — TELEPHONE (OUTPATIENT)
Dept: FAMILY MEDICINE CLINIC | Age: 40
End: 2020-01-21

## 2020-01-21 ENCOUNTER — OFFICE VISIT (OUTPATIENT)
Dept: FAMILY MEDICINE CLINIC | Age: 40
End: 2020-01-21
Payer: MEDICARE

## 2020-01-21 VITALS
BODY MASS INDEX: 39.72 KG/M2 | RESPIRATION RATE: 16 BRPM | DIASTOLIC BLOOD PRESSURE: 60 MMHG | HEART RATE: 76 BPM | WEIGHT: 202.3 LBS | HEIGHT: 60 IN | SYSTOLIC BLOOD PRESSURE: 92 MMHG

## 2020-01-21 PROBLEM — G44.86 CERVICOGENIC HEADACHE: Status: ACTIVE | Noted: 2017-10-23

## 2020-01-21 PROBLEM — G43.709 CHRONIC MIGRAINE WITHOUT AURA WITHOUT STATUS MIGRAINOSUS, NOT INTRACTABLE: Status: ACTIVE | Noted: 2017-10-23

## 2020-01-21 PROBLEM — M54.81 BILATERAL OCCIPITAL NEURALGIA: Status: ACTIVE | Noted: 2017-10-19

## 2020-01-21 PROCEDURE — 4004F PT TOBACCO SCREEN RCVD TLK: CPT | Performed by: NURSE PRACTITIONER

## 2020-01-21 PROCEDURE — G8417 CALC BMI ABV UP PARAM F/U: HCPCS | Performed by: NURSE PRACTITIONER

## 2020-01-21 PROCEDURE — G8482 FLU IMMUNIZE ORDER/ADMIN: HCPCS | Performed by: NURSE PRACTITIONER

## 2020-01-21 PROCEDURE — G8926 SPIRO NO PERF OR DOC: HCPCS | Performed by: NURSE PRACTITIONER

## 2020-01-21 PROCEDURE — 99214 OFFICE O/P EST MOD 30 MIN: CPT | Performed by: NURSE PRACTITIONER

## 2020-01-21 PROCEDURE — 3023F SPIROM DOC REV: CPT | Performed by: NURSE PRACTITIONER

## 2020-01-21 PROCEDURE — 96160 PT-FOCUSED HLTH RISK ASSMT: CPT | Performed by: NURSE PRACTITIONER

## 2020-01-21 PROCEDURE — G8427 DOCREV CUR MEDS BY ELIG CLIN: HCPCS | Performed by: NURSE PRACTITIONER

## 2020-01-21 RX ORDER — VILAZODONE HYDROCHLORIDE 20 MG/1
20 TABLET ORAL DAILY
Qty: 30 TABLET | Refills: 2 | Status: SHIPPED | OUTPATIENT
Start: 2020-01-21 | End: 2020-03-01 | Stop reason: SDUPTHER

## 2020-01-21 SDOH — ECONOMIC STABILITY: FOOD INSECURITY: WITHIN THE PAST 12 MONTHS, YOU WORRIED THAT YOUR FOOD WOULD RUN OUT BEFORE YOU GOT MONEY TO BUY MORE.: OFTEN TRUE

## 2020-01-21 SDOH — ECONOMIC STABILITY: TRANSPORTATION INSECURITY
IN THE PAST 12 MONTHS, HAS LACK OF TRANSPORTATION KEPT YOU FROM MEETINGS, WORK, OR FROM GETTING THINGS NEEDED FOR DAILY LIVING?: NO

## 2020-01-21 SDOH — ECONOMIC STABILITY: TRANSPORTATION INSECURITY
IN THE PAST 12 MONTHS, HAS THE LACK OF TRANSPORTATION KEPT YOU FROM MEDICAL APPOINTMENTS OR FROM GETTING MEDICATIONS?: NO

## 2020-01-21 SDOH — ECONOMIC STABILITY: INCOME INSECURITY: HOW HARD IS IT FOR YOU TO PAY FOR THE VERY BASICS LIKE FOOD, HOUSING, MEDICAL CARE, AND HEATING?: VERY HARD

## 2020-01-21 SDOH — ECONOMIC STABILITY: FOOD INSECURITY: WITHIN THE PAST 12 MONTHS, THE FOOD YOU BOUGHT JUST DIDN'T LAST AND YOU DIDN'T HAVE MONEY TO GET MORE.: OFTEN TRUE

## 2020-01-21 ASSESSMENT — PATIENT HEALTH QUESTIONNAIRE - PHQ9
8. MOVING OR SPEAKING SO SLOWLY THAT OTHER PEOPLE COULD HAVE NOTICED. OR THE OPPOSITE, BEING SO FIGETY OR RESTLESS THAT YOU HAVE BEEN MOVING AROUND A LOT MORE THAN USUAL: 1
7. TROUBLE CONCENTRATING ON THINGS, SUCH AS READING THE NEWSPAPER OR WATCHING TELEVISION: 3
6. FEELING BAD ABOUT YOURSELF - OR THAT YOU ARE A FAILURE OR HAVE LET YOURSELF OR YOUR FAMILY DOWN: 3
1. LITTLE INTEREST OR PLEASURE IN DOING THINGS: 3
4. FEELING TIRED OR HAVING LITTLE ENERGY: 3
5. POOR APPETITE OR OVEREATING: 3
10. IF YOU CHECKED OFF ANY PROBLEMS, HOW DIFFICULT HAVE THESE PROBLEMS MADE IT FOR YOU TO DO YOUR WORK, TAKE CARE OF THINGS AT HOME, OR GET ALONG WITH OTHER PEOPLE: 0
SUM OF ALL RESPONSES TO PHQ QUESTIONS 1-9: 24
SUM OF ALL RESPONSES TO PHQ QUESTIONS 1-9: 24
9. THOUGHTS THAT YOU WOULD BE BETTER OFF DEAD, OR OF HURTING YOURSELF: 2
SUM OF ALL RESPONSES TO PHQ9 QUESTIONS 1 & 2: 6
3. TROUBLE FALLING OR STAYING ASLEEP: 3
2. FEELING DOWN, DEPRESSED OR HOPELESS: 3

## 2020-01-21 ASSESSMENT — ENCOUNTER SYMPTOMS: SHORTNESS OF BREATH: 0

## 2020-01-21 ASSESSMENT — COLUMBIA-SUICIDE SEVERITY RATING SCALE - C-SSRS
4. HAVE YOU HAD THESE THOUGHTS AND HAD SOME INTENTION OF ACTING ON THEM?: NO
2. HAVE YOU ACTUALLY HAD ANY THOUGHTS OF KILLING YOURSELF?: YES
3. HAVE YOU BEEN THINKING ABOUT HOW YOU MIGHT KILL YOURSELF?: NO
1. WITHIN THE PAST MONTH, HAVE YOU WISHED YOU WERE DEAD OR WISHED YOU COULD GO TO SLEEP AND NOT WAKE UP?: YES
5. HAVE YOU STARTED TO WORK OUT OR WORKED OUT THE DETAILS OF HOW TO KILL YOURSELF? DO YOU INTEND TO CARRY OUT THIS PLAN?: NO
6. HAVE YOU EVER DONE ANYTHING, STARTED TO DO ANYTHING, OR PREPARED TO DO ANYTHING TO END YOUR LIFE?: NO

## 2020-01-21 NOTE — PROGRESS NOTES
Subjective:      Patient ID: Melia Severs is a 46 y.o. female. HPI  : Annual Exam    Chief Complaint   Patient presents with    Annual Exam    Depression       Patient Active Problem List   Diagnosis    Smoking addiction    COPD (chronic obstructive pulmonary disease) ([address]    ANTHONY (generalized anxiety disorder)    Chronic pansinusitis    Bilateral occipital neuralgia    Cervicogenic headache    Chronic migraine without aura without status migrainosus, not intractable       Maya Shannon    Was following with Counselor at [facility] - they have no rescheduled so she has not seen in a while. Depression is her main issue. Mood swings improved with Depakote BID dosing. Still goes to work. Lack of motivation. Overwhelmed. Gloomy personality. Has been on Celexa, Prozac, Zoloft, Lexapro, Effexor, Cymbalta, Zyprexa, Seroquel, Abilify, Risperdal, Lamictal, Latuda and Buspar in past.     Was following with [facility] in [facility] specializing with Migraines. Currently on Depakote 500 BID, Gabapentin 800 QID and Zanaflex 4 mg TID. Neck pain seems to be trigger for her pain. TTP right side upper thoracic/shoulder region. TTP. Has had Botox INJ but had reaction to. Has been through PT. Denies CP, SOB or chest tightness. Continues to smoke. On Symbicort for COPD.      BP wnl    BP Readings from Last 3 Encounters:   01/21/20 92/60   10/16/19 116/78   06/24/19 104/72       Due for screening Labs    No results found for: LABA1C  No results found for: EAG    No components found for: CHLPL  Lab Results   Component Value Date    TRIG 108 07/30/2018    TRIG 94 07/02/2015     Lab Results   Component Value Date    HDL 37 07/30/2018    HDL 39 07/02/2015     Lab Results   Component Value Date    LDLCALC 131 07/30/2018    [address] 110 07/02/2015     No results found for: LABVLDL      Chemistry        Component Value Date/Time     04/19/2019 0008    K 4.4 04/19/2019 0008

## 2020-01-22 NOTE — TELEPHONE ENCOUNTER
Additional PA information faxed to [employer] at [phone] on Denial of Viibryd starter and continuation packs.

## 2020-01-23 NOTE — TELEPHONE ENCOUNTER
Anh kaur received Approval on Viibryd. Coverage 1/23/2020-1/23/2021. auth #: [id number]. Approved for 30 day supply medication are limited to this. [facility] informed spoke with Elizabeth. Patient notified.

## 2020-02-03 ENCOUNTER — TELEPHONE (OUTPATIENT)
Dept: FAMILY MEDICINE CLINIC | Age: 40
End: 2020-02-03

## 2020-02-04 ENCOUNTER — PATIENT MESSAGE (OUTPATIENT)
Dept: FAMILY MEDICINE CLINIC | Age: 40
End: 2020-02-04

## 2020-02-05 RX ORDER — AMOXICILLIN 875 MG/1
875 TABLET, COATED ORAL 2 TIMES DAILY
Qty: 20 TABLET | Refills: 0 | Status: SHIPPED | OUTPATIENT
Start: 2020-02-05 | End: 2020-02-15

## 2020-02-05 NOTE — TELEPHONE ENCOUNTER
From: Mraii Gordon  To: EVA Chatman CNP  Sent: 2/4/2020 5:20 PM EST  Subject: Prescription Question    Another infection.  Can I get amoxicillin to help with it

## 2020-02-10 ENCOUNTER — HOSPITAL ENCOUNTER (OUTPATIENT)
Dept: OCCUPATIONAL THERAPY | Age: 40
Setting detail: THERAPIES SERIES
Discharge: HOME | End: 2020-02-10
Payer: MEDICARE

## 2020-02-10 PROCEDURE — 97165 OT EVAL LOW COMPLEX 30 MIN: CPT

## 2020-02-10 PROCEDURE — 97110 THERAPEUTIC EXERCISES: CPT

## 2020-02-10 RX ORDER — KETOROLAC TROMETHAMINE 10 MG/1
10 TABLET, FILM COATED ORAL EVERY 8 HOURS PRN
Qty: 20 TABLET | Refills: 0 | Status: SHIPPED | OUTPATIENT
Start: 2020-02-10 | End: 2020-03-07 | Stop reason: SDUPTHER

## 2020-02-10 ASSESSMENT — PAIN DESCRIPTION - PAIN TYPE: TYPE: CHRONIC PAIN

## 2020-02-10 ASSESSMENT — PAIN SCALES - GENERAL: PAINLEVEL_OUTOF10: 9

## 2020-02-10 NOTE — PROGRESS NOTES
Insurance Information: [place] [employer] - 30 visits per calendar year, iontophoresis not covered  Total # of Visits to Date: 1  Certification Period Expiration Date: 03/24/20  Progress Note Counter: 1/6  Chart Reviewed: Yes  Patient assessed for rehabilitation services?: Yes    Restrictions/Precautions:  Restrictions/Precautions: General Precautions              Subjective:  Subjective: Patient pleasant and cooperative. She reports she has seen multiple specialists including pain management for this condition. 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 R shoulder depressed compared to L and longer neck compared to L. Hand Dominance: Right                       RUE AROM (degrees)  RUE AROM : Exceptions  R Shoulder Flexion 0-180: 140  R Shoulder ABduction 0-180: 140  R Shoulder Int Rotation  0-70: thumb tip 1\" below bra line  R Shoulder Ext Rotation 0-90: 75       LUE Strength  Gross LUE Strength: WFL                   RUE Strength  Gross RUE Strength: WFL                                                                          ADL  Additional Comments: Patient reports pain with lifting overhead, work tasks, sleeping. Activity Tolerance: Additional Comments: Tolerated evaluation and treatment well.      Assessment:  Performance deficits / Impairments: Decreased ADL status, Decreased high-level IADLs, Decreased ROM,

## 2020-02-19 ENCOUNTER — HOSPITAL ENCOUNTER (OUTPATIENT)
Dept: OCCUPATIONAL THERAPY | Age: 40
Setting detail: THERAPIES SERIES
End: 2020-02-19
Payer: MEDICARE

## 2020-02-24 ENCOUNTER — APPOINTMENT (OUTPATIENT)
Dept: OCCUPATIONAL THERAPY | Age: 40
End: 2020-02-24
Payer: MEDICARE

## 2020-03-09 RX ORDER — KETOROLAC TROMETHAMINE 10 MG/1
10 TABLET, FILM COATED ORAL EVERY 8 HOURS PRN
Qty: 20 TABLET | Refills: 0 | Status: SHIPPED | OUTPATIENT
Start: 2020-03-09 | End: 2020-04-05 | Stop reason: SDUPTHER

## 2020-03-30 ENCOUNTER — PATIENT MESSAGE (OUTPATIENT)
Dept: FAMILY MEDICINE CLINIC | Age: 40
End: 2020-03-30

## 2020-04-01 RX ORDER — AMOXICILLIN 875 MG/1
875 TABLET, COATED ORAL 2 TIMES DAILY
Qty: 20 TABLET | Refills: 0 | Status: SHIPPED | OUTPATIENT
Start: 2020-04-01 | End: 2020-05-01 | Stop reason: SDUPTHER

## 2020-04-06 RX ORDER — KETOROLAC TROMETHAMINE 10 MG/1
10 TABLET, FILM COATED ORAL EVERY 8 HOURS PRN
Qty: 20 TABLET | Refills: 0 | Status: SHIPPED | OUTPATIENT
Start: 2020-04-06 | End: 2020-05-05 | Stop reason: SDUPTHER

## 2020-04-30 ENCOUNTER — PATIENT MESSAGE (OUTPATIENT)
Dept: FAMILY MEDICINE CLINIC | Age: 40
End: 2020-04-30

## 2020-05-01 RX ORDER — AMOXICILLIN 875 MG/1
875 TABLET, COATED ORAL 2 TIMES DAILY
Qty: 20 TABLET | Refills: 0 | Status: SHIPPED | OUTPATIENT
Start: 2020-05-01 | End: 2020-05-11

## 2020-05-05 RX ORDER — KETOROLAC TROMETHAMINE 10 MG/1
10 TABLET, FILM COATED ORAL EVERY 8 HOURS PRN
Qty: 20 TABLET | Refills: 0 | Status: SHIPPED | OUTPATIENT
Start: 2020-05-05 | End: 2020-06-09 | Stop reason: SDUPTHER

## 2020-05-05 RX ORDER — TIZANIDINE 4 MG/1
4 TABLET ORAL EVERY 8 HOURS PRN
Qty: 90 TABLET | Refills: 5 | Status: SHIPPED | OUTPATIENT
Start: 2020-05-05 | End: 2020-06-17 | Stop reason: SDUPTHER

## 2020-05-05 RX ORDER — SODIUM CHLORIDE/ALOE VERA
GEL (GRAM) NASAL PRN
Qty: 1 TUBE | Refills: 3 | Status: SHIPPED | OUTPATIENT
Start: 2020-05-05 | End: 2020-12-05 | Stop reason: SDUPTHER

## 2020-05-13 ENCOUNTER — TELEMEDICINE (OUTPATIENT)
Dept: FAMILY MEDICINE CLINIC | Age: 40
End: 2020-05-13
Payer: MEDICARE

## 2020-05-13 PROCEDURE — G8428 CUR MEDS NOT DOCUMENT: HCPCS | Performed by: NURSE PRACTITIONER

## 2020-05-13 PROCEDURE — 99213 OFFICE O/P EST LOW 20 MIN: CPT | Performed by: NURSE PRACTITIONER

## 2020-05-13 RX ORDER — QUETIAPINE FUMARATE 50 MG/1
50 TABLET, FILM COATED ORAL NIGHTLY
Qty: 30 TABLET | Refills: 1 | Status: SHIPPED | OUTPATIENT
Start: 2020-05-13 | End: 2020-06-09 | Stop reason: SDUPTHER

## 2020-05-13 RX ORDER — VILAZODONE HYDROCHLORIDE 10 MG/1
10 TABLET ORAL DAILY
Qty: 30 TABLET | Refills: 0 | Status: SHIPPED | OUTPATIENT
Start: 2020-05-13 | End: 2020-10-15

## 2020-05-13 ASSESSMENT — ENCOUNTER SYMPTOMS
SHORTNESS OF BREATH: 0
ABDOMINAL PAIN: 0
COUGH: 0
NAUSEA: 0

## 2020-05-26 ENCOUNTER — APPOINTMENT (OUTPATIENT)
Dept: GENERAL RADIOLOGY | Age: 40
End: 2020-05-26
Payer: MEDICARE

## 2020-05-26 ENCOUNTER — E-VISIT (OUTPATIENT)
Dept: FAMILY MEDICINE CLINIC | Age: 40
End: 2020-05-26
Payer: MEDICARE

## 2020-05-26 ENCOUNTER — HOSPITAL ENCOUNTER (EMERGENCY)
Age: 40
Discharge: HOME | End: 2020-05-26
Payer: MEDICARE

## 2020-05-26 VITALS
TEMPERATURE: 98.3 F | BODY MASS INDEX: 34.36 KG/M2 | OXYGEN SATURATION: 96 % | SYSTOLIC BLOOD PRESSURE: 117 MMHG | DIASTOLIC BLOOD PRESSURE: 86 MMHG | HEIGHT: 60 IN | HEART RATE: 88 BPM | RESPIRATION RATE: 16 BRPM | WEIGHT: 175 LBS

## 2020-05-26 PROCEDURE — 99213 OFFICE O/P EST LOW 20 MIN: CPT | Performed by: NURSE PRACTITIONER

## 2020-05-26 PROCEDURE — 99213 OFFICE O/P EST LOW 20 MIN: CPT

## 2020-05-26 PROCEDURE — 73610 X-RAY EXAM OF ANKLE: CPT

## 2020-05-26 PROCEDURE — 99421 OL DIG E/M SVC 5-10 MIN: CPT | Performed by: NURSE PRACTITIONER

## 2020-05-26 RX ORDER — AMOXICILLIN 875 MG/1
875 TABLET, COATED ORAL 2 TIMES DAILY
Qty: 20 TABLET | Refills: 0 | Status: SHIPPED | OUTPATIENT
Start: 2020-05-26 | End: 2020-09-18 | Stop reason: SDUPTHER

## 2020-05-26 RX ORDER — NAPROXEN 500 MG/1
500 TABLET ORAL 2 TIMES DAILY WITH MEALS
Qty: 20 TABLET | Refills: 0 | Status: SHIPPED | OUTPATIENT
Start: 2020-05-26 | End: 2020-06-01 | Stop reason: SDUPTHER

## 2020-05-26 ASSESSMENT — PAIN DESCRIPTION - ONSET: ONSET: GRADUAL

## 2020-05-26 ASSESSMENT — PAIN DESCRIPTION - ORIENTATION: ORIENTATION: RIGHT

## 2020-05-26 ASSESSMENT — PAIN SCALES - GENERAL: PAINLEVEL_OUTOF10: 10

## 2020-05-26 ASSESSMENT — PAIN DESCRIPTION - FREQUENCY: FREQUENCY: CONTINUOUS

## 2020-05-26 ASSESSMENT — PAIN - FUNCTIONAL ASSESSMENT: PAIN_FUNCTIONAL_ASSESSMENT: PREVENTS OR INTERFERES SOME ACTIVE ACTIVITIES AND ADLS

## 2020-05-26 ASSESSMENT — PAIN DESCRIPTION - DESCRIPTORS: DESCRIPTORS: ACHING;STABBING;SORE

## 2020-05-26 ASSESSMENT — PAIN DESCRIPTION - PROGRESSION: CLINICAL_PROGRESSION: NOT CHANGED

## 2020-05-26 ASSESSMENT — ENCOUNTER SYMPTOMS
NAUSEA: 0
SHORTNESS OF BREATH: 0
DIARRHEA: 0
VOMITING: 0
ABDOMINAL PAIN: 0
COUGH: 0

## 2020-05-26 ASSESSMENT — PAIN DESCRIPTION - PAIN TYPE: TYPE: ACUTE PAIN

## 2020-05-26 ASSESSMENT — PAIN DESCRIPTION - LOCATION: LOCATION: ANKLE

## 2020-05-26 NOTE — ED TRIAGE NOTES
Pt to [facility] ambulatory with right ankle pain. Pt slipped and fell yesterday at home. Right ankle is swollen and red. Pt has applied ice and elevation to right ankle prior to arrival here today.

## 2020-05-26 NOTE — ED PROVIDER NOTES
[address]       Chief Complaint   Patient presents with    Ankle Pain     Right       Nurses Notes reviewed and I agree except as noted in the HPI. HISTORY OF PRESENT ILLNESS   Hina Salgado is a 45 y.o. female who presents for evaluation of right ankle pain that developed yesterday after she slopped and fell at home. Patient states that she was walking down wooden steps in flip flops and fell down about four stairs, twisting her right ankle. Patient states that her ankle has been swollen so she has applied ice to the area and has elevated the ankle. Patient/patient representative denies any foreign or domestic travel in the last 30 days. Patient /patient representative denies exposure to those with similar symptoms. Patient/patient representative denies contact with someone who was confirmed or suspected to have Coronavirus / COVID-19 within the last month. REVIEW OF SYSTEMS     Review of Systems   Constitutional: Negative for chills and fever. Respiratory: Negative for cough and shortness of breath. Cardiovascular: Negative for chest pain. Gastrointestinal: Negative for abdominal pain, diarrhea, nausea and vomiting. Musculoskeletal: Positive for arthralgias (right ankle). Skin: Negative for rash. Allergic/Immunologic: Negative for environmental allergies and food allergies. Neurological: Negative for headaches. PAST MEDICAL HISTORY         Diagnosis Date    Anxiety     Asthma     COPD (chronic obstructive pulmonary disease) ([facility] [address]     Depression     Migraines        SURGICAL HISTORY     Patient  has a past surgical history that includes Tonsillectomy and adenoidectomy (1989); Ovary removal (2000); Cholecystectomy (2003); Tubal ligation (2005); Hysterectomy (2011); Dilation and curettage of uterus (2009); IGS Endo Sinus Sx (07/12/2017); and Tooth Extraction.     CURRENT MEDICATIONS       Discharge COMPRESSOR) KIT ONCE Starting u 9/5/2019, For 1 dose, Disp-1 kit, R-0, Print             ALLERGIES     Patient is is allergic to botox [onabotulinumtoxina (cosmetic)]; other; topamax [topiramate]; acetaminophen; aspirin; and ibuprofen. FAMILY HISTORY     Patient'sfamily history includes Cancer in her father; Depression in her father and mother; Heart Disease in her father, maternal grandfather, and paternal grandfather; Migraines in her father and mother. SOCIAL HISTORY     Patient  reports that she has been smoking cigarettes. She has been smoking about 0.50 packs per day. She has never used smokeless tobacco. She reports that she does not drink alcohol or use drugs. PHYSICAL EXAM     ED TRIAGE VITALS  BP: 117/86, Temp: 98.3 °F (36.8 °C), Pulse: 88, Resp: 16, SpO2: 96 %  Physical Exam  Vitals signs and nursing note reviewed. Constitutional:       General: She is not in acute distress. Appearance: Normal appearance. She is well-developed and well-groomed. HENT:      Head: Normocephalic and atraumatic. Right Ear: External ear normal.      Left Ear: External ear normal.      Mouth/Throat:      Lips: Pink. Eyes:      Conjunctiva/sclera: Conjunctivae normal.      Right eye: Right conjunctiva is not injected. Left eye: Left conjunctiva is not injected. Pupils: Pupils are equal.   Neck:      Musculoskeletal: Normal range of motion. No spinous process tenderness. Cardiovascular:      Rate and Rhythm: Normal rate. Pulmonary:      Effort: Pulmonary effort is normal.   Musculoskeletal:      Right ankle: She exhibits decreased range of motion (due to pain). Tenderness. Lateral malleolus and medial malleolus tenderness found. Feet:    Skin:     General: Skin is warm and dry. Findings: No rash (on exposed surfaces). Neurological:      Mental Status: She is alert and oriented to person, place, and time. Psychiatric:         Mood and Affect: Affect is flat.          Speech: Speech normal.         Behavior: Behavior is slowed. Behavior is cooperative. DIAGNOSTIC RESULTS   Labs:No results found for this visit on 05/26/20. IMAGING:  XR ANKLE RIGHT (MIN 3 VIEWS)   Final Result   No evidence of acute osseous injury of the right ankle. **This report has been created using voice recognition software. It may contain minor errors which are inherent in voice recognition technology. **      Final report electronically signed by Dr. Johnny Lam MD on 5/26/2020 9:54 AM        URGENT CARE COURSE:     Vitals:    05/26/20 0929   BP: 117/86   Pulse: 88   Resp: 16   Temp: 98.3 °F (36.8 °C)   TempSrc: Temporal   SpO2: 96%   Weight: 175 lb (79.4 kg)   Height: 5' (1.524 m)       Medications - No data to display  PROCEDURES:  FINALIMPRESSION      1. Sprain of right ankle, unspecified ligament, initial encounter        DISPOSITION/PLAN   DISPOSITION    Discharge  Patient states that she tolerates naproxen. Follow-up appointment at [facility] scheduled for further evaluation and management. Physical assessment findings, diagnostic testing(s) if applicable, and vital signs reviewed with patient/patient representative. Questions answered. If applicable, patient/patient representative will be contacted upon receipt of final culture and sensitivity or other testing results when available. Any additions or changes to medications or changes the plan of care will be made at that time. Medications as directed, including OTC medications for supportive care. Education provided on medications. Differential diagnosis(s) discussed with patient/patient representative. Home care/self care instructions reviewed with patient/patient representative. Patient is to follow-up with family care provider in 2-3 days if no improvement. Patient is to go to the emergency department if symptoms worsen.   Patient/patient representative is aware of care plan, questions answered, verbalizes understanding and is in agreement. Teach back method used for patient/patient representative teaching(s) and printed instructions attached to after visit summary. ED Course as of May 26 1009   Tue May 26, 2020   1008 XR ANKLE RIGHT (MIN 3 VIEWS) [HA]      ED Course User Index  [ALAMO] EVA Matthews CNP       Problem List Items Addressed This Visit     None      Visit Diagnoses     Sprain of right ankle, unspecified ligament, initial encounter    -  Primary    Relevant Medications    naproxen (NAPROSYN) 500 MG tablet          PATIENT REFERRED TO:  EVA Sterling CNP  [facility] [address]  [address] [place] [address] [facility]    Schedule an appointment as soon as possible for a visit in 3 days  For further evaluation. , If symptoms change/worsen, go to the [facility]  [address] [facility]  [phone]    as needed, If symptoms change/worsen, go to the [address] [facility] [place], [address] Mansi Clemons APRN - BACILIO  05/26/20 1006

## 2020-05-27 ENCOUNTER — CARE COORDINATION (OUTPATIENT)
Dept: CARE COORDINATION | Age: 40
End: 2020-05-27

## 2020-05-27 NOTE — CARE COORDINATION
Attempt to contact patient today regarding ED follow up, COVID -19 Monitoring. Unable to reach patient. Left voicemail message      Recent encounters and notes reviewed.     Future Appointments   Date Time Provider Raman Elmore   5/27/2020 12:00 PM SHEKHAR Browning [address] [facility] RN Care Manager  [phone]

## 2020-05-28 ENCOUNTER — CARE COORDINATION (OUTPATIENT)
Dept: CARE COORDINATION | Age: 40
End: 2020-05-28

## 2020-06-01 ENCOUNTER — PATIENT MESSAGE (OUTPATIENT)
Dept: FAMILY MEDICINE CLINIC | Age: 40
End: 2020-06-01

## 2020-06-01 RX ORDER — NAPROXEN 500 MG/1
500 TABLET ORAL 2 TIMES DAILY WITH MEALS
Qty: 20 TABLET | Refills: 0 | Status: SHIPPED | OUTPATIENT
Start: 2020-06-01 | End: 2020-09-28 | Stop reason: SDUPTHER

## 2020-06-05 ENCOUNTER — VIRTUAL VISIT (OUTPATIENT)
Dept: BEHAVIORAL/MENTAL HEALTH CLINIC | Age: 40
End: 2020-06-05
Payer: MEDICARE

## 2020-06-05 PROCEDURE — 90791 PSYCH DIAGNOSTIC EVALUATION: CPT | Performed by: SOCIAL WORKER

## 2020-06-05 ASSESSMENT — PATIENT HEALTH QUESTIONNAIRE - PHQ9
1. LITTLE INTEREST OR PLEASURE IN DOING THINGS: 3
9. THOUGHTS THAT YOU WOULD BE BETTER OFF DEAD, OR OF HURTING YOURSELF: 0
SUM OF ALL RESPONSES TO PHQ9 QUESTIONS 1 & 2: 6
7. TROUBLE CONCENTRATING ON THINGS, SUCH AS READING THE NEWSPAPER OR WATCHING TELEVISION: 3
SUM OF ALL RESPONSES TO PHQ QUESTIONS 1-9: 24
SUM OF ALL RESPONSES TO PHQ QUESTIONS 1-9: 24
5. POOR APPETITE OR OVEREATING: 3
4. FEELING TIRED OR HAVING LITTLE ENERGY: 3
6. FEELING BAD ABOUT YOURSELF - OR THAT YOU ARE A FAILURE OR HAVE LET YOURSELF OR YOUR FAMILY DOWN: 3
8. MOVING OR SPEAKING SO SLOWLY THAT OTHER PEOPLE COULD HAVE NOTICED. OR THE OPPOSITE, BEING SO FIGETY OR RESTLESS THAT YOU HAVE BEEN MOVING AROUND A LOT MORE THAN USUAL: 3
3. TROUBLE FALLING OR STAYING ASLEEP: 3
2. FEELING DOWN, DEPRESSED OR HOPELESS: 3
10. IF YOU CHECKED OFF ANY PROBLEMS, HOW DIFFICULT HAVE THESE PROBLEMS MADE IT FOR YOU TO DO YOUR WORK, TAKE CARE OF THINGS AT HOME, OR GET ALONG WITH OTHER PEOPLE: 2

## 2020-06-05 ASSESSMENT — COLUMBIA-SUICIDE SEVERITY RATING SCALE - C-SSRS
2. HAVE YOU ACTUALLY HAD ANY THOUGHTS OF KILLING YOURSELF?: NO
1. WITHIN THE PAST MONTH, HAVE YOU WISHED YOU WERE DEAD OR WISHED YOU COULD GO TO SLEEP AND NOT WAKE UP?: NO
6. HAVE YOU EVER DONE ANYTHING, STARTED TO DO ANYTHING, OR PREPARED TO DO ANYTHING TO END YOUR LIFE?: NO

## 2020-06-05 NOTE — PATIENT INSTRUCTIONS
1. Depression: Facts, Myths & Tips for Feeling Better    Facts    Depression is very common  Nearly 20% of the [place] population experiences a significant depression during their lifetime. Depression is treatable  Because depression affects so many, and can have such a powerful and negative impact on life, there has been an enormous amount of research conducted on how to reduce symptoms and improve functioning. As a result, we now know there are behaviors YOU can engage in to make yourself feel better. Depression is not a weakness  People suffer from depression for a variety of reasons, biological, environmental and behavioral.  Research indicates that [employer] is NOT one of the reasons people become depressed. Depression is not something you are powerless against  Evidence suggests that you can directly impact the intensity and duration of depression by what you do and by altering the way you think about certain things. The Downward Spiral    Depression often begins as a drop in mood due to an environmental or biological trigger that makes people feel less like being active. Being less active, in turn, often causes people to experience an even lower mood and feel even less like being active, and so the cycle begins. How can I start feeling better? The first and best way to reverse the downward cycle is to get active! Your body produces its own anti-depressants every time you exercise or do something pleasurable. Regular exercise is one of the very best ways to improve your mood. In fact some studies have shown that a solid exercise program is as effective as psychotherapy or anti-depressant medication for some people. *See physical activity section of handout    Force yourself to do something you found pleasurable before depression. This may be different for everyone and it doesnt matter if its gardening, playing bridge, walking, reading a novel, or simply talking to a close friend. living your life well again. Recommended readings on managing depression    - Feeling Good by Dr. Damaso Reich. Rinaldi     - Mind over Liat-Cem by Moe Herbert and [facility] [place] by Dr. Vanessa Angel by Dr. Tonya Casas. Sapolsky      Disputation:  Challenging Upsetting Thinking    Examine your thoughts for key words:  1. must, need, got to, have to, should (unrealistic standards)  2. never, always, completely, totally, all everything, everyone (predictions /  labeling)  3. awful, terrible, horrible, unbearable, disaster, worst ever (labeling / predictions)  4. jerk, slob, creep, hypocrite, bully, stupid (labels)  Dispute or question the accuracy of the questionable thoughts. 1. Am I upsetting myself unnecessarily? How can I see this another way? 2. Is my thinking working for or against me? How could I view this in a less upsetting way? 3. What am I demanding must happen? What do I want or prefer, rather than need? 4. Am I making something too terrible? Is it really that awful? What would be so terrible about that? 5. Am I labeling a person? What is the action that I dont like? 6. Whats untrue about my thoughts? How can I stick to the facts? Whats the proof for what I am thinking or believing about this? 7. Am I using extreme, black-and-white language? What less extreme words might be more accurate? 8. Am I fortune telling or mind reading in a way that gets me upset or unhappy? What are the odds (percent chance -- e.g., there is a 5% chance. ..) that it will really turn out the way Im thinking or imagining? 9. What are my options in this situation? How would I like to respond? 10. Create more moderate, helpful, or realistic statements to replace the upsetting ones. 11. Have I had any experiences that show that this thought might not be totally true? 12. If my best friend or someone I loved had this thought, what would I tell them?   13. If my best friend or someone I loved knew I was thinking this thought, what would they say to me? What evidence would they point out to me that would suggest that my thought is not completely true? 14. Are there strengths in me or positives in the situation that I am ignoring? Am I underestimating my ability to cope with unfortunate circumstances? 15. When I am not feeling this way, do I think about this situation any differently? How?  16. Have I been in this type of situation before? What happened? What have I learned from prior experiences that could help me now? 17. Five years from now, if I look back on this situation, will I look at it any differently? Will I focus on any different part of my experience? 25. Am I blaming myself for something over which I do not have complete control? 2. The Stress Response and How It Can Affect You   The stress response, or fight or flight response is the emergency reaction system of the body. It is there to keep you safe in emergencies. The stress response includes physical and thought responses to your perception of various situations. When the stress response is turned on, your body may release substances like adrenaline and cortisol. Your organs are programmed to respond in certain ways to situations that are viewed as challenging or threatening. The stress response can work against you. You can turn it on when you dont really need it and, as a result, perceive something as an emergency when its really not. It can turn on when you are just thinking about past or future events. Harmless, chronic conditions can be intensified by the stress response activating too often, with too much intensity, or for too long. Stress responses can be different for different individuals. Below is a list of some common stress related responses people have.  ([place] the responses you have had in the last 2 weeks.) breaths. Then let the breath come naturally without trying to influence it. Ideally it will be quiet and slow, but depth and rhythm may vary.  To begin the exercise, count \"one\" to yourself as you exhale.  The next time you exhale, count \"two,\" and so on up to [place]. \"   Then begin a new cycle, counting \"one\" on the next exhalation. Never count higher than \"five,\" and count only when you exhale. You will know your attention has wandered when you find yourself up to \"eight,\" \"12,\" even \"19. \"  Try to do 10 minutes of this form of meditation. 4. Pt will promote effective self care habits daily-(sleep, positive activities, relaxation)  5. Pt will follow up with PRISCILA Whittington next week.

## 2020-06-12 ENCOUNTER — VIRTUAL VISIT (OUTPATIENT)
Dept: BEHAVIORAL/MENTAL HEALTH CLINIC | Age: 40
End: 2020-06-12
Payer: MEDICARE

## 2020-06-12 PROCEDURE — 90834 PSYTX W PT 45 MINUTES: CPT | Performed by: SOCIAL WORKER

## 2020-06-12 NOTE — PROGRESS NOTES
Behavioral Health Consultation  PRISCILA Yadav  6/12/2020  10:30 AM EDT      Time spent with Patient: 39 minutes  This is patient's second  [facility] appointment. Reason for Consult:    Chief Complaint   Patient presents with    Anxiety    Depression     Referring Provider: ABEBE Clemente  Pt provided informed consent for the behavioral health program. Discussed with patient model of service to include the limits of confidentiality (i.e. abuse reporting, suicide intervention, etc.) and short-term intervention focused approach. Pt indicated understanding. Feedback given to PCP. [facility]  [facility]  Dept: [phone]  Dept Fax: [phone]    TELEHEALTH EVALUATION -- Audio/Visual (During BDYWB-77 public health emergency)  This visit was performed via a synchronous telecommunication system. Pt location: [facility]  Provider location: [facility] ([address] [place] [address])  Pt notified that this was a virtual visit and that we will submit a claim for reimbursement to their insurance company. They were made aware that any copays, coinsurance amounts, or other amounts not covered will be their responsibility. Pt accepted?  yes  1  Pursuant to the emergency declaration under the [facility], 1135 waiver authority and the [employer] General Act, this Virtual  Visit was conducted, with patient's consent, to reduce the patient's risk of exposure to COVID-19 and provide continuity of care for an established patient. Services were provided through a video synchronous discussion virtually to substitute for in-person clinic visit. S:  Pt assessed symptoms of anxiety and depression have progressively improved. She indicated several coping skills of deep breathing and use of distraction strategies have been of benefit to her.  Pt acknowledged being intentional about

## 2020-06-16 ENCOUNTER — PATIENT MESSAGE (OUTPATIENT)
Dept: FAMILY MEDICINE CLINIC | Age: 40
End: 2020-06-16

## 2020-06-17 RX ORDER — GABAPENTIN 800 MG/1
800 TABLET ORAL 4 TIMES DAILY
Qty: 120 TABLET | Refills: 5 | Status: SHIPPED | OUTPATIENT
Start: 2020-06-17 | End: 2020-12-06 | Stop reason: SDUPTHER

## 2020-06-17 RX ORDER — TIZANIDINE 4 MG/1
4 TABLET ORAL EVERY 8 HOURS PRN
Qty: 90 TABLET | Refills: 5 | Status: SHIPPED | OUTPATIENT
Start: 2020-06-17 | End: 2020-09-03 | Stop reason: SDUPTHER

## 2020-06-26 ENCOUNTER — PROCEDURE VISIT (OUTPATIENT)
Dept: NEUROLOGY | Age: 40
End: 2020-06-26
Payer: MEDICARE

## 2020-06-26 PROCEDURE — 95886 MUSC TEST DONE W/N TEST COMP: CPT | Performed by: PSYCHIATRY & NEUROLOGY

## 2020-06-26 PROCEDURE — 95911 NRV CNDJ TEST 9-10 STUDIES: CPT | Performed by: PSYCHIATRY & NEUROLOGY

## 2020-07-06 ENCOUNTER — VIRTUAL VISIT (OUTPATIENT)
Dept: BEHAVIORAL/MENTAL HEALTH CLINIC | Age: 40
End: 2020-07-06
Payer: MEDICARE

## 2020-07-06 PROCEDURE — 4004F PT TOBACCO SCREEN RCVD TLK: CPT | Performed by: SOCIAL WORKER

## 2020-07-06 PROCEDURE — 90832 PSYTX W PT 30 MINUTES: CPT | Performed by: SOCIAL WORKER

## 2020-07-06 NOTE — Clinical Note
No further appointments will be scheduled. Pt will contact the office to schedule an appointment if needed.

## 2020-07-06 NOTE — PROGRESS NOTES
Behavioral Health Consultation  PRISCILA Bruno  7/6/2020  8:00 AM EDT      Time spent with Patient: 30 minutes  This is patient's third  [facility] appointment. Reason for Consult:    Chief Complaint   Patient presents with    Depression    Anxiety     Referring Provider: ABEBE Jain  Pt provided informed consent for the behavioral health program. Discussed with patient model of service to include the limits of confidentiality (i.e. abuse reporting, suicide intervention, etc.) and short-term intervention focused approach. Pt indicated understanding. Feedback given to PCP. [facility]  [facility]  Dept: [phone]  Dept Fax: [phone]    TELEHEALTH EVALUATION -- Audio/Visual (During YDMVN-18 public health emergency)  This visit was performed via a synchronous telecommunication system. Pt location: [facility]  Provider location: [facility] ([address] [place] [address])  Pt notified that this was a virtual visit and that we will submit a claim for reimbursement to their insurance company. They were made aware that any copays, coinsurance amounts, or other amounts not covered will be their responsibility. Pt accepted?  yes  1  Pursuant to the emergency declaration under the [facility], 1135 waiver authority and the [employer] General Act, this Virtual  Visit was conducted, with patient's consent, to reduce the patient's risk of exposure to COVID-19 and provide continuity of care for an established patient. Services were provided through a video synchronous discussion virtually to substitute for in-person clinic visit. S:  Pt assessed symptoms of anxiety and depression have improved with her medication change helping her to get more sleep.  She reports having become more aware of increased stress/anxiety features and is applying coping skills and self care as needed. Pt acknowledged worries related to awaiting test results and accepted some validation on her experience. She observes times her process of this creates some hopefulness that she will know how to best manage her medical needs. Pt was guided in processing her balancing of thoughts and response to each. Pt was advised of indications of depressive symptoms and instructed to monitor if symptoms worsen or interfere with daily functioning, to consider following-up with either your primary care team or a behavioral health provider for possible counseling or medication management. If suicidal thoughts are experienced, call 911. An additional 24/7 resource is the [address] at [phone] ([phone]). No further appointments will be scheduled. Pt will contact the office to schedule an appointment if needed. O:  MSE:    Appearance    mild distress  Appetite normal  Sleep disturbance Yes  Fatigue Yes  Loss of pleasure Yes  Impulsive behavior No  Speech    normal rate  Mood    Depressed  euthymic   Affect    normal affect  Thought Content    intact  Thought Process    coherent  Associations    logical connections  Insight    Fair  Judgment    Intact  Orientation    oriented to person, place, time, and general circumstances  Memory    recent and remote memory intact  Attention/Concentration    intact  Morbid ideation No  Suicide Assessment    no suicidal ideation    History:    TOBACCO:   reports that she has been smoking cigarettes. She has been smoking about 0.50 packs per day. She has never used smokeless tobacco.  ETOH:   reports no history of alcohol use.   Family History:   Family History   Problem Relation Age of Onset    Depression Mother    Maureen Langton Migraines Mother     Cancer Father         Prostate and Testicular Cancer    Depression Father     Heart Disease Father     Migraines Father     Heart Disease Maternal Grandfather     Heart Disease Paternal Grandfather        A: Diagnosis:    1. ANTHONY (generalized anxiety disorder)    2. Severe episode of recurrent major depressive disorder, without psychotic features ([facility]          Diagnosis Date    Anxiety     Asthma     COPD (chronic obstructive pulmonary disease) ([facility]     Depression     Migraines        Plan: No further appointments will be scheduled. Pt will contact the office to schedule an appointment if needed. Pt interventions:  Provided handout on  depression, anxiety and deep breathing, Trained in relaxation strategies and Discussed self-care (sleep, nutrition, rewarding activities, social support, exercise)    Pt Behavioral Change Plan:     1. Pt will practice deep breathing exercises 2-3x's daily for 3-5 minutes. 2. Pt will familiarize self with charlie [employer]. 3. Pt will promote effective self care management daily-sleep, increased activities, supportive relationships, relaxation. 4. No further appointments will be scheduled. Pt will contact the office to schedule an appointment if needed.

## 2020-07-06 NOTE — PATIENT INSTRUCTIONS
1. Depression: Facts, Myths & Tips for Feeling Better    Facts    Depression is very common  Nearly 20% of the [place] population experiences a significant depression during their lifetime. Depression is treatable  Because depression affects so many, and can have such a powerful and negative impact on life, there has been an enormous amount of research conducted on how to reduce symptoms and improve functioning. As a result, we now know there are behaviors YOU can engage in to make yourself feel better. Depression is not a weakness  People suffer from depression for a variety of reasons, biological, environmental and behavioral.  Research indicates that [employer] is NOT one of the reasons people become depressed. Depression is not something you are powerless against  Evidence suggests that you can directly impact the intensity and duration of depression by what you do and by altering the way you think about certain things. The Downward Spiral    Depression often begins as a drop in mood due to an environmental or biological trigger that makes people feel less like being active. Being less active, in turn, often causes people to experience an even lower mood and feel even less like being active, and so the cycle begins. How can I start feeling better? The first and best way to reverse the downward cycle is to get active! Your body produces its own anti-depressants every time you exercise or do something pleasurable. Regular exercise is one of the very best ways to improve your mood. In fact some studies have shown that a solid exercise program is as effective as psychotherapy or anti-depressant medication for some people. *See physical activity section of handout    Force yourself to do something you found pleasurable before depression. This may be different for everyone and it doesnt matter if its gardening, playing bridge, walking, reading a novel, or simply talking to a close friend. What matters is that YOU find the activity pleasurable! Even if you dont feel like doing something pleasurable for yourself, DO IT ANYWAY. We call this the fake it until you make it principle. Educate yourself! Often people feel powerless against medical conditions because they do not understand what is happening in their body. Just by reading this handout you know more than most people about depression. Knowledge is power when you can apply it, and make yourself feel better. *See recommended reading list at the bottom of this handout\"    Begin to notice unhealthy and unhelpful thoughts! In addition to how we behave, how we think influences our mood directly. Notice recurrent or alarming thoughts that have an impact on your mood. Ask yourself is this type of thinking helping me or hurting me?  if your answer is it's hurting me here are some things you can do: *see disputation of negative thoughts section of handout  - Challenge the negative thought. Is it truly accurate? Wheres the proof? Become your own  and collect the facts.  - Reframe the negative thought. How can I think differently about this problem, situation, or view of myself? Allow yourself to view a situation from more than one angle, how might my spouse, friend, or someone I admire view this same problem?  - Use the best friend scenario. How would you help your best friend if he or she was having these same thoughts? Would you criticize him or her as harshly as you criticize yourself? Remember, YOU know YOU better than anyone else. You likely know what kinds of activities, thoughts and reinforcement you respond to. Doing whats easiest and most doable is the key. Pick 1 or 2 things that are easy and get started feeling better TODAY!     * Use the following handout sections to guide you through behavioral and thinking exercises to help you manage your depressive symptoms, improve your functioning and to begin living your life well again. Recommended readings on managing depression    - Feeling Good by Dr. Corby Antunez. Rinaldi     - Mind over Liat-Cem by Tru Contreras and [facility] [place] by Dr. Chavarria Orf by Dr. Amarjit Heard. Sapolsky      Disputation:  Challenging Upsetting Thinking    Examine your thoughts for key words:  1. must, need, got to, have to, should (unrealistic standards)  2. never, always, completely, totally, all everything, everyone (predictions /  labeling)  3. awful, terrible, horrible, unbearable, disaster, worst ever (labeling / predictions)  4. jerk, slob, creep, hypocrite, bully, stupid (labels)  Dispute or question the accuracy of the questionable thoughts. 1. Am I upsetting myself unnecessarily? How can I see this another way? 2. Is my thinking working for or against me? How could I view this in a less upsetting way? 3. What am I demanding must happen? What do I want or prefer, rather than need? 4. Am I making something too terrible? Is it really that awful? What would be so terrible about that? 5. Am I labeling a person? What is the action that I dont like? 6. Whats untrue about my thoughts? How can I stick to the facts? Whats the proof for what I am thinking or believing about this? 7. Am I using extreme, black-and-white language? What less extreme words might be more accurate? 8. Am I fortune telling or mind reading in a way that gets me upset or unhappy? What are the odds (percent chance -- e.g., there is a 5% chance. ..) that it will really turn out the way Im thinking or imagining? 9. What are my options in this situation? How would I like to respond? 10. Create more moderate, helpful, or realistic statements to replace the upsetting ones. 11. Have I had any experiences that show that this thought might not be totally true? 12. If my best friend or someone I loved had this thought, what would I tell them?   13. If my best friend or someone I loved knew I was thinking this thought, what would they say to me? What evidence would they point out to me that would suggest that my thought is not completely true? 14. Are there strengths in me or positives in the situation that I am ignoring? Am I underestimating my ability to cope with unfortunate circumstances? 15. When I am not feeling this way, do I think about this situation any differently? How?  16. Have I been in this type of situation before? What happened? What have I learned from prior experiences that could help me now? 17. Five years from now, if I look back on this situation, will I look at it any differently? Will I focus on any different part of my experience? 25. Am I blaming myself for something over which I do not have complete control? 2. The Stress Response and How It Can Affect You   The stress response, or fight or flight response is the emergency reaction system of the body. It is there to keep you safe in emergencies. The stress response includes physical and thought responses to your perception of various situations. When the stress response is turned on, your body may release substances like adrenaline and cortisol. Your organs are programmed to respond in certain ways to situations that are viewed as challenging or threatening. The stress response can work against you. You can turn it on when you dont really need it and, as a result, perceive something as an emergency when its really not. It can turn on when you are just thinking about past or future events. Harmless, chronic conditions can be intensified by the stress response activating too often, with too much intensity, or for too long. Stress responses can be different for different individuals. Below is a list of some common stress related responses people have.  ([place] the responses you have had in the last 2 weeks.) Learning to relax your body, through specific breathing and relaxation exercises as well as by minimizing stressful thinking, can help your bodys natural relaxation system be more effective. 3.   Deep Breathing Exercises ([facility] [employer])      Exercise 1:  The Stimulating Breath (also called the Jeff Breath)   Its aim is to raise energy level and increase alertness. - Inhale and exhale rapidly through your nose, keeping your mouth closed but relaxed. Your breaths in and out should be equal in duration, but as short as possible. This is a noisy breathing exercise. - Try for three in-and-out breath cycles per second. This produces a quick movement of the diaphragm, suggesting a jeff. Breathe normally after each cycle. - Do not do for more than 15 seconds on your first try. Each time you practice the Stimulating Breath, you can increase your time by five seconds or so, until you reach a full minute. If done properly, you may feel invigorated, comparable to the heightened awareness you feel after a good workout. You should feel the effort at the back of the neck, the diaphragm, the chest and the abdomen. Try this breathing exercise the next time you need an energy boost and feel yourself reaching for a cup of coffee. Exercise 2:  The 4-7-8 (or Relaxing Breath) Exercise  This exercise is utterly simple, takes almost no time, requires no equipment and can be done anywhere. Although you can do the exercise in any position, sit with your back straight while learning the exercise. Place the tip of your tongue against the ridge of tissue just behind your upper front teeth, and keep it there through the entire exercise. You will be exhaling through your mouth around your tongue; try pursing your lips slightly if this seems awkward.  Exhale completely through your mouth, making a whoosh sound.  Close your mouth and inhale quietly through your nose to a mental count of four.    Hold your breath for a breaths. Then let the breath come naturally without trying to influence it. Ideally it will be quiet and slow, but depth and rhythm may vary.  To begin the exercise, count \"one\" to yourself as you exhale.  The next time you exhale, count \"two,\" and so on up to [place]. \"   Then begin a new cycle, counting \"one\" on the next exhalation. Never count higher than \"five,\" and count only when you exhale. You will know your attention has wandered when you find yourself up to \"eight,\" \"12,\" even \"19. \"  Try to do 10 minutes of this form of meditation. 4. Pt will promote effective self care habits daily-(sleep, positive activities, relaxation)  5. Pt will follow up with PRISCILA Souza next week.

## 2020-07-09 RX ORDER — OXYBUTYNIN CHLORIDE 10 MG/1
10 TABLET, EXTENDED RELEASE ORAL DAILY
Qty: 30 TABLET | Refills: 11 | Status: SHIPPED | OUTPATIENT
Start: 2020-07-09 | End: 2021-08-13

## 2020-07-09 RX ORDER — KETOROLAC TROMETHAMINE 10 MG/1
10 TABLET, FILM COATED ORAL EVERY 8 HOURS PRN
Qty: 20 TABLET | Refills: 0 | Status: SHIPPED | OUTPATIENT
Start: 2020-07-09 | End: 2020-08-06 | Stop reason: SDUPTHER

## 2020-07-12 ENCOUNTER — PATIENT MESSAGE (OUTPATIENT)
Dept: FAMILY MEDICINE CLINIC | Age: 40
End: 2020-07-12

## 2020-07-13 NOTE — TELEPHONE ENCOUNTER
From: Yon Cooley  To: Joselin Hess, EVA - CNP  Sent: 7/12/2020 8:36 PM EDT  Subject: Visit Follow-Up Question    Do we know what the game plan is going to be for my right side?  Jacklyn Bailey has not contacted me at all

## 2020-08-07 RX ORDER — KETOROLAC TROMETHAMINE 10 MG/1
10 TABLET, FILM COATED ORAL EVERY 8 HOURS PRN
Qty: 20 TABLET | Refills: 0 | Status: SHIPPED | OUTPATIENT
Start: 2020-08-07 | End: 2020-09-03 | Stop reason: SDUPTHER

## 2020-08-14 ENCOUNTER — PATIENT MESSAGE (OUTPATIENT)
Dept: FAMILY MEDICINE CLINIC | Age: 40
End: 2020-08-14

## 2020-08-17 NOTE — TELEPHONE ENCOUNTER
From: Sumanth Fraga  To: EVA Sofia - CNP  Sent: 8/14/2020 3:10 PM EDT  Subject: Visit Follow-Up Question    I still have not heard from pain management. This right side is not doing to hot.  I think I have an infection somewhere so I either need an appointment or antibiotics

## 2020-09-03 RX ORDER — QUETIAPINE FUMARATE 50 MG/1
50 TABLET, FILM COATED ORAL NIGHTLY
Qty: 30 TABLET | Refills: 5 | Status: SHIPPED | OUTPATIENT
Start: 2020-09-03 | End: 2020-09-28 | Stop reason: SDUPTHER

## 2020-09-03 RX ORDER — KETOROLAC TROMETHAMINE 10 MG/1
10 TABLET, FILM COATED ORAL EVERY 8 HOURS PRN
Qty: 20 TABLET | Refills: 0 | Status: SHIPPED | OUTPATIENT
Start: 2020-09-03 | End: 2020-10-02 | Stop reason: SDUPTHER

## 2020-09-03 RX ORDER — TIZANIDINE 4 MG/1
4 TABLET ORAL EVERY 8 HOURS PRN
Qty: 90 TABLET | Refills: 5 | Status: SHIPPED | OUTPATIENT
Start: 2020-09-03 | End: 2020-12-05 | Stop reason: SDUPTHER

## 2020-09-03 RX ORDER — DIVALPROEX SODIUM 500 MG/1
TABLET, EXTENDED RELEASE ORAL
Qty: 60 TABLET | Refills: 2 | Status: SHIPPED | OUTPATIENT
Start: 2020-09-03 | End: 2021-01-27 | Stop reason: SDUPTHER

## 2020-09-18 ENCOUNTER — TELEMEDICINE (OUTPATIENT)
Dept: FAMILY MEDICINE CLINIC | Age: 40
End: 2020-09-18
Payer: MEDICARE

## 2020-09-18 PROCEDURE — 99213 OFFICE O/P EST LOW 20 MIN: CPT | Performed by: NURSE PRACTITIONER

## 2020-09-18 PROCEDURE — G8428 CUR MEDS NOT DOCUMENT: HCPCS | Performed by: NURSE PRACTITIONER

## 2020-09-18 RX ORDER — AMOXICILLIN 875 MG/1
875 TABLET, COATED ORAL 2 TIMES DAILY
Qty: 20 TABLET | Refills: 0 | Status: SHIPPED | OUTPATIENT
Start: 2020-09-18 | End: 2020-09-28

## 2020-09-18 NOTE — PROGRESS NOTES
Subjective:      Patient ID: Crystal Mckay is a 45 y.o. female    HPI: Acute for tooth pain    TELEHEALTH EVALUATION -- Audio/Visual (During BFIXJ-00 public health emergency)    Services were provided through a video synchronous discussion virtually to substitute for in-person clinic visit. Patient and provider were located at their individual homes. Patient has requested an audio/video evaluation for the following concern(s):    Chief Complaint   Patient presents with    Dental Pain     Cracked tooth back left side. Unable to see Dental - fighting with insurance. Gums tender, tooth pain and feels swollen. Denies drainage. Able to open mouth up completed. Patient Active Problem List   Diagnosis    Smoking addiction    COPD (chronic obstructive pulmonary disease) ([facility] [address]    ANTHONY (generalized anxiety disorder)    Chronic pansinusitis    Bilateral occipital neuralgia    Cervicogenic headache    Chronic migraine without aura without status migrainosus, not intractable       Review of Systems   Constitutional: Negative for chills and fever. HENT: Positive for dental problem. Respiratory: Negative for cough and shortness of breath. Cardiovascular: Negative for chest pain. Gastrointestinal: Negative for abdominal pain and nausea. Skin: Negative for rash. Neurological: Negative for dizziness, light-headedness and headaches. Psychiatric/Behavioral: Negative. Objective:   Physical Exam  Constitutional:       General: She is not in acute distress. Appearance: She is not ill-appearing. Pulmonary:      Effort: Pulmonary effort is normal. No respiratory distress. Neurological:      Mental Status: She is alert and oriented to person, place, and time. Psychiatric:         Mood and Affect: Mood normal.         Behavior: Behavior normal.         Assessment:       Diagnosis Orders   1.  Dental infection  amoxicillin (AMOXIL) 875 MG tablet       Plan:   Orders as above   mg TID PRN  Salt water gargles  Follow up with DENTAL  RTO if symptoms worsen or stay the same      Due to this being a TeleHealth encounter (During ECVVC-36 public health emergency), evaluation of the following organ systems was limited: Vitals/Constitutional/EENT/Resp/CV/GI//MS/Neuro/Skin/Heme-Lymph-Imm. Pursuant to the emergency declaration under the [address] [place] [address] authority and the [employer] and [employer] Act, this Virtual Visit was conducted with patient's (and/or legal guardian's) consent, to reduce the patient's risk of exposure to COVID-19 and provide necessary medical care. The patient (and/or legal guardian) has also been advised to contact this office for worsening conditions or problems, and seek emergency medical treatment and/or call 911 if deemed necessary.       [unfilled]

## 2020-09-21 ASSESSMENT — ENCOUNTER SYMPTOMS
COUGH: 0
NAUSEA: 0
ABDOMINAL PAIN: 0
SHORTNESS OF BREATH: 0

## 2020-09-29 RX ORDER — QUETIAPINE FUMARATE 50 MG/1
50 TABLET, FILM COATED ORAL NIGHTLY
Qty: 30 TABLET | Refills: 5 | Status: SHIPPED | OUTPATIENT
Start: 2020-09-29 | End: 2020-12-01 | Stop reason: SDUPTHER

## 2020-09-29 RX ORDER — NAPROXEN 500 MG/1
500 TABLET ORAL 2 TIMES DAILY WITH MEALS
Qty: 20 TABLET | Refills: 0 | Status: SHIPPED | OUTPATIENT
Start: 2020-09-29 | End: 2020-12-05 | Stop reason: SDUPTHER

## 2020-10-01 ENCOUNTER — OFFICE VISIT (OUTPATIENT)
Dept: PHYSICAL MEDICINE AND REHAB | Age: 40
End: 2020-10-01
Payer: MEDICARE

## 2020-10-01 VITALS
TEMPERATURE: 97.3 F | WEIGHT: 175 LBS | SYSTOLIC BLOOD PRESSURE: 136 MMHG | HEIGHT: 60 IN | DIASTOLIC BLOOD PRESSURE: 80 MMHG | BODY MASS INDEX: 34.36 KG/M2

## 2020-10-01 PROCEDURE — G8417 CALC BMI ABV UP PARAM F/U: HCPCS | Performed by: PHYSICAL MEDICINE & REHABILITATION

## 2020-10-01 PROCEDURE — G8427 DOCREV CUR MEDS BY ELIG CLIN: HCPCS | Performed by: PHYSICAL MEDICINE & REHABILITATION

## 2020-10-01 PROCEDURE — G8484 FLU IMMUNIZE NO ADMIN: HCPCS | Performed by: PHYSICAL MEDICINE & REHABILITATION

## 2020-10-01 PROCEDURE — 99213 OFFICE O/P EST LOW 20 MIN: CPT | Performed by: PHYSICAL MEDICINE & REHABILITATION

## 2020-10-01 PROCEDURE — 4004F PT TOBACCO SCREEN RCVD TLK: CPT | Performed by: PHYSICAL MEDICINE & REHABILITATION

## 2020-10-01 NOTE — PROGRESS NOTES
{Blank multiple:19196::\"Spine Medicine\",\"Pain Management\",\"Spasticity\",\"Cervical Dystonia\",\"Alternative Medicine\",\"FOLLOW UP\"} Consultation    [address] [facility] [address]  [facility] [address]  Dept: [phone]  Loc: [address], APRN - CNP  [facility], [facility] [address]  [place], [address] [facility] Sky Fischer is a 45 y.o. female, who came today due to  {Blank multiple:19196::\"back pain\",\"neck pain\",\"shoulder pain\",\"hip pain\",\"knee pain\",\"botox injection for headache management\",\"discussion of result\",\" post injection follow up\",\"prescription refill\",\"new problem\",\" *** \",\" referral from ***\"}    Cause of the symptom(s): {Blank multiple:19196::\"degenerative (arthritis)\",\"fall\",\"fracture\",\"work-related\",\"sports-related\",\"motor vehicle accident\",\"headache\"}    Onset: {NUMBER:21184}{DAYS:21186}    Quality of Symptoms: {Blank multiple:19196::\"aching\",\"throbbing\",\"burning\",\"shooting\",\"numbness\",\"tingling\"}    Aggravating factors: {Blank multiple:19196::\"lifting\",\"twisting\",\"bending forward\",\"bending backward\",\"coughing\",\"sneezing\"}    Relieving factors: {Blank multiple:19196::\"lying down\",\"bending\",\"use of medication\",\"exercise\",\"rest\",\"injection\"}    Red Flags: {Blank multiple:19196::\"malignancy\",\"infection\",\"fever\",\"history of drug abuse\",\"pain at night\",\"pain with lying down\",\"osteoporosis\"}    Treatment done: {Blank multiple:96890::\"physical therapy\",\"injection\",\"anti-inflammatory medication\",\"muscle relaxant\",\"steroid\",\"opioid\"}      Current pain level: {NUMBER 1-10:5826209943} on the scale of 0-10 ( 10 being worst)       Allergies   Allergen Reactions    Botox [Onabotulinumtoxina (Cosmetic)]      Swelling 2 hours after injection.     Other      Injections through [facility]    Topamax [Topiramate]      Chest Pains and lose of feeling in extremities    Acetaminophen Nausea And Vomiting     \"vomit blood\"    Aspirin Nausea And Vomiting     Pt has gastric ulcers    Ibuprofen Nausea And Vomiting     Pt has gastric ulcers       Social History     Socioeconomic History    Marital status: Single     Spouse name: Not on file    Number of children: 3    Years of education: 15    Highest education level: High school graduate   Occupational History    Not on file   Social Needs    Financial resource strain: Very hard    Food insecurity     Worry: Often true     Inability: Often true    Transportation needs     Medical: No     Non-medical: No   Tobacco Use    Smoking status: Current Every Day Smoker     Packs/day: 0.50     Types: Cigarettes    Smokeless tobacco: Never Used   Substance and Sexual Activity    Alcohol use: No     Alcohol/week: 0.0 standard drinks    Drug use: No    Sexual activity: Yes   Lifestyle    Physical activity     Days per week: Not on file     Minutes per session: Not on file    Stress: Not on file   Relationships    Social connections     Talks on phone: Not on file     Gets together: Not on file     Attends Spiritism service: Not on file     Active member of club or organization: Not on file     Attends meetings of clubs or organizations: Not on file     Relationship status: Not on file    Intimate partner violence     Fear of current or ex partner: Not on file     Emotionally abused: Not on file     Physically abused: Not on file     Forced sexual activity: Not on file   Other Topics Concern    Not on file   Social History Narrative    Not on file       Past Medical History:   Diagnosis Date    Anxiety     Asthma     COPD (chronic obstructive pulmonary disease) ([facility] [address]     Depression     Migraines        Past Surgical History:   Procedure Laterality Date    CHOLECYSTECTOMY  2003    DILATION AND CURETTAGE OF UTERUS  2009    HYSTERECTOMY  2011    IGS ENDO SINUS SX  07/12/2017    IGS Endoscopic Ethmoidectomy, Anterior and Posterior Bilateral, Bilateral Maxillary Antrostomy, Julisa Bullosa Resection Left Middle Turbinate Septoplasty by Dr Naldo Parekh  2005       Prior to Visit Medications    Medication Sig Taking? Authorizing Provider   naproxen (NAPROSYN) 500 MG tablet Take 1 tablet by mouth 2 times daily (with meals)  EVA Mckinney CNP   QUEtiapine (SEROQUEL) 50 MG tablet Take 1 tablet by mouth nightly  EVA Mckinney CNP   divalproex (DEPAKOTE ER) 500 MG extended release tablet TAKE 1 TABLET BY MOUTH ONCE BID  EVA Mckinney CNP   tiZANidine (ZANAFLEX) 4 MG tablet Take 1 tablet by mouth every 8 hours as needed (neck pain)  EVA Mckinney CNP   ketorolac (TORADOL) 10 MG tablet Take 1 tablet by mouth every 8 hours as needed for Pain  EVA Mckinney CNP   oxybutynin (DITROPAN-XL) 10 MG extended release tablet Take 1 tablet by mouth daily  EVA Mckinney CNP   gabapentin (NEURONTIN) 800 MG tablet Take 1 tablet by mouth 4 times daily for 180 days. EVA Mckinney CNP   albuterol sulfate HFA (VENTOLIN HFA) 108 (90 Base) MCG/ACT inhaler Inhale 1-2 puffs into the lungs every 6 hours as needed for Wheezing  EVA Mckinney CNP   vilazodone HCl (VIIBRYD) 10 MG TABS Take 1 tablet by mouth daily  EVA Mckinney CNP   saline nasal gel (AYR) GEL by Nasal route as needed for Congestion  EVA Mckinney CNP   Respiratory Therapy Supplies (NEBULIZER COMPRESSOR) KIT 1 kit by Does not apply route once for 1 dose  EVA Mckinney CNP   albuterol (PROVENTIL) (2.5 MG/3ML) 0.083% nebulizer solution Take 3 mLs by nebulization every 6 hours as needed for Wheezing  EVA Mckinney CNP   budesonide-formoterol (SYMBICORT) 80-4.5 MCG/ACT AERO Inhale 2 puffs into the lungs 2 times daily Rinse mouth after use.   EVA Mckinney CNP   Multiple Vitamins-Calcium (ONE-A-DAY WOMENS PO) Take by mouth daily   Historical Provider, MD       Family History   Problem Relation Age of Onset    Depression Mother    [facility] Migraines Mother     Cancer Father         Prostate and Testicular Cancer    Depression Father     Heart Disease Father     Migraines Father     Heart Disease Maternal Grandfather     Heart Disease Paternal Grandfather        Review of Systems        Ht 5' (1.524 m)   Wt 175 lb (79.4 kg)   BMI 34.18 kg/m²       Physical Exam    Neurologic Exam    Ortho Exam        Assessment and Plan:     {No diagnosis found. (Refresh or delete this SmartLink)}      Objective goals for treatment: {Blank multiple:17675::\"Evaluation and treatment\",\"Trialing Non opioid therapies\",\"Exploring interventional pain therapies\",\"Continuing opioid therapy\",\"Trial of Alternative Therapies\"}    Treatment tried includes the following: at least {NUMBER 1-10:0273834622} {DAYS:21186} for trialing {Blank multiple:95818::\"opioid therapies\",\"injection(s)\",\"medication(s)\",\"physical therapy\",\"alternative therapies\"}. For now, patient and I decided to proceed with the following: {Blank multiple:81999::\"Physical Therapy\",\"Medication Management\",\"Anti-spasmodics\",\"Interventional pain therapies\",\"Alternative Therapies\"}    Planned duration of treatment: {NUMBER 1-10:7242227096} {DAYS:21186} for trialing {Blank multiple:10277::\"opioid therapies\",\"injection(s)\",\"medication(s)\",\"physical therapy\",\"alternative therapies\"}. Further assessment and followup in  {NUMBER 1-10:5377149521} {DAYS:21186} for {Blank multiple:39863::\"refill medication(s)\",\"re-evaluation of new treatment(s)\",\"change therapy\"}. I have reviewed the chief complaint and HPI including the [facility] and Vital documentation by my staff and I agree with their documentation and have added where applicable. Time spent with patient was *** minutes. More than 50% was spent counseling/coordinating the patient's care.        @RUBÉN@    Niraj Hull MD Spine Medicine/PM&R

## 2020-10-01 NOTE — PROGRESS NOTES
FOLLOW UP APPOINTMENT:       Angeline Akins MD   10/1/2020     Mannie Habermann is a 45 y.o. female, who came for a new problem , neck pain and tension on right side     Cause of the symptom(s): patient unsure      Onset: years    Prior reatment done: physical therapy and injection     Current pain level: 8 on the scale of 0-10 ( 10 being worst)     Quality of Symptoms: sharp and burning     Aggravating factors: heat     Relieving factors: rest and lying down         Allergies   Allergen Reactions    Botox [Onabotulinumtoxina (Cosmetic)]      Swelling 2 hours after injection.     Other      In[facility]    Topamax [Topiramate]      Chest Pains and lose of feeling in extremities    Acetaminophen Nausea And Vomiting     \"vomit blood\"    Aspirin Nausea And Vomiting     Pt has gastric ulcers    Ibuprofen Nausea And Vomiting     Pt has gastric ulcers       Social History     Socioeconomic History    Marital status: Single     Spouse name: Not on file    Number of children: 3    Years of education: 15    Highest education level: High school graduate   Occupational History    Not on file   Social Needs    Financial resource strain: Very hard    Food insecurity     Worry: Often true     Inability: Often true    Transportation needs     Medical: No     Non-medical: No   Tobacco Use    Smoking status: Current Every Day Smoker     Packs/day: 0.50     Types: Cigarettes    Smokeless tobacco: Never Used   Substance and Sexual Activity    Alcohol use: No     Alcohol/week: 0.0 standard drinks    Drug use: No    Sexual activity: Yes   Lifestyle    Physical activity     Days per week: Not on file     Minutes per session: Not on file    Stress: Not on file   Relationships    Social connections     Talks on phone: Not on file     Gets together: Not on file     Attends [employer] service: Not on file     Active member of club or organization: Not on file     Attends meetings of oxybutynin (DITROPAN-XL) 10 MG extended release tablet Take 1 tablet by mouth daily Yes Sofia Mckeon APRN - CNP   gabapentin (NEURONTIN) 800 MG tablet Take 1 tablet by mouth 4 times daily for 180 days. Yes Sofia Hoganes, APRN - CNP   albuterol sulfate HFA (VENTOLIN HFA) 108 (90 Base) MCG/ACT inhaler Inhale 1-2 puffs into the lungs every 6 hours as needed for Wheezing Yes Sofia Mckeon, APRN - CNP   vilazodone HCl (VIIBRYD) 10 MG TABS Take 1 tablet by mouth daily Yes EVA Richard - CNP   saline nasal gel (AYR) GEL by Nasal route as needed for Congestion Yes EVA Richard - CNP   albuterol (PROVENTIL) (2.5 MG/3ML) 0.083% nebulizer solution Take 3 mLs by nebulization every 6 hours as needed for Wheezing Yes EVA Richard - CNP   budesonide-formoterol (SYMBICORT) 80-4.5 MCG/ACT AERO Inhale 2 puffs into the lungs 2 times daily Rinse mouth after use. Yes Sofia Mckeon APRN - CNP   Multiple Vitamins-Calcium (ONE-A-DAY WOMENS PO) Take by mouth daily  Yes Historical Provider, MD   Respiratory Therapy Supplies (NEBULIZER COMPRESSOR) KIT 1 kit by Does not apply route once for 1 dose  EVA Richard - CNP       Review of Systems : All systems reviewed, all unremarkable other than HPI/subjective. No change since last visit. Denies  fever, chills, infection or non healing wound. /80   Temp 97.3 °F (36.3 °C)   Ht 5' (1.524 m)   Wt 175 lb (79.4 kg)   BMI 34.18 kg/m²       Physical Exam  HENT:      Head: Atraumatic. Neck:      Musculoskeletal: Neck supple. Pulmonary:      Effort: Pulmonary effort is normal. No respiratory distress. Musculoskeletal:         General: Tenderness present. Comments: Cervical spine, right side, paraspinals, subocciput   Neurological:      Mental Status: She is alert and oriented to person, place, and time. Psychiatric:         Behavior: Behavior normal.       Assessment and Plan:      Diagnosis Orders   1.  Cervicogenic headache  DC INJ

## 2020-10-02 RX ORDER — KETOROLAC TROMETHAMINE 10 MG/1
10 TABLET, FILM COATED ORAL EVERY 8 HOURS PRN
Qty: 20 TABLET | Refills: 0 | Status: SHIPPED | OUTPATIENT
Start: 2020-10-02 | End: 2020-11-04 | Stop reason: SDUPTHER

## 2020-10-12 ENCOUNTER — TELEPHONE (OUTPATIENT)
Dept: PHYSICAL MEDICINE AND REHAB | Age: 40
End: 2020-10-12

## 2020-10-12 NOTE — TELEPHONE ENCOUNTER
Pt cancelled procedure on 10- and follow up. Pt states her  is currently in the ICU due to a heart attack and she needs to be strong for him. Will call back to reschedule when things calm down. Pt asking if she can have any medication for pain until she can reschedule. Pharmacy is [facility] on [address]

## 2020-10-16 NOTE — TELEPHONE ENCOUNTER
Spoke with patient. She stated she is still using medical marijuana. I gave patient the message about needing to be negative for General acute hospital before considering Tramadol. She explained she would think about it and get back with us. And will get back with us if and when she decides to reschedule procedure.

## 2020-11-05 RX ORDER — KETOROLAC TROMETHAMINE 10 MG/1
10 TABLET, FILM COATED ORAL EVERY 8 HOURS PRN
Qty: 20 TABLET | Refills: 0 | Status: SHIPPED | OUTPATIENT
Start: 2020-11-05 | End: 2020-12-09 | Stop reason: SDUPTHER

## 2020-12-01 ENCOUNTER — TELEMEDICINE (OUTPATIENT)
Dept: FAMILY MEDICINE CLINIC | Age: 40
End: 2020-12-01
Payer: MEDICARE

## 2020-12-01 PROCEDURE — G8428 CUR MEDS NOT DOCUMENT: HCPCS | Performed by: NURSE PRACTITIONER

## 2020-12-01 PROCEDURE — 99213 OFFICE O/P EST LOW 20 MIN: CPT | Performed by: NURSE PRACTITIONER

## 2020-12-01 RX ORDER — QUETIAPINE FUMARATE 200 MG/1
200 TABLET, FILM COATED ORAL NIGHTLY
Qty: 30 TABLET | Refills: 5 | Status: SHIPPED | OUTPATIENT
Start: 2020-12-01 | End: 2021-01-27 | Stop reason: SDUPTHER

## 2020-12-01 ASSESSMENT — ENCOUNTER SYMPTOMS
NAUSEA: 0
ABDOMINAL PAIN: 0
SHORTNESS OF BREATH: 0
COUGH: 0

## 2020-12-01 NOTE — PROGRESS NOTES
Subjective:      Patient ID: Dora Phan is a 45 y.o. female    HPI: Acute for depression    TELEHEALTH EVALUATION -- Audio/Visual (During [facility] [place] [address] emergency)    Services were provided through a video synchronous discussion virtually to substitute for in-person clinic visit. Patient and provider were located at their individual homes. Patient has requested an audio/video evaluation for the following concern(s):    Chief Complaint   Patient presents with    Depression        passed away 11/12/20 - was in the hospital for 1+ month after a heart attack that resulted in multiple complications. Patient is relieving every moment and every day he was in the hospital.  Second guessing herself on getting him to the hospital sooner. Struggling with the after math. Anxious and depressed. Sleeping in poor. underlying Bipolar Depression. On Viibryd 10 mg and Seroquel 50 mg. Has a good support system. Staying with her family currently. Still working. Has a counselor she needs to get in contact with. Patient Active Problem List   Diagnosis    Smoking addiction    COPD (chronic obstructive pulmonary disease) ([address]    ANTHONY (generalized anxiety disorder)    Chronic pansinusitis    Bilateral occipital neuralgia    Cervicogenic headache    Chronic migraine without aura without status migrainosus, not intractable       Review of Systems   Constitutional: Negative for chills and fever. HENT: Negative. Respiratory: Negative for cough and shortness of breath. Cardiovascular: Negative for chest pain. Gastrointestinal: Negative for abdominal pain and nausea. Skin: Negative for rash. Neurological: Negative for dizziness, light-headedness and headaches. Psychiatric/Behavioral: Positive for dysphoric mood and sleep disturbance. Negative for self-injury and suicidal ideas. The patient is nervous/anxious.         Objective:   Physical Exam  Constitutional:       General: She is not in acute distress. Appearance: She is not ill-appearing. Pulmonary:      Effort: Pulmonary effort is normal. No respiratory distress. Neurological:      Mental Status: She is alert and oriented to person, place, and time. Psychiatric:         Mood and Affect: Mood is anxious and depressed. Speech: Speech normal.         Behavior: Behavior is slowed and withdrawn. Thought Content: Thought content normal.         Cognition and Memory: Cognition normal.         Judgment: Judgment is impulsive. Assessment:       Diagnosis Orders   1. Bipolar 1 disorder, mixed (HCC)  QUEtiapine (SEROQUEL) 200 MG tablet   2. Dysfunctional grieving  QUEtiapine (SEROQUEL) 200 MG tablet       Plan:   Stressed importance of counselor  Increase Seroquel 200 mg HS   - help with sleep and anxiety  Non-pharm tx discussed  Continue Viibryd  RTO prn - call with update    Due to this being a TeleHealth encounter (During JMEUU-78 public health emergency), evaluation of the following organ systems was limited: Vitals/Constitutional/EENT/Resp/CV/GI//MS/Neuro/Skin/Heme-Lymph-Imm. Pursuant to the emergency declaration under the [facility], [address] [place] [address] authority and the [employer] Act, this Virtual Visit was conducted with patient's (and/or legal guardian's) consent, to reduce the patient's risk of exposure to COVID-19 and provide necessary medical care.   The patient (and/or legal guardian) has also been advised to contact this office for worsening conditions or problems, and seek emergency medical treatment and/or call 911 if

## 2020-12-03 ENCOUNTER — VIRTUAL VISIT (OUTPATIENT)
Dept: BEHAVIORAL/MENTAL HEALTH CLINIC | Age: 40
End: 2020-12-03
Payer: MEDICARE

## 2020-12-03 ENCOUNTER — PATIENT MESSAGE (OUTPATIENT)
Dept: FAMILY MEDICINE CLINIC | Age: 40
End: 2020-12-03

## 2020-12-03 PROCEDURE — 90834 PSYTX W PT 45 MINUTES: CPT | Performed by: SOCIAL WORKER

## 2020-12-03 PROCEDURE — 4004F PT TOBACCO SCREEN RCVD TLK: CPT | Performed by: SOCIAL WORKER

## 2020-12-03 NOTE — PROGRESS NOTES
Behavioral Health Consultation  TOSHA Pierson-S  12/3/2020  11:30 AM EDT  This note will not be viewable in [facility] for the following reason(s). This is a Psychotherapy Note. Time spent with Patient: 38 minutes  This is patient's third  [facility] [place]. Reason for Consult:    Chief Complaint   Patient presents with    Other     greif reaction    Anxiety     Referring Provider: ABEBE Nix  Pt provided informed consent for the behavioral health program. Discussed with patient model of service to include the limits of confidentiality (i.e. abuse reporting, suicide intervention, etc.) and short-term intervention focused approach. Pt indicated understanding. Feedback given to PCP. [facility]  [facility]  Dept: [phone]  Dept Fax: [phone]    TELEHEALTH EVALUATION -- Audio/Visual (During UCU-22 public health emergency)  This visit was performed via a synchronous telecommunication system. Pt location: [facility]  Provider location: [facility] ([address] [place] [address])  Pt notified that this was a virtual visit and that we will submit a claim for reimbursement to their insurance company. They were made aware that any copays, coinsurance amounts, or other amounts not covered will be their responsibility. Pt accepted?  yes  1  Pursuant to the emergency declaration under the [facility], [facility]5 waiver authority and the [employer] Act, this Virtual  Visit was conducted, with patient's consent, to reduce the patient's risk of exposure to COVID-19 and provide continuity of care for an established patient. Services were provided through a video synchronous discussion virtually to substitute for in-person clinic visit. S:  Pt shared that her long term partner  about two months ago, following medical complications from having a heart attack.  She processed her experience from the onset of the emergency through learning of his death unexpectedly. Pt acknowledged feelings of frustration and anger with a sense that information was withheld that kept her from being with him in his final hour. She evaluated symptoms of feeling anxious throughout the day, difficulties with sleep, and being overwhelmed with her grief. She denied suicidal thoughts. Pt was advised of indications of depressive symptoms and instructed to monitor if symptoms worsen or interfere with daily functioning, to consider following-up with either your primary care team or a behavioral health provider for possible counseling or medication management. If suicidal thoughts are experienced, call 911. An additional 24/7 resource is the [facility] at [phone]-UAKT ([phone]). Pt will attend a follow up appointment via [employer] next week. O:  MSE:    Appearance    crying  Appetite normal  Sleep disturbance Yes  Fatigue Yes  Loss of pleasure Yes  Impulsive behavior No  Speech    normal rate  Mood    Depressed  Affect    normal affect  Thought Content    intact  Thought Process    coherent  Associations    logical connections  Insight    Fair  Judgment    Intact  Orientation    oriented to person, place, time, and general circumstances  Memory    recent and remote memory intact  Attention/Concentration    intact  Morbid ideation No  Suicide Assessment    no suicidal ideation    History:    TOBACCO:   reports that she has been smoking cigarettes. She has been smoking about 0.50 packs per day. She has never used smokeless tobacco.  ETOH:   reports no history of alcohol use.   Family History:   Family History   Problem Relation Age of Onset    Depression Mother    Martell Migraines Mother     Cancer Father         Prostate and Testicular Cancer    Depression Father     Heart Disease Father     Migraines Father     Heart Disease Maternal Grandfather     Heart Disease Paternal Grandfather        A:     Diagnosis:    1. Grief reaction    2. ANTHONY (generalized anxiety disorder)          Diagnosis Date    Anxiety     Asthma     COPD (chronic obstructive pulmonary disease) ([facility]     Depression     Migraines        Plan: Pt will attend a follow up appointment next week to assess symptoms and evaluate effectiveness of coping skills. Pt interventions:  Reviewed information about deep breathing, grief, and self care. Pt Behavioral Change Plan:     1. Pt will practice deep breathing exercises 2-3x's daily for 3-5 minutes. 2. Pt will familiarize self with charlie Virtual Hope Box. 3. Pt will promote effective self care management daily-sleep, increased activities, supportive relationships, relaxation. 4. Pt will attend a follow up appointment next week to assess symptoms and evaluate effectiveness of coping skills.

## 2020-12-04 RX ORDER — ALPRAZOLAM 0.5 MG/1
0.5 TABLET ORAL 3 TIMES DAILY PRN
Qty: 21 TABLET | Refills: 0 | Status: SHIPPED | OUTPATIENT
Start: 2020-12-04 | End: 2020-12-11

## 2020-12-04 NOTE — TELEPHONE ENCOUNTER
From: Neymar Pérez  To: Leticia Thompson, EVA - CNP  Sent: 12/3/2020 10:22 PM EST  Subject: Prescription Question    So I talked to Greg Faust today and we discussed trying to get me back on my Xanax because of my PTSD with the situation at hand.

## 2020-12-09 RX ORDER — KETOROLAC TROMETHAMINE 10 MG/1
10 TABLET, FILM COATED ORAL EVERY 8 HOURS PRN
Qty: 20 TABLET | Refills: 0 | Status: SHIPPED | OUTPATIENT
Start: 2020-12-09 | End: 2021-01-04 | Stop reason: SDUPTHER

## 2020-12-10 ENCOUNTER — VIRTUAL VISIT (OUTPATIENT)
Dept: BEHAVIORAL/MENTAL HEALTH CLINIC | Age: 40
End: 2020-12-10
Payer: MEDICARE

## 2020-12-10 PROCEDURE — 90832 PSYTX W PT 30 MINUTES: CPT | Performed by: SOCIAL WORKER

## 2020-12-10 PROCEDURE — 4004F PT TOBACCO SCREEN RCVD TLK: CPT | Performed by: SOCIAL WORKER

## 2020-12-10 NOTE — PROGRESS NOTES
anger and thoughts of \"what if\" related to her partners recent unexpected death. She identified that reminders of her loss were highly prevalent at her job leading her to quit. Pt acknowledged a new job as a welcomed change and has upcoming interviews. She is staying with family, routinely, to have support. She denied suicidal thoughts, but expressed fleeting thoughts of being better off dead. Pt established knowing she has responsibilities to her family and that her pain will get better, in determining she is not at risk of ending her life. Pt was advised of indications of depressive symptoms and instructed to monitor if symptoms worsen or interfere with daily functioning, to consider following-up with either your primary care team or a behavioral health provider for possible counseling or medication management. If suicidal thoughts are experienced, call 911. An additional 24/7 resource is the [employer] at [phone]-GNHP ([phone]). Pt will attend a follow up appointment via [employer] in two weeks. O:  MSE:    Appearance    calm  Appetite normal  Sleep disturbance Yes  Fatigue Yes  Loss of pleasure Yes  Impulsive behavior No  Speech    normal rate  Mood    Depressed  Affect    normal affect  Thought Content    intact  Thought Process    coherent  Associations    logical connections  Insight    Fair  Judgment    Intact  Orientation    oriented to person, place, time, and general circumstances  Memory    recent and remote memory intact  Attention/Concentration    intact  Morbid ideation No  Suicide Assessment    no suicidal ideation; fleeting thoughts of being better off dead, no intention or desire. Strong protective factors of responsibility to her family. History:    TOBACCO:   reports that she has been smoking cigarettes. She has been smoking about 0.50 packs per day. She has never used smokeless tobacco.  ETOH:   reports no history of alcohol use.   Family History:   Family History   Problem Relation Age of Onset    Depression Mother    Val Amalia Migraines Mother     Cancer Father         Prostate and Testicular Cancer    Depression Father     Heart Disease Father     Migraines Father     Heart Disease Maternal Grandfather     Heart Disease Paternal Grandfather        A:     Diagnosis:    1. Grief reaction    2. Severe episode of recurrent major depressive disorder, without psychotic features ([facility]          Diagnosis Date    Anxiety     Asthma     COPD (chronic obstructive pulmonary disease) ([facility]     Depression     Migraines        Plan: Pt will attend a follow up appointment in two weeks  to assess symptoms and evaluate effectiveness of coping skills. Pt interventions:  Reviewed information about deep breathing, grief, and self care. Pt Behavioral Change Plan:     1. Pt will practice deep breathing exercises 2-3x's daily for 3-5 minutes. 2. Pt will familiarize self with charlie Virtual Hope Box. 3. Pt will promote effective self care management daily-sleep, increased activities, supportive relationships, relaxation. 4. Pt was advised of indications of depressive symptoms and instructed to monitor if symptoms worsen or interfere with daily functioning, to consider following-up with either your primary care team or a behavioral health provider for possible counseling or medication management. If suicidal thoughts are experienced, call 911. An additional 24/7 resource is the [facility] at 3-306-394-ECSX (1981). 5. Pt will attend a follow up appointment in two weeks to assess symptoms and evaluate effectiveness of coping skills.

## 2020-12-10 NOTE — PATIENT INSTRUCTIONS
1. Depression: Facts, Myths & Tips for Feeling Better    Facts    Depression is very common  Nearly 20% of the [place] population experiences a significant depression during their lifetime. Depression is treatable  Because depression affects so many, and can have such a powerful and negative impact on life, there has been an enormous amount of research conducted on how to reduce symptoms and improve functioning. As a result, we now know there are behaviors YOU can engage in to make yourself feel better. Depression is not a weakness  People suffer from depression for a variety of reasons, biological, environmental and behavioral.  Research indicates that [employer] is NOT one of the reasons people become depressed. Depression is not something you are powerless against  Evidence suggests that you can directly impact the intensity and duration of depression by what you do and by altering the way you think about certain things. The Downward Spiral    Depression often begins as a drop in mood due to an environmental or biological trigger that makes people feel less like being active. Being less active, in turn, often causes people to experience an even lower mood and feel even less like being active, and so the cycle begins. How can I start feeling better? The first and best way to reverse the downward cycle is to get active! Your body produces its own anti-depressants every time you exercise or do something pleasurable. Regular exercise is one of the very best ways to improve your mood. In fact some studies have shown that a solid exercise program is as effective as psychotherapy or anti-depressant medication for some people. *See physical activity section of handout    Force yourself to do something you found pleasurable before depression. This may be different for everyone and it doesnt matter if its gardening, playing bridge, walking, reading a novel, or simply talking to a close friend. What matters is that YOU find the activity pleasurable! Even if you dont feel like doing something pleasurable for yourself, DO IT ANYWAY. We call this the fake it until you make it principle. Educate yourself! Often people feel powerless against medical conditions because they do not understand what is happening in their body. Just by reading this handout you know more than most people about depression. Knowledge is power when you can apply it, and make yourself feel better. *See recommended reading list at the bottom of this handout\"    Begin to notice unhealthy and unhelpful thoughts! In addition to how we behave, how we think influences our mood directly. Notice recurrent or alarming thoughts that have an impact on your mood. Ask yourself is this type of thinking helping me or hurting me?  if your answer is it's hurting me here are some things you can do: *see disputation of negative thoughts section of handout  - Challenge the negative thought. Is it truly accurate? Wheres the proof? Become your own  and collect the facts.  - Reframe the negative thought. How can I think differently about this problem, situation, or view of myself? Allow yourself to view a situation from more than one angle, how might my spouse, friend, or someone I admire view this same problem?  - Use the best friend scenario. How would you help your best friend if he or she was having these same thoughts? Would you criticize him or her as harshly as you criticize yourself? Remember, YOU know YOU better than anyone else. You likely know what kinds of activities, thoughts and reinforcement you respond to. Doing whats easiest and most doable is the key. Pick 1 or 2 things that are easy and get started feeling better TODAY!     * Use the following handout sections to guide you through behavioral and thinking exercises to help you manage your depressive symptoms, improve your functioning and to begin living your life well again. Recommended readings on managing depression    - Feeling Good by Dr. Brionna Holguin. Rinaldi     - Mind over [place] by Trent Alba and [facility] [place] by Dr. Moctezuma Dy by Dr. Car Packer. Sapolsky      Disputation:  Challenging Upsetting Thinking    Examine your thoughts for key words:  1. must, need, got to, have to, should (unrealistic standards)  2. never, always, completely, totally, all everything, everyone (predictions /  labeling)  3. awful, terrible, horrible, unbearable, disaster, worst ever (labeling / predictions)  4. jerk, slob, creep, hypocrite, bully, stupid (labels)  Dispute or question the accuracy of the questionable thoughts. 1. Am I upsetting myself unnecessarily? How can I see this another way? 2. Is my thinking working for or against me? How could I view this in a less upsetting way? 3. What am I demanding must happen? What do I want or prefer, rather than need? 4. Am I making something too terrible? Is it really that awful? What would be so terrible about that? 5. Am I labeling a person? What is the action that I dont like? 6. Whats untrue about my thoughts? How can I stick to the facts? Whats the proof for what I am thinking or believing about this? 7. Am I using extreme, black-and-white language? What less extreme words might be more accurate? 8. Am I fortune telling or mind reading in a way that gets me upset or unhappy? What are the odds (percent chance -- e.g., there is a 5% chance. ..) that it will really turn out the way Im thinking or imagining? 9. What are my options in this situation? How would I like to respond? 10. Create more moderate, helpful, or realistic statements to replace the upsetting ones. 11. Have I had any experiences that show that this thought might not be totally true? 12. If my best friend or someone I loved had this thought, what would I tell them?   13. If my Physical Responses   Muscle aches   Insomnia   ? Heart rate   Headache   Weight gain   Nausea   Constipation   Dry mouth   Muscle twitching  Weight loss   Low energy   Weakness   Tight chest   Diarrhea   Dizziness   Trembling   Stomach cramps  Chills    Hot flashes   Sweating   Pounding heart  Choking feeling  Chest pain   Leg cramps   Numb hands/feet Dry throat   Appetite change  Face flushing   ? Blood pressure  Light-headedness  Feeling faint        Trouble swallowing   Rash ? Urination   Neck pain             Tingling hands/feet                                                                                             Emotional and Thought Responses   Restlessness   Agitation   Insecurity             Worthlessness   Anxiety   Stress    Depression             Hopelessness   Guilt    Defensiveness  Anger            Racing thoughts   Nightmares   Intense thinking  Sensitivity            Expecting the worst   Numbness   Lack of motivation  Mood swings             Forgetfulness   ? Concentration  Rigidity              Preoccupation  Intolerance                                                Behavioral Responses   Avoidance   Withdrawal   Neglect   ? Alcohol use    Smoking   ? Eating   Arguing        Poor appearance   ? Spending   Poor hygiene   ? Eating   Seeking reassurance   Nail biting   Skin picking   ? Talking         ? Body checking   Sexual problems  Foot tapping   Fidgeting Rapid walking    ? Exercise   Teeth clenching           Multitasking   Aggressive speaking       ? Fun activities  ? Sleeping      ? Relaxing activities     Seeking information     The parasympathetic nervous system in your body is designed to turn on your bodys relaxation response. Your behaviors and thinking can keep your bodys natural relaxation response from operating at its best.     Getting your body to relax on a daily basis for at least brief periods can help decrease unpleasant stress responses. breaths. Then let the breath come naturally without trying to influence it. Ideally it will be quiet and slow, but depth and rhythm may vary.  To begin the exercise, count \"one\" to yourself as you exhale.  The next time you exhale, count \"two,\" and so on up to [place]. \"   Then begin a new cycle, counting \"one\" on the next exhalation. Never count higher than \"five,\" and count only when you exhale. You will know your attention has wandered when you find yourself up to \"eight,\" \"12,\" even \"19. \"  Try to do 10 minutes of this form of meditation. 3. Pt was advised of indications of depressive symptoms and instructed to monitor if symptoms worsen or interfere with daily functioning, to consider following-up with either your primary care team or a behavioral health provider for possible counseling or medication management. If suicidal thoughts are experienced, call 911. An additional 24/7 resource is the [facility] at 8-439-589-VDNE ([phone]). 4. Pt will promote effective self care habits daily-(sleep, positive activities, relaxation)  5. Pt will follow up with Tobie Hatchet, LISW-S next week.

## 2020-12-15 ENCOUNTER — APPOINTMENT (OUTPATIENT)
Dept: CT IMAGING | Age: 40
End: 2020-12-15
Payer: MEDICARE

## 2020-12-15 ENCOUNTER — APPOINTMENT (OUTPATIENT)
Dept: GENERAL RADIOLOGY | Age: 40
End: 2020-12-15
Payer: MEDICARE

## 2020-12-15 ENCOUNTER — HOSPITAL ENCOUNTER (EMERGENCY)
Age: 40
Discharge: HOME | End: 2020-12-15
Payer: MEDICARE

## 2020-12-15 VITALS
OXYGEN SATURATION: 97 % | WEIGHT: 175 LBS | DIASTOLIC BLOOD PRESSURE: 73 MMHG | HEART RATE: 81 BPM | RESPIRATION RATE: 18 BRPM | HEIGHT: 60 IN | TEMPERATURE: 97.2 F | BODY MASS INDEX: 34.36 KG/M2 | SYSTOLIC BLOOD PRESSURE: 111 MMHG

## 2020-12-15 LAB
BUN, WHOLE BLOOD: 7 MG/DL (ref 8–26)
CHLORIDE, WHOLE BLOOD: 102 MEQ/L (ref 98–109)
CREAT SERPL-MCNC: 0.8 MG/DL (ref 0.5–1.2)
EKG ATRIAL RATE: 61 BPM
EKG P AXIS: 52 DEGREES
EKG P-R INTERVAL: 140 MS
EKG Q-T INTERVAL: 414 MS
EKG QRS DURATION: 86 MS
EKG QTC CALCULATION (BAZETT): 416 MS
EKG R AXIS: 41 DEGREES
EKG T AXIS: 58 DEGREES
EKG VENTRICULAR RATE: 61 BPM
GFR, ESTIMATED: 85 ML/MIN/1.73M2
GLUCOSE, WHOLE BLOOD: 77 MG/DL (ref 70–108)
HCT VFR BLD CALC: 42.1 % (ref 37–47)
HEMOGLOBIN: 13.8 GM/DL (ref 12–16)
MCH RBC QN AUTO: 30 PG (ref 27–31)
MCHC RBC AUTO-ENTMCNC: 32.8 GM/DL (ref 33–37)
MCV RBC AUTO: 92 FL (ref 81–99)
PDW BLD-RTO: 15 % (ref 11.5–14.5)
PLATELET # BLD: 172 THOU/MM3 (ref 130–400)
PMV BLD AUTO: 15.3 FL (ref 7.4–10.4)
POTASSIUM, WHOLE BLOOD: 3.9 MEQ/L (ref 3.5–4.9)
RBC # BLD: 4.6 MILL/MM3 (ref 4.2–5.4)
SODIUM BLD-SCNC: 141 MEQ/L (ref 138–146)
TOTAL CO2, WHOLE BLOOD: 29 MEQ/L (ref 23–33)
WBC # BLD: 9.5 THOU/MM3 (ref 4.8–10.8)

## 2020-12-15 PROCEDURE — 93005 ELECTROCARDIOGRAM TRACING: CPT | Performed by: NURSE PRACTITIONER

## 2020-12-15 PROCEDURE — 73030 X-RAY EXAM OF SHOULDER: CPT

## 2020-12-15 PROCEDURE — 36415 COLL VENOUS BLD VENIPUNCTURE: CPT

## 2020-12-15 PROCEDURE — 84520 ASSAY OF UREA NITROGEN: CPT

## 2020-12-15 PROCEDURE — 99215 OFFICE O/P EST HI 40 MIN: CPT

## 2020-12-15 PROCEDURE — 82565 ASSAY OF CREATININE: CPT

## 2020-12-15 PROCEDURE — 80051 ELECTROLYTE PANEL: CPT

## 2020-12-15 PROCEDURE — 82947 ASSAY GLUCOSE BLOOD QUANT: CPT

## 2020-12-15 PROCEDURE — 85025 COMPLETE CBC W/AUTO DIFF WBC: CPT

## 2020-12-15 PROCEDURE — 93010 ELECTROCARDIOGRAM REPORT: CPT | Performed by: INTERNAL MEDICINE

## 2020-12-15 PROCEDURE — 99214 OFFICE O/P EST MOD 30 MIN: CPT | Performed by: NURSE PRACTITIONER

## 2020-12-15 PROCEDURE — 70450 CT HEAD/BRAIN W/O DYE: CPT

## 2020-12-15 ASSESSMENT — ENCOUNTER SYMPTOMS
SHORTNESS OF BREATH: 0
BACK PAIN: 0
VOMITING: 0
BOWEL INCONTINENCE: 0
DIARRHEA: 0
DIFFICULTY BREATHING: 0
RHINORRHEA: 0
SINUS PRESSURE: 0
SORE THROAT: 0
VISUAL CHANGE: 0
TROUBLE SWALLOWING: 0
COUGH: 0
NAUSEA: 0
ABDOMINAL PAIN: 0
BLURRED VISION: 0
DOUBLE VISION: 0

## 2020-12-15 ASSESSMENT — PAIN SCALES - GENERAL: PAINLEVEL_OUTOF10: 8

## 2020-12-15 ASSESSMENT — PAIN DESCRIPTION - LOCATION: LOCATION: JAW;SHOULDER;NECK

## 2020-12-15 ASSESSMENT — PAIN DESCRIPTION - DESCRIPTORS: DESCRIPTORS: ACHING

## 2020-12-15 NOTE — ED PROVIDER NOTES
[address]  Urgent Care Encounter       CHIEF COMPLAINT       Chief Complaint   Patient presents with   Marielos Olvera Head Injury       Nurses Notes reviewed and I agree except as noted in the HPI. HISTORY OF PRESENT ILLNESS   Dora Phan is a 45 y.o. female who presents to the urgent care with her daughter stating last night she was in the shower at her home when she said she was rinsing her hair and apparently had a syncopal episode falling striking her head and left shoulder on the edge of the tub. The patient stated that she did go unconscious but is not sure how long she was on responsive. The daughter stated that she heard her fall and used a butter knife to open up the bathroom door. She stated that when she got in there the mother was awake at that time. There was no signs of bleeding or trauma last night at that time. The patient stated that today she has had pain to the left shoulder a headache and pain when she turns her head to the right. She denies any dizziness or tinnitus at the present time. The patient is awake alert and oriented x3 speech is clear patient does not appear to be in any obvious pain but rates her pain 8 on a 10 scale to the shoulder and left side of the neck. The patient did ambulate back to the room without any problems or assistance. The patient states that she is grieving from her  who  unexpectedly on . She denies any history of any seizure disorders and denies any history of syncopal events prior to last night. Patient has been on new medication since the death of her . The patient states that she is going to be seeing her primary care provider tomorrow but wanted to be checked tonight. The history is provided by the patient. No  was used. Fall  The accident occurred 12 to 24 hours ago. Fall occurred: Syncope while in the shower. She fell from a height of 3 to 5 ft. Impact surface:  The hematuria. Musculoskeletal: Positive for arthralgias and neck pain. Negative for back pain and neck stiffness. Left shoulder   Skin: Negative for rash. Allergic/Immunologic: Negative for environmental allergies. Neurological: Positive for tingling and loss of consciousness. Negative for dizziness, focal weakness, seizures, light-headedness, numbness and headaches. Hematological: Negative for adenopathy. Psychiatric/Behavioral: Negative for memory loss. PAST MEDICAL HISTORY         Diagnosis Date    Anxiety     Asthma     COPD (chronic obstructive pulmonary disease) ([facility] [address]     Depression     Migraines        SURGICALHISTORY     Patient  has a past surgical history that includes Tonsillectomy and adenoidectomy (1989); Ovary removal (2000); Cholecystectomy (2003); Tubal ligation (2005); Hysterectomy (2011); Dilation and curettage of uterus (2009); IGS Endo Sinus Sx (07/12/2017); and Tooth Extraction. CURRENT MEDICATIONS       Current Discharge Medication List      CONTINUE these medications which have NOT CHANGED    Details   ketorolac (TORADOL) 10 MG tablet Take 1 tablet by mouth every 8 hours as needed for Pain  Qty: 20 tablet, Refills: 0      tiZANidine (ZANAFLEX) 4 MG tablet Take 1 tablet by mouth every 8 hours as needed (neck pain)  Qty: 90 tablet, Refills: 5    Comments: Please consider 90 day supplies to promote better adherence  Associated Diagnoses: Tension headache      naproxen (NAPROSYN) 500 MG tablet Take 1 tablet by mouth 2 times daily (with meals)  Qty: 20 tablet, Refills: 0    Associated Diagnoses: Sprain of right ankle, unspecified ligament, initial encounter      gabapentin (NEURONTIN) 800 MG tablet Take 1 tablet by mouth 4 times daily for 180 days.   Qty: 120 tablet, Refills: 5    Comments: Please consider 90 day supplies to promote better adherence  Associated Diagnoses: Chronic migraine      SYMBICORT 80-4.5 MCG/ACT AERO INHALE 2 PUFFS BY MOUTH TWICE DAILY, RINSE MOUTH AFTER USE  Qty: 11 g, Refills: 11    Associated Diagnoses: Chronic bronchitis, unspecified chronic bronchitis type (HCC)      divalproex (DEPAKOTE ER) 500 MG extended release tablet TAKE 1 TABLET BY MOUTH ONCE BID  Qty: 60 tablet, Refills: 2    Comments: Please consider 90 day supplies to promote better adherence  Associated Diagnoses: Intractable migraine with aura without status migrainosus      oxybutynin (DITROPAN-XL) 10 MG extended release tablet Take 1 tablet by mouth daily  Qty: 30 tablet, Refills: 11    Comments: Please consider 90 day supplies to promote better adherence  Associated Diagnoses: OAB (overactive bladder)      albuterol sulfate HFA (VENTOLIN HFA) 108 (90 Base) MCG/ACT inhaler Inhale 1-2 puffs into the lungs every 6 hours as needed for Wheezing  Qty: 18 g, Refills: 3    Comments: Please consider 90 day supplies to promote better adherence  Associated Diagnoses: Chronic bronchitis, unspecified chronic bronchitis type (HCC)      albuterol (PROVENTIL) (2.5 MG/3ML) 0.083% nebulizer solution Take 3 mLs by nebulization every 6 hours as needed for Wheezing  Qty: 120 each, Refills: 3    Associated Diagnoses: Chronic bronchitis, unspecified chronic bronchitis type (HCC)      saline nasal gel (AYR) GEL by Nasal route as needed for Congestion  Qty: 1 Tube, Refills: 3    Associated Diagnoses: Nasal congestion      QUEtiapine (SEROQUEL) 200 MG tablet Take 1 tablet by mouth nightly  Qty: 30 tablet, Refills: 5    Comments: Please consider 90 day supplies to promote better adherence  Associated Diagnoses: Bipolar 1 disorder, mixed ([address] Dysfunctional grieving      VILAZODONE HCL 10 MG Tablet Take 1 tablet by mouth once daily  Qty: 30 tablet, Refills: 11    Associated Diagnoses:  Moderate episode of recurrent major depressive disorder (HCC)      Respiratory Therapy Supplies (NEBULIZER COMPRESSOR) KIT 1 kit by Does not apply route once for 1 dose  Qty: 1 kit, Refills: 0    Associated Diagnoses: Chronic bronchitis, unspecified chronic bronchitis type ([facility] [address]      Multiple Vitamins-Calcium (ONE-A-DAY WOMENS PO) Take by mouth daily              ALLERGIES     Patient is is allergic to botox [onabotulinumtoxina (cosmetic)]; other; topamax [topiramate]; acetaminophen; aspirin; and ibuprofen. Patients   Immunization History   Administered Date(s) Administered    Influenza, Quadv, IM, (6 mo and older Fluzone, Flulaval, Fluarix and 3 yrs and older Afluria) 11/14/2017    Influenza, Quadv, IM, PF (6 mo and older Fluzone, Flulaval, Fluarix, and 3 yrs and older Afluria) 10/16/2019    Tdap (Boostrix, Adacel) 09/19/2018       FAMILY HISTORY     Patient's family history includes Cancer in her father; Depression in her father and mother; Heart Disease in her father, maternal grandfather, and paternal grandfather; Migraines in her father and mother. SOCIAL HISTORY     Patient  reports that she has been smoking cigarettes. She has been smoking about 0.50 packs per day. She has never used smokeless tobacco. She reports that she does not drink alcohol or use drugs. PHYSICAL EXAM     ED TRIAGE VITALS  BP: 111/73, Temp: 97.2 °F (36.2 °C), Pulse: 81, Resp: 18, SpO2: 97 %,Estimated body mass index is 34.18 kg/m² as calculated from the following:    Height as of this encounter: 5' (1.524 m). Weight as of this encounter: 175 lb (79.4 kg). ,No LMP recorded. Patient has had a hysterectomy. Physical Exam  Vitals signs and nursing note reviewed. Constitutional:       General: She is not in acute distress. Appearance: Normal appearance. She is well-developed and well-groomed. She is not ill-appearing, toxic-appearing or diaphoretic. HENT:      Head: Normocephalic. No raccoon eyes, Art's sign, contusion or laceration. Jaw: No trismus or tenderness. Comments: No visible signs of trauma noted to the head.      Right Ear: Hearing, tympanic membrane, ear canal and external ear normal. No drainage, swelling or tenderness. No mastoid tenderness. No hemotympanum. Tympanic membrane is not erythematous. Left Ear: Hearing, tympanic membrane, ear canal and external ear normal. No drainage, swelling or tenderness. No mastoid tenderness. No hemotympanum. Tympanic membrane is not erythematous. Nose: Nose normal. No signs of injury. Right Nostril: No epistaxis. Left Nostril: No epistaxis. Right Sinus: No maxillary sinus tenderness or frontal sinus tenderness. Left Sinus: No maxillary sinus tenderness or frontal sinus tenderness. Mouth/Throat:      Lips: Pink. Mouth: Mucous membranes are moist. No injury or lacerations. Pharynx: Uvula midline. No posterior oropharyngeal erythema or uvula swelling. Tonsils: No tonsillar exudate or tonsillar abscesses. Eyes:      Conjunctiva/sclera: Conjunctivae normal.      Pupils: Pupils are equal, round, and reactive to light. Neck:      Musculoskeletal: Normal range of motion. Pain with movement and muscular tenderness present. No neck rigidity, injury or spinous process tenderness. Cardiovascular:      Rate and Rhythm: Normal rate and regular rhythm. Heart sounds: Normal heart sounds. Pulmonary:      Effort: Pulmonary effort is normal. No accessory muscle usage. Breath sounds: Normal breath sounds and air entry. No decreased breath sounds, wheezing, rhonchi or rales. Abdominal:      General: Abdomen is flat. Bowel sounds are normal.      Palpations: Abdomen is soft. Tenderness: There is no abdominal tenderness. There is no right CVA tenderness, left CVA tenderness or guarding. Negative signs include Murdock's sign. Musculoskeletal:      Cervical back: She exhibits tenderness and pain. She exhibits normal range of motion, no bony tenderness, no swelling, no edema, no deformity and no spasm. Comments: Patient does complain of pain to the left glenohumeral joint and trapezius muscle.   There is no signs of bruising or without Calcium   Result Value Ref Range    Sodium 141 138 - 146 meq/l    Potassium, Whole Blood 3.9 3.5 - 4.9 meq/l    Chloride, Whole Blood 102 98 - 109 meq/l    TOTAL CO2, WHOLE BLOOD 29 23 - 33 meq/l    Glucose, Whole Blood 77 70 - 108 mg/dl    BUN, WHOLE BLOOD 7 (L) 8 - 26 mg/dl    CREATININE 0.8 0.5 - 1.2 mg/dl   Glomerular Filtration Rate, Estimated   Result Value Ref Range    GFR, Estimated 85 ml/min/1.73m2   EKG 12 Lead   Result Value Ref Range    Ventricular Rate 61 BPM    Atrial Rate 61 BPM    P-R Interval 140 ms    QRS Duration 86 ms    Q-T Interval 414 ms    QTc Calculation (Bazett) 416 ms    P Axis 52 degrees    R Axis 41 degrees    T Axis 58 degrees       IMAGING:    XR SHOULDER LEFT (MIN 2 VIEWS)    (Results Pending)   CT HEAD WO CONTRAST    (Results Pending)         EKG:      URGENT CARE COURSE:     Vitals:    12/15/20 1635   BP: 111/73   Pulse: 81   Resp: 18   Temp: 97.2 °F (36.2 °C)   TempSrc: Temporal   SpO2: 97%   Weight: 175 lb (79.4 kg)   Height: 5' (1.524 m)       Medications - No data to display         PROCEDURES:  None    FINAL IMPRESSION      1. Syncope, unspecified syncope type    2. Acute head injury with loss of consciousness but no other complication, initial encounter ([facility] [address]    3. Left shoulder strain, initial encounter    4. Injury while showering          DISPOSITION/ PLAN        Rest is the best treatment. Get plenty of sleep at night. And try to rest during the day. If you would find it harder to think, concentrate, remember or solve problems. You  may also develop persistent headaches, have changes in your sleep patterns such as not being able to sleep or sleeping all the time. I did talk with patient regarding this could also be a grief reaction or medication reaction that she is taking. 1.Headache    2. Dizziness   3. Loss of consciousness   4. Short term memory loss   5. Dilated pupils   6. Visual impairment   7. Convulsions    8. Nausea and vomiting    9. Speech and language problems    10. Emotional and behavioral changes       Go slowly from lying sitting and standing because you may become dizzy,lightheaded,or unsteady. The following activities that exacerbate symptoms Avoid activities that are physically or mentally demanding. These include housework, exercise, and schoolwork. And don't play video games, send text messages, or use the computer. Do not drink alcohol or use illegal drugs. Take an over-the-counter pain medicine, such as acetaminophen (Tylenol), ibuprofen (Advil, Motrin), or naproxen (Aleve). Be safe with medicines. l.      You need to down 911 or you need to be reevaluated in the emergency department. Follow up with your primary care provider in the next 24-48 hours for reevaluation.     PATIENT REFERRED TO:  EVA Walsh CNP  [address] [place] [address]      DISCHARGE MEDICATIONS:  Current Discharge Medication List          Current Discharge Medication List          Current Discharge Medication List          EVA Floyd CNP    (Please note that portions of this note were completed with a voice recognition program. Efforts were made to edit the dictations but occasionally words are mis-transcribed.)           EVA Floyd CNP  12/15/20 0489

## 2020-12-15 NOTE — ED NOTES
Patient verbalized understanding of discharge instructions and medications prescribed. Denies questions or concerns at this time .      Mendel Arab, RN  12/15/20 6351

## 2020-12-15 NOTE — ED TRIAGE NOTES
Patient fell in the shower yesterday headfirst to her left side hitting her left cheek, jaw shoulder and upper arm .

## 2020-12-16 ENCOUNTER — TELEPHONE (OUTPATIENT)
Dept: FAMILY MEDICINE CLINIC | Age: 40
End: 2020-12-16

## 2020-12-16 ENCOUNTER — CARE COORDINATION (OUTPATIENT)
Dept: CARE COORDINATION | Age: 40
End: 2020-12-16

## 2020-12-16 LAB
BASOPHILS # BLD: 0.4 %
BASOPHILS ABSOLUTE: 0 THOU/MM3 (ref 0–0.1)
EOSINOPHIL # BLD: 3.3 %
EOSINOPHILS ABSOLUTE: 0.3 THOU/MM3 (ref 0–0.4)
IMMATURE GRANS (ABS): 0.04 THOU/MM3 (ref 0–0.07)
IMMATURE GRANULOCYTES: 0.4 %
LYMPHOCYTES # BLD: 33.5 %
LYMPHOCYTES ABSOLUTE: 3.2 THOU/MM3 (ref 1–4.8)
MONOCYTES # BLD: 11.3 %
MONOCYTES ABSOLUTE: 1.1 THOU/MM3 (ref 0.4–1.3)
NUCLEATED RED BLOOD CELLS: 0 /100 WBC
SEG NEUTROPHILS: 51.1 %
SEGMENTED NEUTROPHILS ABSOLUTE COUNT: 4.9 THOU/MM3 (ref 1.8–7.7)

## 2020-12-16 ASSESSMENT — ENCOUNTER SYMPTOMS: DYSPNEA ASSOCIATED WITH: EXERTION

## 2020-12-16 NOTE — TELEPHONE ENCOUNTER
I agree with the Care Coordinator's Plan of Care. Agree it is related to intense stress she is going though and trauma she has experience.   Yes recommend VV if she would like

## 2020-12-16 NOTE — CARE COORDINATION
Pt was seen at [facility] yesterday after having syncopal episode leading to fall while in shower. Pt reports episodes have been occurring since recent loss of spouse. Aware that it could be d/t everything she has went through but is Concerned that it may be something else. Pt reports that she will \"zone\" out when talking to people and not be able to recall events or conversation. Pt would like to discuss with you. Starts new job tomorrow and will be working nights. Agreeable in doing VV if you recommend one. Please advise.

## 2020-12-16 NOTE — CARE COORDINATION
Ambulatory Care Coordination Note  12/16/2020  CM Risk Score: 2  Charlson 10 Year Mortality Risk Score: 4%     ACC: Ruby Mejia, RN    Summary Note: Jennifer Garay was referred to care coordination following an  visit for syncope/fall. Spoke with Jennifer Garay today. Introduced self and role. Agreeable to f/u calls. Pt has been going through tremendous amt of grief after losing her spouse in November. Pt tearful t/o phone conversation today. Pt has been working with behavioral health SW.  Reports that this has been very helpful. Talking with her weekly. Encouraged to continue. Next appt is on 12/21. Pt admits that she isn't sleeping well. Having difficult time concentrating as she reports her mind constantly goes to the events leading up to her husbands death. Provided support. Pt worried about syncopal episodes that have been occurring. Last one caused occurred in shower causing her to fall and hit her head. Reports that she is eating and drinking. Very emotionally drained. Under significant amt of stress. Aware that these symptoms could be d/t everything that she has been through recently but concerned that something else may be causing them. Also wanting to know what she can do to help prevent them from occurring if it's due to everything she has been through. Pt shares that she is no longer taking Viibrid or Depakote. States that she stopped them back in Oct.  Did not feel they were helping her much then. Informed pt that those meds should not be stopped abruptly. Verbalizes understanding. Has no plans at this time of restarting them. COPD: reports that she has been using her rescue inhaler more often. Recently starting using her nebulizer when at home. States \"I get so worked up when I am crying that I start to hyperventilate. \"  Using Symbicort twice daily as prescribed and is rinsing mouth after each use. Still smoking. Was trying to quit when her  got sick. Still wanting to quit. Advised pt to not to overwhelmed with doing so right now given everything she has just been through. Starting new job at [employer]. Will be working 5pm-330am.  Staying with her sister and her brother. Worried about her children and how they are grieving. Suggested they talk to counselor as well. Reports that counselor from school will be working with her son and shared that her daughter is planning on following with [address] [place] [address]:  Message sent to Consuelo Hunt re: pt's continued concerns of syncopal episodes occurring. Agreeable with VV if recommended. Continue working with [address]. Not interested in following with [facility]. Has in past.   Try to stay busy t/o the day. Try to eat t/o the day even if it's small amts. Drink adequate amts of fluids  Utilize tools that Geron has provided.    Call with any new questions or concerns    COPD Assessment    Does the patient understand envrionmental exposure?: Yes  Is the patient able to verbalize Rescue vs. Long Acting medications?: Yes  Does the patient have a nebulizer?: Yes  Does the patient use a space with inhaled medications?: No            Symptoms:     Symptom course: stable  Breathlessness: exertion  Increase use of rapid acting/rescue inhaled medications?: Yes  Change in chronic cough?: Increased  Change in sputum?: No/At Baseline  Self Monitoring - SaO2: No  Have you had a recent diagnosis of pneumonia either by PCP or at a hospital?: No      and   General Assessment    Do you have any symptoms that are causing concern?: Yes  Progression since Onset: Unchanged  Reported Symptoms:  (Comment: syncopal episodes. )               Ambulatory Care Coordination Assessment    Care Coordination Protocol  Program Enrollment: Complex Care  Referral from Primary Care Provider: No  Week 1 - Initial Assessment     Do you have all of your prescriptions and are they filled?: No  Barriers to medication adherence: None  Are you able to afford your medications?: Yes  How often do you have trouble taking your medications the way you have been told to take them?: Sometimes I take them as prescribed. Do you have Home O2 Therapy?: No      Ability to seek help/take action for Emergent Urgent situations i.e. fire, crime, inclement weather or health crisis. : Independent  Ability to ambulate to restroom: Independent  Ability handle personal hygeine needs (bathing/dressing/grooming): Independent  Ability to manage Medications: Independent  Ability to prepare Food Preparation: Independent  Ability to maintain home (clean home, laundry): Independent  Ability to drive and/or has transportation: Independent  Ability to do shopping: Independent  Ability to manage finances: Independent  Is patient able to live independently?: Yes     Current Housing: Private Residence        Per the Fall Risk Screening, did the patient have 2 or more falls or 1 fall with injury in the past year?: No           Thinking about your patient's physical health needs, are there any symptoms or problems (risk indicators) you are unsure about that require further investigation?: No identified areas of uncertainly or problems already being investigated   Are the patients physical health problems impacting on their mental well-being?: No identified areas of concern   Are there any problems with your patients lifestyle behaviors (alcohol, drugs, diet, exercise) that are impacting on physical or mental well-being?: No identified areas of concern   Do you have any other concerns about your patients mental well-being?  How would you rate their severity and impact on the patient?: No identified areas of concern   How would you rate their home environment in terms of safety and stability (including domestic violence, insecure housing, neighbor harassment)?: Consistently safe, supportive, stable, no identified problems   How do daily activities impact on the patient's well-being? (include current or anticipated unemployment, work, caregiving, access to transportation or other): No identified problems or perceived positive benefits   How would you rate their social network (family, work, friends)?: Good participation with social networks   How would you rate their financial resources (including ability to afford all required medical care)?: Financially secure, resources adequate, no identified problems   How wells does the patient now understand their health and well-being (symptoms, signs or risk factors) and what they need to do to manage their health?: Reasonable to good understanding and already engages in managing health or is willing to undertake better management   How well do you think your patient can engage in healthcare discussions? (Barriers include language, deafness, aphasia, alcohol or drug problems, learning difficulties, concentration): Clear and open communication, no identified barriers   Do other services need to be involved to help this patient?: Other care/services not required at this time   Suggested Interventions and [address]: Declined (Comment: used to follow with Eben Moore. declines f/.u with them again)     Grief/ Bereavement Counselor: Completed   Transportation Services: In Process         Set up/Review an Education Plan, Set up/Review Goals              Prior to Admission medications    Medication Sig Start Date End Date Taking? Authorizing Provider   albuterol (PROVENTIL) (2.5 MG/3ML) 0.083% nebulizer solution Take 3 mLs by nebulization every 6 hours as needed for Wheezing 12/7/20  Yes EVA Richard CNP   tiZANidine (ZANAFLEX) 4 MG tablet Take 1 tablet by mouth every 8 hours as needed (neck pain) 12/7/20  Yes EVA Richard - CNP   gabapentin (NEURONTIN) 800 MG tablet Take 1 tablet by mouth 4 times daily for 180 days.  12/7/20 6/5/21 Yes EVA Richard - CNP   SYMBICORT 80-4.5 MCG/ACT AERO INHALE 2

## 2020-12-17 ENCOUNTER — VIRTUAL VISIT (OUTPATIENT)
Dept: FAMILY MEDICINE CLINIC | Age: 40
End: 2020-12-17
Payer: MEDICARE

## 2020-12-17 ENCOUNTER — TELEPHONE (OUTPATIENT)
Dept: FAMILY MEDICINE CLINIC | Age: 40
End: 2020-12-17

## 2020-12-17 PROCEDURE — G8428 CUR MEDS NOT DOCUMENT: HCPCS | Performed by: NURSE PRACTITIONER

## 2020-12-17 PROCEDURE — 99214 OFFICE O/P EST MOD 30 MIN: CPT | Performed by: NURSE PRACTITIONER

## 2020-12-17 RX ORDER — CLONAZEPAM 0.5 MG/1
0.5 TABLET ORAL 2 TIMES DAILY PRN
Qty: 30 TABLET | Refills: 0 | Status: SHIPPED | OUTPATIENT
Start: 2020-12-17 | End: 2020-12-23 | Stop reason: SDUPTHER

## 2020-12-17 ASSESSMENT — ENCOUNTER SYMPTOMS
COUGH: 0
ABDOMINAL PAIN: 0
NAUSEA: 0
SHORTNESS OF BREATH: 0

## 2020-12-17 NOTE — PROGRESS NOTES
Subjective:      Patient ID: Prasanna Carrasco is a 45 y.o. female    HPI: Acute for syncope    TELEHEALTH EVALUATION -- Audio/Visual (During MINAU-59 public health emergency)    Services were provided through a video synchronous discussion virtually to substitute for in-person clinic visit. Patient and provider were located at their individual homes. Patient has requested an audio/video evaluation for the following concern(s):    Chief Complaint   Patient presents with    Loss of Consciousness       Was seen in  for syncope in shower. Hit head and shoulder. EKG NSR at that time. Has had multiple episodes of lightheadedness. Zoning out at times during conversation or driving. SOB. Chronic lung issues. Zoning out spells do not happen while on benzodiazepine.  passed away 11/12/20 - was in the hospital for 1+ month after a heart attack that resulted in multiple complications and his subsequent passing    Patient is relieving every moment and every day he was in the hospital.  Second guessing herself on getting him to the hospital sooner. Struggling with the after math. Anxious and depressed. Sleeping in poor. underlying Bipolar Depression. On Viibryd 10 mg and Seroquel 200 mg and Xanax PRN. These were adjusted 2 weeks ago. Benefit seen. Has a good support system. Staying with her family currently. Still working. FOllowing with counselor. Patient Active Problem List   Diagnosis    Smoking addiction    COPD (chronic obstructive pulmonary disease) ([facility] [address] [facility]    ANTHONY (generalized anxiety disorder)    Chronic pansinusitis    Bilateral occipital neuralgia    Cervicogenic headache    Chronic migraine without aura without status migrainosus, not intractable       Review of Systems   Constitutional: Negative for chills and fever. HENT: Negative. Respiratory: Negative for cough and shortness of breath. Cardiovascular: Negative for chest pain.    Gastrointestinal: Negative for abdominal pain and nausea. Skin: Negative for rash. Neurological: Positive for syncope and light-headedness. Negative for dizziness and headaches. Psychiatric/Behavioral: Positive for decreased concentration, dysphoric mood and sleep disturbance. Negative for self-injury and suicidal ideas. The patient is nervous/anxious. Objective:   Physical Exam  Constitutional:       General: She is not in acute distress. Appearance: She is not ill-appearing. Pulmonary:      Effort: Pulmonary effort is normal. No respiratory distress. Neurological:      Mental Status: She is alert and oriented to person, place, and time. Psychiatric:         Mood and Affect: Mood is anxious and depressed. Speech: Speech normal.         Behavior: Behavior is slowed and withdrawn. Thought Content: Thought content normal.         Cognition and Memory: Cognition normal.         Judgment: Judgment is impulsive. Assessment:       Diagnosis Orders   1. Syncope, unspecified syncope type  ECHO Complete 2D W Doppler W Color   2. Seizure-like activity (HCC)  ECHO Complete 2D W Doppler W Color    EEG   3. Trauma and stressor-related disorder  clonazePAM (KLONOPIN) 0.5 MG tablet    ECHO Complete 2D W Doppler W Color   4. Dysfunctional grieving     5. 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   Pursuant to the emergency declaration under the [facility], [address] [place] [address] and the [employer] Act, this Virtual

## 2020-12-17 NOTE — TELEPHONE ENCOUNTER
Pt informed and scheduled at [address] [place] [address] on 12/28/2020 arrival 7:30 am to the second floor heart center for 8:00 am ECHO followed by a EEG. Pt aware that office will call with results once received. Orders e-mailed to [facility] department.

## 2020-12-21 ENCOUNTER — VIRTUAL VISIT (OUTPATIENT)
Dept: BEHAVIORAL/MENTAL HEALTH CLINIC | Age: 40
End: 2020-12-21
Payer: MEDICARE

## 2020-12-21 PROCEDURE — 90832 PSYTX W PT 30 MINUTES: CPT | Performed by: SOCIAL WORKER

## 2020-12-21 PROCEDURE — 4004F PT TOBACCO SCREEN RCVD TLK: CPT | Performed by: SOCIAL WORKER

## 2020-12-21 NOTE — PROGRESS NOTES
Behavioral Health Consultation  TOSHA Duff-S  12/21/2020  8:35 AM EDT  This note will not be viewable in [employer] for the following reason(s). This is a Psychotherapy Note. Time spent with Patient: 25 minutes  This is patient's sixth [facility] appointment. Reason for Consult:    Chief Complaint   Patient presents with    Other     grief reaction    Anxiety    Depression     Referring Provider: ABEBE Murray  Pt provided informed consent for the behavioral health program. Discussed with patient model of service to include the limits of confidentiality (i.e. abuse reporting, suicide intervention, etc.) and short-term intervention focused approach. Pt indicated understanding. Feedback given to PCP. [facility]  [facility]  Dept: [phone]  Dept Fax: [phone]    TELEHEALTH EVALUATION -- Audio/Visual (During TVNET-32 public health emergency)  This visit was performed via a synchronous telecommunication system. Pt location: [facility]  Provider location: Home [facility] ([address] [place] [address])  Pt notified that this was a virtual visit and that we will submit a claim for reimbursement to their insurance company. They were made aware that any copays, coinsurance amounts, or other amounts not covered will be their responsibility. Pt accepted?  yes  1  Pursuant to the emergency declaration under the [facility], [facility]5 waiver authority and the [employer] and [employer] Act, this Virtual  Visit was conducted, with patient's consent, to reduce the patient's risk of exposure to COVID-19 and provide continuity of care for an established patient. Services were provided through a video synchronous discussion virtually to substitute for in-person clinic visit. S:  Pt assessed that her symptoms of grief, anxiety, and depression remain steady.   She evaluated coping skills and resources that have been most helpful in getting through the days. Pt discussed having made several changes in a new job and living arrangements. They have offered a sense of support and new environment, different than her home and job that triggered grief reactions. She identified some new concerns with her health of possibilities of having seizures. Pt acknowledged things that have helped in continuing to move forward and discussed her motivation as her children. Thoughts of guilt were processed in evaluating the logical reasoning to reframe them. Pt was advised of indications of depressive symptoms and instructed to monitor if symptoms worsen or interfere with daily functioning, to consider following-up with either your primary care team or a behavioral health provider for possible counseling or medication management. If suicidal thoughts are experienced, call 911. An additional 24/7 resource is the [employer] at 9-643-890-ZLUV ([phone]). Pt will attend a follow up appointment via [employer] in two weeks. O:  MSE:    Appearance    calm  Appetite normal  Sleep disturbance Yes  Fatigue Yes  Loss of pleasure Yes  Impulsive behavior No  Speech    normal rate  Mood    Depressed  Affect    normal affect  Thought Content    intact  Thought Process    coherent  Associations    logical connections  Insight    Fair  Judgment    Intact  Orientation    oriented to person, place, time, and general circumstances  Memory    recent and remote memory intact  Attention/Concentration    intact  Morbid ideation No  Suicide Assessment    no suicidal ideation; fleeting thoughts of being better off dead, no intention or desire. Strong protective factors of responsibility to her family. History:    TOBACCO:   reports that she has been smoking cigarettes. She has been smoking about 0.50 packs per day. She has never used smokeless tobacco.  ETOH:   reports no history of alcohol use.   Family History: Family History   Problem Relation Age of Onset    Depression Mother    Ikesinancy Mistry Migraines Mother     Cancer Father         Prostate and Testicular Cancer    Depression Father     Heart Disease Father     Migraines Father     Heart Disease Maternal Grandfather     Heart Disease Paternal Grandfather        A:     Diagnosis:    1. Grief reaction    2. ANTHONY (generalized anxiety disorder)    3. Severe episode of recurrent major depressive disorder, without psychotic features ([facility] [address]          Diagnosis Date    Anxiety     Asthma     [facility]     Depression     Migraines        Plan: Pt will attend a follow up appointment in two weeks  to assess symptoms and evaluate effectiveness of coping skills. Pt interventions:  Reviewed information about deep breathing, grief, and self care. Pt Behavioral Change Plan:     1. Pt will practice deep breathing exercises 2-3x's daily for 3-5 minutes. 2. Pt will familiarize self with [facility]. 3. Pt will promote effective self care management daily-sleep, increased activities, supportive relationships, relaxation. 4. Pt was advised of indications of depressive symptoms and instructed to monitor if symptoms worsen or interfere with daily functioning, to consider following-up with either your primary care team or a behavioral health provider for possible counseling or medication management. If suicidal thoughts are experienced, call 911. An additional 24/7 resource is the [employer] at 4-717-598-TYTZ ([phone]). 5. Pt will attend a follow up appointment in two weeks to assess symptoms and evaluate effectiveness of coping skills.

## 2020-12-23 ENCOUNTER — CARE COORDINATION (OUTPATIENT)
Dept: CARE COORDINATION | Age: 40
End: 2020-12-23

## 2020-12-23 RX ORDER — CLONAZEPAM 1 MG/1
1 TABLET ORAL 2 TIMES DAILY PRN
Qty: 30 TABLET | Refills: 0 | Status: SHIPPED | OUTPATIENT
Start: 2020-12-23 | End: 2021-01-04 | Stop reason: SDUPTHER

## 2020-12-23 ASSESSMENT — ENCOUNTER SYMPTOMS: DYSPNEA ASSOCIATED WITH: EXERTION

## 2020-12-23 NOTE — CARE COORDINATION
Pt was prescribed Klonapin 0.5mg twice daily as needed at last visit on 12/17 in place of Xanax. Pt has been taking and reports improvement although states that she has had to take 2 tabs (1mg) to be completely effective. Currently taking twice daily as anxiety continues to be extremely high. If dose can be increased pt will need new script sent to [facility]. Please advise.

## 2020-12-23 NOTE — TELEPHONE ENCOUNTER
Pt notified new script reflecting increased dose for Klonapin sent to [address] in [facility]. Pt verbalizes understanding.

## 2020-12-23 NOTE — CARE COORDINATION
Ambulatory Care Coordination Note  12/23/2020  CM Risk Score: 2  Charlson 10 Year Mortality Risk Score: 4%     ACC: Ron Solares, KENNEY    Summary Note: Lokesh Sandoval is being followed by care coordination for education and assistance in managing her chronic conditions. Spoke with Lokesh Sandoval today. Pt grieving over loss of  last mth. Staying with family. Reports that she has good support system. Started new job at [email or web address]. 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 Reports that she is very busy at work which has helped to keep her mind off things. Not driving as she has found herself zoning out when in car and does not feel safe. Family providing transport. Pt also has transportation through [employer] if needed for office visits. Had VV with PCP on 12/17. Xanax switched to [address] Pt reports that [address] has helped her with her anxiety but if she takes 0.5mg as prescribed she reports that it isn't effective. Pt has taken 1mg and feels this dose is more effective. States \"I don't go to that deep dark place if I take 1mg. \"  Will send message to PCP regarding this. Pt currently having to take twice daily. Still struggling with sleep. Only taking 50mg of Seroquel as she is fearful of taking 200mg. May try if sleep continues to be an issue. Continues to work with Zach [address].  Encouraged to continue. Had syncopal episode recently. Concerned that may be underlying cause for this other than her grief. PCP has ordered EEG and echo to r/o. Aware that office will call once results are in. COPD: reports breathing is at baseline. Denies cough or congestion. Plan of care:  Continue working with Arvind Jimena Pichardo.   Discussed spiritual care referral. Pt declined at present time but may be interested in future. Encouraged continued f/u with PCP  Continue following COVID precautions  Have testing completed on 12/28 as scheduled  Call with any new concerns or questions.    COPD Assessment    Does the patient understand envrionmental exposure?: Yes  Is the patient able to verbalize Rescue vs. Long Acting medications?: Yes  Does the patient have a nebulizer?: Yes  Does the patient use a space with inhaled medications?: No            Symptoms:  None: Yes      Symptom course: stable  Breathlessness: exertion  Increase use of rapid acting/rescue inhaled medications?: No  Change in chronic cough?: No/At Baseline  Change in sputum?: No/At Baseline  Self Monitoring - SaO2: No  Have you had a recent diagnosis of pneumonia either by PCP or at a hospital?: No      and   General Assessment    Do you have any symptoms that are causing concern?: Yes  Progression since Onset: Gradually Improving  Reported Symptoms:  (Comment: anxiety. recently started on Klonapin. reports that medication is helping although she has found that 0.5mg is not effective. has taken 1mg and and has found that her anxiety is better controlled on that dose. message sent to PCP to provide update.)                   Care Coordination Interventions    Program Enrollment: Complex Care  Referral from Primary Care Provider: No  Suggested Interventions and [facility] [address] [address]: Declined (Comment: used to follow with Baby [facility]. declines f/.u with them again)  Grief Counselor: Completed (Comment: following with [facility] Landa)  Transportation Support: Completed         Goals Addressed    None         Prior to Admission medications    Medication Sig Start Date End Date Taking? Authorizing Provider   clonazePAM (KLONOPIN) 0.5 MG tablet Take 1 tablet by mouth 2 times daily as needed for Anxiety for up to 15 days.  12/17/20 1/1/21  EVA Hartley CNP   ketorolac (TORADOL) 10 MG tablet Take 1 tablet by mouth every 8 hours as needed for Pain 12/9/20   EVA Hartley - CNP   albuterol (PROVENTIL) (2.5 MG/3ML) 0.083% nebulizer solution Take 3 mLs by nebulization every 6 hours as needed for Wheezing 12/7/20   Aysha Garzon

## 2020-12-28 ENCOUNTER — HOSPITAL ENCOUNTER (OUTPATIENT)
Dept: NON INVASIVE DIAGNOSTICS | Age: 40
Discharge: HOME | End: 2020-12-28
Payer: MEDICARE

## 2020-12-28 ENCOUNTER — HOSPITAL ENCOUNTER (OUTPATIENT)
Dept: NEUROLOGY | Age: 40
Discharge: HOME | End: 2020-12-28
Payer: MEDICARE

## 2020-12-28 LAB
LV EF: 55 %
LVEF MODALITY: NORMAL

## 2020-12-28 PROCEDURE — 95816 EEG AWAKE AND DROWSY: CPT

## 2020-12-28 PROCEDURE — 95816 EEG AWAKE AND DROWSY: CPT | Performed by: PSYCHIATRY & NEUROLOGY

## 2020-12-28 PROCEDURE — 93306 TTE W/DOPPLER COMPLETE: CPT

## 2020-12-28 NOTE — PROGRESS NOTES
[facility] Technician worksheet       EEG Date: 2020    Name: Jemal Martinez   : 1980   Age: 45 y.o. SEX: female    Room: OP    MRN: [id number]     CSN: [id number]    Ordering Provider: Karie Smith  EEG Number: [id number] Time of Test:  0915    Hand: Right   Sedation: No    H.V. Done: Yes with good effort Photic: Yes    Sleep: No   Drowsy: Yes   Sleep Deprived: No    Seizures observed: no    Mentality: alert       Clinical History: pt stated been having seizure like events, most recent was last week passed out in shower    Past Medical History:       Diagnosis Date    Anxiety     Asthma     [facility] disease) [facility] [address] [facility]     Depression     Migraines          Prior to Admission medications    Medication Sig Start Date End Date Taking? Authorizing Provider   clonazePAM (KLONOPIN) 1 MG tablet Take 1 tablet by mouth 2 times daily as needed for Anxiety for up to 15 days. 20  EVA Harry CNP   ketorolac (TORADOL) 10 MG tablet Take 1 tablet by mouth every 8 hours as needed for Pain 20   EVA Harry CNP   albuterol (PROVENTIL) (2.5 MG/3ML) 0.083% nebulizer solution Take 3 mLs by nebulization every 6 hours as needed for Wheezing 20   EVA Harry CNP   saline nasal gel (AYR) GEL by Nasal route as needed for Congestion 20   EVA Harry CNP   tiZANidine (ZANAFLEX) 4 MG tablet Take 1 tablet by mouth every 8 hours as needed (neck pain) 20   EVA Harry CNP   naproxen (NAPROSYN) 500 MG tablet Take 1 tablet by mouth 2 times daily (with meals) 20   EVA Harry CNP   gabapentin (NEURONTIN) 800 MG tablet Take 1 tablet by mouth 4 times daily for 180 days.  20  EVA Harry CNP   QUEtiapine (SEROQUEL) 200 MG tablet Take 1 tablet by mouth nightly  Patient not taking: Reported on 2020   EVA Harry CNP

## 2020-12-29 NOTE — PROCEDURES
[address] [id number]                          ELECTROENCEPHALOGRAM REPORT    PATIENT NAME: Jihan Ro                 :        1980  MED REC NO:   [id number]                           ROOM:  ACCOUNT NO:   [de-identified]                           ADMIT DATE: 2020  PROVIDER:     Aleksandra Arriaga. Ruchi Shaver MD    DATE OF EE2020    REFERRING PROVIDER:  Carola Medina CNP    CLINICAL HISTORY:  A 42-year-old female presenting with seizure-like  event, passed out in the shower. PAST MEDICAL HISTORY:  Significant for anxiety, asthma, COPD,  depression, migraine. Medications listed are Klonopin, Toradol,  Proventil, Zanaflex, Naprosyn, gabapentin, Seroquel, vilazodone,  Symbicort, Depakote, Ditropan, Ventolin, nebulizer kit, calcium  supplement. FINDINGS:  This is a 17-channel EEG performed without sleep deprivation. Hyperventilation was performed. Photic stimulation was performed. The  patient is described as alert. Background rhythm activity is noted to be 10 to 11 Hz in the posterior  parietal area, symmetric, well modulated, attenuates with eye opening. The patient is noted to be drowsy during parts of recording. Hyperventilation was performed for 3 minutes with good effort without  abnormality. Lead artifact was noted. Photic stimulation was performed  with driving seen through some of the frequencies. There was no  evidence of epileptiform activity appreciated. IMPRESSION:  This is a normal EEG. There was no evidence of  epileptiform activity appreciated.         Nelda Miranda MD    D: 2020 20:32:43       T: 2020 20:36:23     JUAN [email or web address]  Job#: [id number]     Doc#: [id number]    CC:

## 2021-01-04 ENCOUNTER — VIRTUAL VISIT (OUTPATIENT)
Dept: BEHAVIORAL/MENTAL HEALTH CLINIC | Age: 41
End: 2021-01-04
Payer: MEDICARE

## 2021-01-04 DIAGNOSIS — F43.21 GRIEF REACTION: Primary | ICD-10-CM

## 2021-01-04 DIAGNOSIS — F33.2 SEVERE EPISODE OF RECURRENT MAJOR DEPRESSIVE DISORDER, WITHOUT PSYCHOTIC FEATURES (HCC): ICD-10-CM

## 2021-01-04 DIAGNOSIS — F41.1 GAD (GENERALIZED ANXIETY DISORDER): ICD-10-CM

## 2021-01-04 DIAGNOSIS — F43.9 TRAUMA AND STRESSOR-RELATED DISORDER: ICD-10-CM

## 2021-01-04 PROCEDURE — 90834 PSYTX W PT 45 MINUTES: CPT | Performed by: SOCIAL WORKER

## 2021-01-04 PROCEDURE — 4004F PT TOBACCO SCREEN RCVD TLK: CPT | Performed by: SOCIAL WORKER

## 2021-01-04 RX ORDER — CLONAZEPAM 1 MG/1
1 TABLET ORAL 2 TIMES DAILY PRN
Qty: 30 TABLET | Refills: 0 | Status: SHIPPED | OUTPATIENT
Start: 2021-01-04 | End: 2021-02-18 | Stop reason: SDUPTHER

## 2021-01-04 RX ORDER — KETOROLAC TROMETHAMINE 10 MG/1
10 TABLET, FILM COATED ORAL EVERY 8 HOURS PRN
Qty: 20 TABLET | Refills: 0 | Status: SHIPPED | OUTPATIENT
Start: 2021-01-04 | End: 2021-02-06 | Stop reason: SDUPTHER

## 2021-01-04 NOTE — PROGRESS NOTES
Behavioral Health Consultation  Keshia Singer TOSHA-S  1/4/2021  8:35 AM EDT  This note will not be viewable in [employer] for the following reason(s). This is a Psychotherapy Note. Time spent with Patient: 38 minutes  This is patient's seventh [facility] appointment. Reason for Consult:    Chief Complaint   Patient presents with    Depression    Other     grief reaction    Anxiety     Referring Provider: ABEBE Bettencourt  Pt provided informed consent for the behavioral health program. Discussed with patient model of service to include the limits of confidentiality (i.e. abuse reporting, suicide intervention, etc.) and short-term intervention focused approach. Pt indicated understanding. Feedback given to PCP. [facility]  [facility]  Dept: [phone]  Dept Fax: [phone]    TELEHEALTH EVALUATION -- Audio/Visual (During UVIVR-04 public health emergency)  This visit was performed via a synchronous telecommunication system. Pt location: [facility]  Provider location: [facility] ([address] [place] [address])  Pt notified that this was a virtual visit and that we will submit a claim for reimbursement to their insurance company. They were made aware that any copays, coinsurance amounts, or other amounts not covered will be their responsibility. Pt accepted?  yes  1  Pursuant to the emergency declaration under the [facility], 1135 waiver authority and the [employer] Act, this Virtual  Visit was conducted, with patient's consent, to reduce the patient's risk of exposure to COVID-19 and provide continuity of care for an established patient. Services were provided through a video synchronous discussion virtually to substitute for in-person clinic visit. S:  Pt assessed that her symptoms of grief, anxiety, and depression remain steady and unchanged.   She reports getting about two hours of sleep nightly and is often awakened by nightmares. Pt discussed that her medication has been ineffective and she was referred for  intensive outpatient services by her PCP. She acknowledged having a history of working with [employer], but she is currently residing with her brother in [facility]. Pt was provided information about [facility]. Pt processed daily events that have served as grief reminders and/or trigger anxiety. She acknowledged that returning to work has helped in occupying her mind and distracting from her thoughts. Pt reports a sense of hopelessness experienced daily, but she feels an obligation to her children to promote healing from her pain and resiliency. She examined her priorities in what this would look like daily in getting up, going to work, and permitting herself to feel deyvi/comfort free of guilt. Pt was advised of indications of depressive symptoms and instructed to monitor if symptoms worsen or interfere with daily functioning, to consider following-up with either your primary care team or a behavioral health provider for possible counseling or medication management. If suicidal thoughts are experienced, call 911. An additional 24/7 resource is the [employer] at 6-673-030-MNMK (1759). Pt will attend a follow up appointment next week and will make contact with referred agencies for intensive outpatient services.       O:  MSE:    Appearance    crying  Appetite normal  Sleep disturbance Yes  Fatigue Yes  Loss of pleasure Yes  Impulsive behavior No  Speech    normal rate  Mood    Depressed  Affect    normal affect  Thought Content    intact  Thought Process    coherent  Associations    logical connections  Insight    Fair  Judgment    Intact  Orientation    oriented to person, place, time, and general circumstances  Memory    recent and remote memory intact  Attention/Concentration    intact  Morbid ideation No  Suicide Assessment    no suicidal ideation; fleeting thoughts of being better off dead, no intention or desire. Strong protective factors of responsibility to her family. History:    TOBACCO:   reports that she has been smoking cigarettes. She has been smoking about 0.50 packs per day. She has never used smokeless tobacco.  ETOH:   reports no history of alcohol use. Family History:   Family History   Problem Relation Age of Onset    Depression Mother    [facility] Migraines Mother     Cancer Father         Prostate and Testicular Cancer    Depression Father     Heart Disease Father     Migraines Father     Heart Disease Maternal Grandfather     Heart Disease Paternal Grandfather        A:     Diagnosis:    1. Grief reaction    2. Severe episode of recurrent major depressive disorder, without psychotic features ([facility] [facility]    3. ANTHONY (generalized anxiety disorder)          Diagnosis Date    Anxiety     Asthma     [facility]     Depression     Migraines        Plan: Pt will attend a follow up appointment next week  to assess symptoms and evaluate effectiveness of coping skills. Pt interventions:  Reviewed information about deep breathing, grief, and self care. Pt Behavioral Change Plan:     1. Pt will practice deep breathing exercises 2-3x's daily for 3-5 minutes. 2. Pt will familiarize self with charlie [employer]. 3. Pt will promote effective self care management daily-sleep, increased activities, supportive relationships, relaxation. 4. Pt was advised of indications of depressive symptoms and instructed to monitor if symptoms worsen or interfere with daily functioning, to consider following-up with either your primary care team or a behavioral health provider for possible counseling or medication management. If suicidal thoughts are experienced, call 911. An additional 24/7 resource is the [facility] [address] at 0-487-114-AQFB ([phone]).    5. Pt will follow up on referrals made for outpatient intensive services. 6. A follow up appointment is scheduled for next week.

## 2021-01-04 NOTE — Clinical Note
Pt will follow up on referral made for intensive outpatient care. A follow up appointment is scheduled for next week.

## 2021-01-04 NOTE — PATIENT INSTRUCTIONS
1. Depression: Facts, Myths & Tips for Feeling Better    Facts    Depression is very common  Nearly 20% of the [place] population experiences a significant depression during their lifetime. Depression is treatable  Because depression affects so many, and can have such a powerful and negative impact on life, there has been an enormous amount of research conducted on how to reduce symptoms and improve functioning. As a result, we now know there are behaviors YOU can engage in to make yourself feel better. Depression is not a weakness  People suffer from depression for a variety of reasons, biological, environmental and behavioral.  Research indicates that [employer] is NOT one of the reasons people become depressed. Depression is not something you are powerless against  Evidence suggests that you can directly impact the intensity and duration of depression by what you do and by altering the way you think about certain things. The Downward Spiral    Depression often begins as a drop in mood due to an environmental or biological trigger that makes people feel less like being active. Being less active, in turn, often causes people to experience an even lower mood and feel even less like being active, and so the cycle begins. How can I start feeling better? The first and best way to reverse the downward cycle is to get active! Your body produces its own anti-depressants every time you exercise or do something pleasurable. Regular exercise is one of the very best ways to improve your mood. In fact some studies have shown that a solid exercise program is as effective as psychotherapy or anti-depressant medication for some people.  *See physical activity section of handout 8. Am I fortune telling or mind reading in a way that gets me upset or unhappy? What are the odds (percent chance -- e.g., there is a 5% chance. ..) that it will really turn out the way Im thinking or imagining? 9. What are my options in this situation? How would I like to respond? 10. Create more moderate, helpful, or realistic statements to replace the upsetting ones. 11. Have I had any experiences that show that this thought might not be totally true? 12. If my best friend or someone I loved had this thought, what would I tell them? 15. If my best friend or someone I loved knew I was thinking this thought, what would they say to me? What evidence would they point out to me that would suggest that my thought is not completely true? 14. Are there strengths in me or positives in the situation that I am ignoring? Am I underestimating my ability to cope with unfortunate circumstances? 15. When I am not feeling this way, do I think about this situation any differently? How?  16. Have I been in this type of situation before? What happened? What have I learned from prior experiences that could help me now? 17. Five years from now, if I look back on this situation, will I look at it any differently? Will I focus on any different part of my experience? 25. Am I blaming myself for something over which I do not have complete control? 2. The Stress Response and How It Can Affect You   The stress response, or fight or flight response is the emergency reaction system of the body. It is there to keep you safe in emergencies. The stress response includes physical and thought responses to your perception of various situations. When the stress response is turned on, your body may release substances like adrenaline and cortisol. Your organs are programmed to respond in certain ways to situations that are viewed as challenging or threatening. The stress response can work against you. You can turn it on when you dont really need it and, as a result, perceive something as an emergency when its really not. It can turn on when you are just thinking about past or future events. Harmless, chronic conditions can be intensified by the stress response activating too often, with too much intensity, or for too long. Stress responses can be different for different individuals. Below is a list of some common stress related responses people have. ([place] the responses you have had in the last 2 weeks.)                                                                                                 Physical Responses   Muscle aches   Insomnia   ? Heart rate   Headache   Weight gain   Nausea   Constipation   Dry mouth   Muscle twitching  Weight loss   Low energy   Weakness   Tight chest   Diarrhea   Dizziness   Trembling   Stomach cramps  Chills    Hot flashes   Sweating   Pounding heart  Choking feeling  Chest pain   Leg cramps   Numb hands/feet Dry throat   Appetite change  Face flushing   ? Blood pressure  Light-headedness  Feeling faint        Trouble swallowing   Rash ? Urination   Neck pain             Tingling hands/feet                                                                                             Emotional and Thought Responses   Restlessness   Agitation   Insecurity             Worthlessness   Anxiety   Stress    Depression             Hopelessness   Guilt    Defensiveness  Anger            Racing thoughts   Nightmares   Intense thinking  Sensitivity            Expecting the worst   Numbness   Lack of motivation  Mood swings             Forgetfulness   ? Concentration  Rigidity              Preoccupation  Intolerance                                                Behavioral Responses   Avoidance   Withdrawal   Neglect   ? Alcohol use    Smoking   ?  Eating   Arguing        Poor appearance ? Spending   Poor hygiene   ? Eating   Seeking reassurance   Nail biting   Skin picking   ? Talking         ? Body checking   Sexual problems  Foot tapping   Fidgeting Rapid walking    ? Exercise   Teeth clenching           Multitasking   Aggressive speaking       ? Fun activities  ? Sleeping      ? Relaxing activities     Seeking information     The parasympathetic nervous system in your body is designed to turn on your bodys relaxation response. Your behaviors and thinking can keep your bodys natural relaxation response from operating at its best.     Getting your body to relax on a daily basis for at least brief periods can help decrease unpleasant stress responses. Learning to relax your body, through specific breathing and relaxation exercises as well as by minimizing stressful thinking, can help your bodys natural relaxation system be more effective. 3.   Deep Breathing Exercises (charlie [employer])      Exercise 1:  The Stimulating Breath (also called the Jeff Breath)   Its aim is to raise energy level and increase alertness. ? Inhale and exhale rapidly through your nose, keeping your mouth closed but relaxed. Your breaths in and out should be equal in duration, but as short as possible. This is a noisy breathing exercise. ? Try for three in-and-out breath cycles per second. This produces a quick movement of the diaphragm, suggesting a jeff. Breathe normally after each cycle. ? Do not do for more than 15 seconds on your first try. Each time you practice the Stimulating Breath, you can increase your time by five seconds or so, until you reach a full minute. If done properly, you may feel invigorated, comparable to the heightened awareness you feel after a good workout. You should feel the effort at the back of the neck, the diaphragm, the chest and the abdomen. Try this breathing exercise the next time you need an energy boost and feel yourself reaching for a cup of coffee.     Exercise 2: The 4-7-8 (or Relaxing Breath) Exercise  This exercise is utterly simple, takes almost no time, requires no equipment and can be done anywhere. Although you can do the exercise in any position, sit with your back straight while learning the exercise. Place the tip of your tongue against the ridge of tissue just behind your upper front teeth, and keep it there through the entire exercise. You will be exhaling through your mouth around your tongue; try pursing your lips slightly if this seems awkward. ? Exhale completely through your mouth, making a whoosh sound. ? Close your mouth and inhale quietly through your nose to a mental count of four. ? Hold your breath for a count of seven. ? Exhale completely through your mouth, making a whoosh sound to a count of eight. ? This is one breath. Now inhale again and repeat the cycle three more times for a total of four breaths. Note that you always inhale quietly through your nose and exhale audibly through your mouth. The tip of your tongue stays in position the whole time. Exhalation takes twice as long as inhalation. The absolute time you spend on each phase is not important; the ratio of 4:7:8 is important. If you have trouble holding your breath, speed the exercise up but keep to the ratio of 4:7:8 for the three phases. With practice you can slow it all down and get used to inhaling and exhaling more and more deeply. This exercise is a natural tranquilizer for the nervous system. Unlike tranquilizing drugs, which are often effective when you first take them but then lose their power over time, this exercise is subtle when you first try it but gains in power with repetition and practice. Do it at least twice a day. You cannot do it too frequently. Do NOT do more than 4 breaths at one time for the first month of practice. Later, if you wish, you can extend it to eight breaths. If you feel a little lightheaded when you first breathe this way, do not be concerned; it will pass. Once you develop this technique by practicing it every day, it will be a very useful tool that you will always have with you. Use it whenever anything upsetting happens - before you react. Use it whenever you are aware of internal tension. Use it to help you fall asleep. This exercise cannot be recommended too highly. Everyone can benefit from it. Exercise 3: Meditation exercise  Breath Counting  If you want to get a feel for this challenging work, try your hand at breath counting, a deceptively simple technique. Sit in a comfortable position with the spine straight and head inclined slightly forward. Gently close your eyes and take a few deep breaths. Then let the breath come naturally without trying to influence it. Ideally it will be quiet and slow, but depth and rhythm may vary. ? To begin the exercise, count \"one\" to yourself as you exhale. ? The next time you exhale, count \"two,\" and so on up to [place]. \"  ? Then begin a new cycle, counting \"one\" on the next exhalation. Never count higher than \"five,\" and count only when you exhale. You will know your attention has wandered when you find yourself up to \"eight,\" \"12,\" even \"19. \"  Try to do 10 minutes of this form of meditation.

## 2021-01-06 ENCOUNTER — CARE COORDINATION (OUTPATIENT)
Dept: CARE COORDINATION | Age: 41
End: 2021-01-06

## 2021-01-06 ASSESSMENT — ENCOUNTER SYMPTOMS: DYSPNEA ASSOCIATED WITH: EXERTION

## 2021-01-06 NOTE — CARE COORDINATION
Ambulatory Care Coordination Note  1/6/2021  CM Risk Score: 2  [place]son 10 Year Mortality Risk Score: 4%     ACC: Marcela Reddy, RN    Summary Note: Jad Chavez is being followed by care coordination for education and assistance in managing her COPD, depression. Spoke with Jad Chavez today for f/u. Pt tearful at times during call. Reports that her depression has been worse over the last wk. Had family emergency with her brother in law and had to miss work. Did not feel they handled the situation well so she reports that she is no longer working there. Currently looking for a new job. Since she is not working she has had more down time which has caused her to be even more depressed. Understands that she needs therapy to get her through this. Continues to work with Monica Sanderson Had spoken with her about this. Pt reports that she has decided to go with [facility] b/c it is close to where she is staying and reports that they have an intense outpt program as well. Pt went today but has to go back tomorrow for assess. Reports that they will get her in a couple of wks. Pt reports that family continues to be very supportive. Family with her at all times. Denies having plan to harm self. Has negative thoughts but states that she could never harm herself. Sister is managing pt's meds at this time. Pt is taking Klonapin 1 tab twice daily as needed. Continues to have difficulty sleeping. Only taking 50mg of Seroquel. Not interested in taking anymore at this time. Has next appt with Julius Shultz on 1/11.  Plans to continue f/u with her even once she gets established with O[facility] as she feels talking to her has been beneficial.   Continues to try to utilize [email or web address] that was recommended by Julius Shultz  Had recent EEG and echo which were normal.   Plan of care:  Continue working with [facility]. Next appt on 1/11  Try to keep self busy during the day to keep mind busy on other things  F/u with OMavistali tomorrow for completion of Santiago Zana, APRN - CNP   ketorolac (TORADOL) 10 MG tablet Take 1 tablet by mouth every 8 hours as needed for Pain 1/4/21   Santiago Zana, APRN - CNP   clonazePAM (KLONOPIN) 1 MG tablet Take 1 tablet by mouth 2 times daily as needed for Anxiety for up to 15 days. 1/4/21 1/19/21  Santiago Zana, APRN - CNP   albuterol (PROVENTIL) (2.5 MG/3ML) 0.083% nebulizer solution Take 3 mLs by nebulization every 6 hours as needed for Wheezing 12/7/20   Santiago Zana, APRN - CNP   saline nasal gel (AYR) GEL by Nasal route as needed for Congestion 12/7/20   Santiago Zana, APRN - CNP   tiZANidine (ZANAFLEX) 4 MG tablet Take 1 tablet by mouth every 8 hours as needed (neck pain) 12/7/20   Santiago Zana, APRN - CNP   naproxen (NAPROSYN) 500 MG tablet Take 1 tablet by mouth 2 times daily (with meals) 12/7/20   Santiago Zana, APRN - CNP   gabapentin (NEURONTIN) 800 MG tablet Take 1 tablet by mouth 4 times daily for 180 days.  12/7/20 6/5/21  Santiago Zana, APRN - CNP   VILAZODONE HCL 10 MG Tablet Take 1 tablet by mouth once daily  Patient not taking: Reported on 12/16/2020 10/15/20   Santiago Zana, APRN - CNP   SYMBICORT 80-4.5 MCG/ACT AERO INHALE 2 PUFFS BY MOUTH TWICE DAILY, RINSE MOUTH AFTER USE 10/15/20   Santiago Zana, APRN - CNP   divalproex (DEPAKOTE ER) 500 MG extended release tablet TAKE 1 TABLET BY MOUTH ONCE BID  Patient not taking: Reported on 12/16/2020 9/3/20   Santiago Zana, APRN - CNP   oxybutynin (DITROPAN-XL) 10 MG extended release tablet Take 1 tablet by mouth daily 7/9/20   Santiago Zana, APRN - CNP   albuterol sulfate HFA (VENTOLIN HFA) 108 (90 Base) MCG/ACT inhaler Inhale 1-2 puffs into the lungs every 6 hours as needed for Wheezing 6/9/20   Santiago Zana, APRN - CNP   Respiratory Therapy Supplies (NEBULIZER COMPRESSOR) KIT 1 kit by Does not apply route once for 1 dose 9/5/19 9/5/19  EVA Keller CNP   Multiple Vitamins-Calcium (ONE-A-DAY WOMENS PO) Take by mouth daily     Historical Provider, MD       Future Appointments   Date Time Provider Raman Elmore   1/11/2021  8:00 AM Regina Garces [facility] [address] [facility]

## 2021-01-14 ENCOUNTER — VIRTUAL VISIT (OUTPATIENT)
Dept: BEHAVIORAL/MENTAL HEALTH CLINIC | Age: 41
End: 2021-01-14
Payer: MEDICARE

## 2021-01-14 DIAGNOSIS — F41.1 GAD (GENERALIZED ANXIETY DISORDER): ICD-10-CM

## 2021-01-14 DIAGNOSIS — F43.21 GRIEF REACTION: Primary | ICD-10-CM

## 2021-01-14 DIAGNOSIS — F33.2 SEVERE EPISODE OF RECURRENT MAJOR DEPRESSIVE DISORDER, WITHOUT PSYCHOTIC FEATURES (HCC): ICD-10-CM

## 2021-01-14 PROCEDURE — 90832 PSYTX W PT 30 MINUTES: CPT | Performed by: SOCIAL WORKER

## 2021-01-14 PROCEDURE — 4004F PT TOBACCO SCREEN RCVD TLK: CPT | Performed by: SOCIAL WORKER

## 2021-01-14 NOTE — PROGRESS NOTES
Behavioral Health Consultation  TOSHA Hartmann-S  1/14/2021  8:00 AM EDT  This note will not be viewable in [employer] for the following reason(s). This is a Psychotherapy Note. Time spent with Patient: 20 minutes  This is patient's eighth [facility] appointment. Reason for Consult:    Chief Complaint   Patient presents with    Other     grief reaction    Depression    Anxiety     Referring Provider: ABEBE Keller  Pt provided informed consent for the behavioral health program. Discussed with patient model of service to include the limits of confidentiality (i.e. abuse reporting, suicide intervention, etc.) and short-term intervention focused approach. Pt indicated understanding. Feedback given to PCP. [facility]  [facility]  Dept: [phone]  Dept Fax: [phone]    TELEHEALTH EVALUATION -- Audio/Visual (During EKH-76 public health emergency)  This visit was performed via a synchronous telecommunication system. Pt location: [facility]  Provider location: [facility] [employer] ([address] [place] [address])  Pt notified that this was a virtual visit and that we will submit a claim for reimbursement to their insurance company. They were made aware that any copays, coinsurance amounts, or other amounts not covered will be their responsibility. Pt accepted?  yes  1  Pursuant to the emergency declaration under the [facility], 1135 waiver authority and the [employer] Act, this Virtual  Visit was conducted, with patient's consent, to reduce the patient's risk of exposure to COVID-19 and provide continuity of care for an established patient. Services were provided through a video synchronous discussion virtually to substitute for in-person clinic visit. S:  Pt assessed that her symptoms of grief, anxiety, and depression remain steady, but slightly better.   She reports having followed up on referral for intensive outpatient services at [facility]. She verbalized uncertainty of this being a good fit for her based on the initial contact, but acknowledged that this is an early encounter to assess how it might benefit her. She expressed a shift in her perspective of her needs to be resilient and discussed what this looks like in daily self care, management of responsibilities, boundaries, and finding suitable employment. Pt was advised of indications of depressive symptoms and instructed to monitor if symptoms worsen or interfere with daily functioning, to consider following-up with either your primary care team or a behavioral health provider for possible counseling or medication management. If suicidal thoughts are experienced, call 911. An additional 24/7 resource is the [facility] [employer] [facility] at [phone]-ASTD ([phone]). Pt will attend a follow up appointment next week as she awaits assignment and follow up to her counselor. O:  MSE:    Appearance    calm  Appetite normal  Sleep disturbance Yes  Fatigue Yes  Loss of pleasure Yes  Impulsive behavior No  Speech    normal rate  Mood    Depressed  Affect    normal affect  Thought Content    intact  Thought Process    coherent  Associations    logical connections  Insight    Fair  Judgment    Intact  Orientation    oriented to person, place, time, and general circumstances  Memory    recent and remote memory intact  Attention/Concentration    intact  Morbid ideation No  Suicide Assessment    no suicidal ideation; fleeting thoughts of being better off dead, no intention or desire. Strong protective factors of responsibility to her family. History:    TOBACCO:   reports that she has been smoking cigarettes. She has been smoking about 0.50 packs per day. She has never used smokeless tobacco.  ETOH:   reports no history of alcohol use.   Family History:   Family History   Problem Relation Age of Onset    Depression Mother    [facility] Migraines Mother     Cancer Father         Prostate and Testicular Cancer    Depression Father     Heart Disease Father     Migraines Father     Heart Disease Maternal Grandfather     Heart Disease Paternal Grandfather        A:     Diagnosis:    1. Grief reaction    2. Severe episode of recurrent major depressive disorder, without psychotic features ([facility] [address] [address]    3. ANTHONY (generalized anxiety disorder)          Diagnosis Date    Anxiety     Asthma     COPD (chronic obstructive pulmonary disease) ([facility] [address]     Depression     Migraines        Plan: Pt will attend a follow up appointment next week  to assess symptoms and evaluate effectiveness of coping skills. Pt interventions:  Reviewed information about deep breathing, grief, and self care. Pt Behavioral Change Plan:     1. Pt will practice deep breathing exercises 2-3x's daily for 3-5 minutes. 2. Pt will familiarize self with charlie Virtual Hope Box. 3. Pt will promote effective self care management daily-sleep, increased activities, supportive relationships, relaxation. 4. Pt was advised of indications of depressive symptoms and instructed to monitor if symptoms worsen or interfere with daily functioning, to consider following-up with either your primary care team or a behavioral health provider for possible counseling or medication management. If suicidal thoughts are experienced, call 911. An additional 24/7 resource is the [facility] at 8-130-470-KAKF ([phone]). 5. Pt will attended follow up appointment for outpatient intensive services. 6. A follow up appointment is scheduled for next week.

## 2021-01-22 ENCOUNTER — CARE COORDINATION (OUTPATIENT)
Dept: CARE COORDINATION | Age: 41
End: 2021-01-22

## 2021-01-22 ASSESSMENT — ENCOUNTER SYMPTOMS: DYSPNEA ASSOCIATED WITH: EXERTION

## 2021-01-22 NOTE — CARE COORDINATION
Ambulatory Care Coordination Note  1/22/2021  CM Risk Score: 2  Charlson 10 Year Mortality Risk Score: 4%     ACC: Spencer Thorne RN    Summary Note: Beth Masterson is being followed by care coordination for education and assistance in managing her depression/anxiety, grief, COPD. Spoke with Beth Masterson today for f/u. COPD: denies changes in breathing. Depression/anxiety- recent loss of . Getting established with [facility] behavioral health intensive outpt program.  Completed assess. Has phone appt next Wed 1/27. Has face to face visit 1/8. Continues to f/u with Daniel Staley. Last visit 1/13  Continues to fair with sleep. Not working at present time. Has interview on Monday. Hoping to start Tues if all goes well with interview. Pt in agreement that she feels work will help. Continues to utilize resources that [facility] has provided her. Plan of care:  Continue working with [facility]  Try to stay active during the day. F/u with [facility] as scheduled  Continue utilizing resources that Bharati Valdez has provided. Call with any new questions or concerns. COPD Assessment    Does the patient understand envrionmental exposure?: Yes  Is the patient able to verbalize Rescue vs. Long Acting medications?: Yes  Does the patient have a nebulizer?: Yes  Does the patient use a space with inhaled medications?: No            Symptoms:  None: Yes      Symptom course: stable  Breathlessness: exertion  Increase use of rapid acting/rescue inhaled medications?: No  Change in chronic cough?: No/At Baseline  Change in sputum?: No/At Baseline  Self Monitoring - SaO2: No  Have you had a recent diagnosis of pneumonia either by PCP or at a hospital?: No             Care Coordination Interventions    Program Enrollment: Complex Care  Referral from Primary Care Provider: No  Suggested Interventions and [address]: Declined (Comment: used to follow with [facility].  declines f/.u with them again)  Grief Counselor: Completed (Comment: following with [facility] Zuluaga. 1/7-will be going to [facility] for assess and to get started in an intensive outpt program. )  Transportation Support: Completed         Goals Addressed                 This Visit's Progress     Medication Management   On track     I will take my medication as directed. I will notify my provider of any problems with medications, like adverse effects or side effects. I will notify my provider/Care Coordinator if I am unable to afford my medications. I will notify my provider for advice before I stop taking any of my medication. Barriers: overwhelmed by complexity of regimen and anxiety, stress, grief  Plan for overcoming my barriers: support from behavioral health SW, ACM, PCP and education on medications. Confidence: 8/10  Anticipated Goal Completion Date: 2/16/21            Prior to Admission medications    Medication Sig Start Date End Date Taking? Authorizing Provider   ketorolac (TORADOL) 10 MG tablet Take 1 tablet by mouth every 8 hours as needed for Pain 1/4/21   Sherron Krameru, APRN - CNP   clonazePAM (KLONOPIN) 1 MG tablet Take 1 tablet by mouth 2 times daily as needed for Anxiety for up to 15 days. 1/4/21 1/19/21  Sherron Antrenetta, APRN - CNP   albuterol (PROVENTIL) (2.5 MG/3ML) 0.083% nebulizer solution Take 3 mLs by nebulization every 6 hours as needed for Wheezing 12/7/20   Sherron Antu, APRN - CNP   saline nasal gel (AYR) GEL by Nasal route as needed for Congestion 12/7/20   Sherron Krameru, APRN - CNP   tiZANidine (ZANAFLEX) 4 MG tablet Take 1 tablet by mouth every 8 hours as needed (neck pain) 12/7/20   Sherron Antu, APRN - CNP   naproxen (NAPROSYN) 500 MG tablet Take 1 tablet by mouth 2 times daily (with meals) 12/7/20   Sherron Krameru, APRN - CNP   gabapentin (NEURONTIN) 800 MG tablet Take 1 tablet by mouth 4 times daily for 180 days.  12/7/20 6/5/21  Sherron Antu, APRN - CNP   QUEtiapine (SEROQUEL) 200 MG tablet Take 1 tablet by mouth nightly  Patient taking differently: Take 50 mg by mouth nightly  12/1/20   EVA Galo CNP   VILAZODONE HCL 10 MG Tablet Take 1 tablet by mouth once daily  Patient not taking: Reported on 12/16/2020 10/15/20   EVA Galo CNP   SYMBICORT 80-4.5 MCG/ACT AERO INHALE 2 PUFFS BY MOUTH TWICE DAILY, RINSE MOUTH AFTER USE 10/15/20   EVA Galo CNP   divalproex (DEPAKOTE ER) 500 MG extended release tablet TAKE 1 TABLET BY MOUTH ONCE BID  Patient not taking: Reported on 12/16/2020 9/3/20   EVA Galo CNP   oxybutynin (DITROPAN-XL) 10 MG extended release tablet Take 1 tablet by mouth daily 7/9/20   EVA Galo CNP   albuterol sulfate HFA (VENTOLIN HFA) 108 (90 Base) MCG/ACT inhaler Inhale 1-2 puffs into the lungs every 6 hours as needed for Wheezing 6/9/20   EVA Galo CNP   Respiratory Therapy Supplies (NEBULIZER COMPRESSOR) KIT 1 kit by Does not apply route once for 1 dose 9/5/19 9/5/19  EVA Galo CNP   Multiple Vitamins-Calcium (ONE-A-DAY WOMENS PO) Take by mouth daily     Historical Provider, MD       No future appointments.

## 2021-02-08 ENCOUNTER — CARE COORDINATION (OUTPATIENT)
Dept: CARE COORDINATION | Age: 41
End: 2021-02-08

## 2021-02-08 RX ORDER — KETOROLAC TROMETHAMINE 10 MG/1
10 TABLET, FILM COATED ORAL EVERY 8 HOURS PRN
Qty: 20 TABLET | Refills: 0 | Status: SHIPPED | OUTPATIENT
Start: 2021-02-08 | End: 2021-03-08 | Stop reason: SDUPTHER

## 2021-02-08 NOTE — CARE COORDINATION
Attempted to reach [facility] today for care coordination f/u. No answer. Message left to return call.

## 2021-02-10 ENCOUNTER — TELEPHONE (OUTPATIENT)
Dept: FAMILY MEDICINE CLINIC | Age: 41
End: 2021-02-10

## 2021-02-18 ENCOUNTER — CARE COORDINATION (OUTPATIENT)
Dept: CARE COORDINATION | Age: 41
End: 2021-02-18

## 2021-02-18 DIAGNOSIS — F43.9 TRAUMA AND STRESSOR-RELATED DISORDER: ICD-10-CM

## 2021-02-18 DIAGNOSIS — J42 CHRONIC BRONCHITIS, UNSPECIFIED CHRONIC BRONCHITIS TYPE (HCC): ICD-10-CM

## 2021-02-18 NOTE — CARE COORDINATION
Attempted to reach [facility] today for f/u. No answer. Message left to return call. [address] Pt does not have any upcoming appts scheduled.   She will outreach to pt

## 2021-02-19 RX ORDER — ALBUTEROL SULFATE 90 UG/1
1-2 AEROSOL, METERED RESPIRATORY (INHALATION) EVERY 6 HOURS PRN
Qty: 18 G | Refills: 3 | Status: SHIPPED | OUTPATIENT
Start: 2021-02-19 | End: 2021-05-04 | Stop reason: SDUPTHER

## 2021-02-19 RX ORDER — CLONAZEPAM 1 MG/1
1 TABLET ORAL 2 TIMES DAILY PRN
Qty: 30 TABLET | Refills: 0 | Status: SHIPPED | OUTPATIENT
Start: 2021-02-19 | End: 2021-03-31 | Stop reason: SDUPTHER

## 2021-03-02 ENCOUNTER — CARE COORDINATION (OUTPATIENT)
Dept: CARE COORDINATION | Age: 41
End: 2021-03-02

## 2021-03-04 ENCOUNTER — VIRTUAL VISIT (OUTPATIENT)
Dept: BEHAVIORAL/MENTAL HEALTH CLINIC | Age: 41
End: 2021-03-04
Payer: MEDICARE

## 2021-03-04 DIAGNOSIS — F41.1 GAD (GENERALIZED ANXIETY DISORDER): ICD-10-CM

## 2021-03-04 DIAGNOSIS — F33.2 SEVERE EPISODE OF RECURRENT MAJOR DEPRESSIVE DISORDER, WITHOUT PSYCHOTIC FEATURES (HCC): ICD-10-CM

## 2021-03-04 DIAGNOSIS — F43.21 GRIEF REACTION: Primary | ICD-10-CM

## 2021-03-04 PROCEDURE — 90832 PSYTX W PT 30 MINUTES: CPT | Performed by: SOCIAL WORKER

## 2021-03-04 PROCEDURE — 4004F PT TOBACCO SCREEN RCVD TLK: CPT | Performed by: SOCIAL WORKER

## 2021-03-04 NOTE — PROGRESS NOTES
Behavioral Health Consultation  Lele Garner TOSHA-S  3/4/2021  4:09 PM EDT  This note will not be viewable in [employer] for the following reason(s). This is a Psychotherapy Note. Time spent with Patient: 23 minutes  This is patient's ninth [facility] appointment. Reason for Consult:    Chief Complaint   Patient presents with    Other     grief reactions    Depression    Anxiety     Referring Provider: ABEBE Keller  Pt provided informed consent for the behavioral health program. Discussed with patient model of service to include the limits of confidentiality (i.e. abuse reporting, suicide intervention, etc.) and short-term intervention focused approach. Pt indicated understanding. Feedback given to PCP. [facility]  [facility]  Dept: [phone]  Dept Fax: [phone]    TELEHEALTH EVALUATION -- Audio/Visual (During MVSZU-89 public health emergency)  This visit was performed via a synchronous telecommunication system. Pt location: [facility]  Provider location: [facility] ([address] [place] [address])  Pt notified that this was a virtual visit and that we will submit a claim for reimbursement to their insurance company. They were made aware that any copays, coinsurance amounts, or other amounts not covered will be their responsibility. Pt accepted?  yes  1  Pursuant to the emergency declaration under the [facility], 1135 waiver authority and the [employer] Act, this Virtual  Visit was conducted, with patient's consent, to reduce the patient's risk of exposure to COVID-19 and provide continuity of care for an established patient. Services were provided through a video synchronous discussion virtually to substitute for in-person clinic visit. S:  Pt assessed that her symptoms of grief, anxiety, and depression have progressively improved.  She evaluated things evident of this as a decrease in fluctuating moods/emotions, increased motivation, and a growing sense of optimism. Pt verbalized that she has obtain employment and this has helped in keep her mind distracted from replaying negative thoughts. She acknowledged that symptoms of grief often come in waves and discussed strategies and coping skills that offer comfort and relief in these moments. Pt was advised of indications of depressive symptoms and instructed to monitor if symptoms worsen or interfere with daily functioning, to consider following-up with either your primary care team or a behavioral health provider for possible counseling or medication management. If suicidal thoughts are experienced, call 911. An additional 24/7 resource is the [employer] at 1-012-425-CPKM ([phone]). Pt will attend a follow up appointment in two weeks. O:  MSE:    Appearance    calm  Appetite normal  Sleep disturbance Yes  Fatigue Yes  Loss of pleasure Yes  Impulsive behavior No  Speech    normal rate  Mood    Depressed  Affect    normal affect  Thought Content    intact  Thought Process    coherent  Associations    logical connections  Insight    Fair  Judgment    Intact  Orientation    oriented to person, place, time, and general circumstances  Memory    recent and remote memory intact  Attention/Concentration    intact  Morbid ideation No  Suicide Assessment    no suicidal ideation; fleeting thoughts of being better off dead, no intention or desire. Strong protective factors of responsibility to her family. History:    TOBACCO:   reports that she has been smoking cigarettes. She has been smoking about 0.50 packs per day. She has never used smokeless tobacco.  ETOH:   reports no history of alcohol use.   Family History:   Family History   Problem Relation Age of Onset    Depression Mother    [facility] Migraines Mother     Cancer Father         Prostate and Testicular Cancer    Depression Father     Heart Disease Father     Migraines Father     Heart Disease Maternal Grandfather     Heart Disease Paternal Grandfather        A:     Diagnosis:    1. Grief reaction    2. Severe episode of recurrent major depressive [facility]    3. ANTHONY (generalized anxiety disorder)          Diagnosis Date    Anxiety     Asthma     [facility] disease) [facility]     Depression     Migraines        Plan: Pt will attend a follow up appointment in two weeks  to assess symptoms and evaluate effectiveness of coping skills. Pt interventions:  Reviewed information about deep breathing, grief, and self care. Pt Behavioral Change Plan:     1. Pt will practice deep breathing exercises 2-3x's daily for 3-5 minutes. 2. Pt will familiarize self with charlie Virtual Hope Box. 3. Pt will promote effective self care management daily-sleep, increased activities, supportive relationships, relaxation. 4. Pt was advised of indications of depressive symptoms and instructed to monitor if symptoms worsen or interfere with daily functioning, to consider following-up with either your primary care team or a behavioral health provider for possible counseling or medication management. If suicidal thoughts are experienced, call 911. An additional 24/7 resource is the [address] [address] at 1-676-629-HLIT ([phone]). 5.  A follow up appointment is scheduled for in two weeks.

## 2021-03-05 ENCOUNTER — HOSPITAL ENCOUNTER (EMERGENCY)
Age: 41
Discharge: HOME | End: 2021-03-05
Payer: MEDICARE

## 2021-03-05 VITALS
DIASTOLIC BLOOD PRESSURE: 80 MMHG | SYSTOLIC BLOOD PRESSURE: 122 MMHG | RESPIRATION RATE: 16 BRPM | TEMPERATURE: 96.9 F | BODY MASS INDEX: 34.06 KG/M2 | HEART RATE: 72 BPM | WEIGHT: 174.38 LBS | OXYGEN SATURATION: 97 %

## 2021-03-05 DIAGNOSIS — G89.29 OTHER CHRONIC PAIN: Primary | ICD-10-CM

## 2021-03-05 PROCEDURE — 99213 OFFICE O/P EST LOW 20 MIN: CPT

## 2021-03-05 PROCEDURE — 99214 OFFICE O/P EST MOD 30 MIN: CPT | Performed by: NURSE PRACTITIONER

## 2021-03-05 ASSESSMENT — PAIN DESCRIPTION - ONSET: ONSET: PROGRESSIVE

## 2021-03-05 ASSESSMENT — ENCOUNTER SYMPTOMS
COUGH: 0
VOMITING: 0
SHORTNESS OF BREATH: 0
DIARRHEA: 0
ABDOMINAL PAIN: 0
NAUSEA: 0

## 2021-03-05 ASSESSMENT — PAIN DESCRIPTION - LOCATION: LOCATION: HEAD;NECK;SHOULDER

## 2021-03-05 ASSESSMENT — PAIN DESCRIPTION - DESCRIPTORS: DESCRIPTORS: THROBBING

## 2021-03-05 ASSESSMENT — PAIN DESCRIPTION - PAIN TYPE: TYPE: ACUTE PAIN

## 2021-03-05 ASSESSMENT — PAIN DESCRIPTION - PROGRESSION: CLINICAL_PROGRESSION: GRADUALLY WORSENING

## 2021-03-05 NOTE — ED NOTES
Pt called from the waiting room to come back to a room. Pt stood up and swayed to the right, stating that \"my whole right side is out\". Pt to room 1 and is c/o a migraine that is so bad that it is affecting her right neck and shoulder. She states this has happened in the past. 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 She also reports that she took 2 muscle relaxers at 12:30 and when they didn't work, she took 2 more at 13:00. She states she tried to call Dr Maddox's office and they are closed.       Jean Velzo RN  03/05/21 [id number]

## 2021-03-05 NOTE — ED PROVIDER NOTES
[address] [facility] Encounter      CHIEFCOMPLAINT       Chief Complaint   Patient presents with    Migraine     affecting my right shoulder/neck        Nurses Notes reviewed and I agree except as noted in the HPI. HISTORY OF PRESENT ILLNESS   Devante Kessler is a 37 y.o. female who presents with request for a work note. She states that she was at work today when a migraine began, leading into muscle soreness in her neck and shoulder. Patient states that she left work today today because she could not work and take the medicine that she needs. .  Patient states that she took 2 Zanaflex pills at 12:30 PM and then another 2 pills at 1 PM, like she always does. Patient is requesting refill of Toradol because she is out and has not been able to reach her primary care provider. Patient states that she is under a pain contract for chronic pain. REVIEW OF SYSTEMS     Review of Systems   Constitutional: Negative for chills and fever. Eyes: Negative for visual disturbance. Respiratory: Negative for cough and shortness of breath. Cardiovascular: Negative for chest pain. Gastrointestinal: Negative for abdominal pain, diarrhea, nausea and vomiting. Musculoskeletal: Positive for myalgias. Skin: Negative for rash. Allergic/Immunologic: Negative for environmental allergies and food allergies. Neurological: Positive for headaches (hx migraines). PAST MEDICAL HISTORY         Diagnosis Date    Anxiety     Asthma     COPD (chronic obstructive pulmonary disease) ([facility] [address]     Depression     Migraines        SURGICAL HISTORY     Patient  has a past surgical history that includes Tonsillectomy and adenoidectomy (1989); Ovary removal (2000); Cholecystectomy (2003); Tubal ligation (2005); Hysterectomy (2011); Dilation and curettage of uterus (2009); IGS Endo Sinus Sx (07/12/2017); and Tooth Extraction.     CURRENT MEDICATIONS       Discharge Medication List as of 3/5/2021 2:29 PM      CONTINUE these medications which have NOT CHANGED    Details   albuterol sulfate HFA (VENTOLIN HFA) 108 (90 Base) MCG/ACT inhaler Inhale 1-2 puffs into the lungs every 6 hours as needed for Wheezing, Disp-18 g, R-3Please consider 90 day supplies to promote better adherenceNormal      clonazePAM (KLONOPIN) 1 MG tablet Take 1 tablet by mouth 2 times daily as needed for Anxiety for up to 15 days. , Disp-30 tablet, R-0Normal      divalproex (DEPAKOTE ER) 500 MG extended release tablet TAKE 1 TABLET BY MOUTH ONCE BID, Disp-180 tablet, R-3Please consider 90 day supplies to promote better adherenceNormal      vilazodone HCl (VILAZODONE HCL) 10 MG TABS Take 1 tablet by mouth once daily, Disp-90 tablet, R-3Normal      QUEtiapine (SEROQUEL) 200 MG tablet Take 1 tablet by mouth nightly, Disp-90 tablet, R-3Please consider 90 day supplies to promote better adherenceNormal      tiZANidine (ZANAFLEX) 4 MG tablet Take 1 tablet by mouth every 8 hours as needed (neck pain), Disp-270 tablet, R-3Please consider 90 day supplies to promote better adherenceNormal      naproxen (NAPROSYN) 500 MG tablet Take 1 tablet by mouth 2 times daily (with meals), Disp-60 tablet, R-0Normal      gabapentin (NEURONTIN) 800 MG tablet Take 1 tablet by mouth 4 times daily for 180 days. , Disp-360 tablet, R-1Normal      albuterol (PROVENTIL) (2.5 MG/3ML) 0.083% nebulizer solution Take 3 mLs by nebulization every 6 hours as needed for Wheezing, Disp-120 each,R-3Normal      SYMBICORT 80-4.5 MCG/ACT AERO INHALE 2 PUFFS BY MOUTH TWICE DAILY, RINSE MOUTH AFTER USE, Disp-11 g,R-11,DAWNormal      oxybutynin (DITROPAN-XL) 10 MG extended release tablet Take 1 tablet by mouth daily, Disp-30 tablet,R-11Please consider 90 day supplies to promote better adherenceNormal      Multiple Vitamins-Calcium (ONE-A-DAY WOMENS PO) Take by mouth daily Historical Med      ketorolac (TORADOL) 10 MG tablet Take 1 tablet by mouth every 8 hours as needed for Pain, Disp-20 tablet, R-0Normal      saline nasal gel (AYR) GEL by Nasal route daily as needed for Congestion, Nasal, DAILY PRN Starting Wed 1/27/2021, Disp-3 Tube, R-3, Normal      Respiratory Therapy Supplies (NEBULIZER COMPRESSOR) KIT ONCE Starting Thu 9/5/2019, For 1 dose, Disp-1 kit, R-0, Print             ALLERGIES     Patient is is allergic to botox [onabotulinumtoxina (cosmetic)]; other; topamax [topiramate]; acetaminophen; aspirin; and ibuprofen. FAMILY HISTORY     Patient'sfamily history includes Cancer in her father; Depression in her father and mother; Heart Disease in her father, maternal grandfather, and paternal grandfather; Migraines in her father and mother. SOCIAL HISTORY     Patient  reports that she has been smoking cigarettes. She has been smoking about 0.50 packs per day. She has never used smokeless tobacco. She reports that she does not drink alcohol or use drugs. PHYSICAL EXAM     ED TRIAGE VITALS  BP: 122/80, Temp: 96.9 °F (36.1 °C), Pulse: 72, Resp: 16, SpO2: 97 %  Physical Exam  Vitals signs and nursing note reviewed. Constitutional:       General: She is not in acute distress. Appearance: Normal appearance. She is well-developed. HENT:      Head: Normocephalic and atraumatic. Right Ear: External ear normal.      Left Ear: External ear normal.      Nose: Nose normal.      Mouth/Throat:      Lips: Pink. Mouth: Mucous membranes are moist.      Pharynx: Oropharynx is clear. Eyes:      Conjunctiva/sclera: Conjunctivae normal.      Right eye: Right conjunctiva is not injected. Left eye: Left conjunctiva is not injected. Pupils: Pupils are equal.   Neck:      Musculoskeletal: Normal range of motion. No spinous process tenderness or muscular tenderness. Cardiovascular:      Rate and Rhythm: Normal rate. Heart sounds: Normal heart sounds. Pulmonary:      Effort: Pulmonary effort is normal.      Breath sounds: Normal breath sounds.    Skin:     General: Skin is warm and dry. Findings: No rash (on exposed surfaces). Neurological:      Mental Status: She is alert and oriented to person, place, and time. Psychiatric:         Mood and Affect: Mood normal.         Speech: Speech normal.         Behavior: Behavior is cooperative. DIAGNOSTIC RESULTS   Labs:  Abnormal Labs Reviewed - No data to display     IMAGING:  No orders to display     URGENT CARE COURSE:     Vitals:    03/05/21 1414   BP: 122/80   Pulse: 72   Resp: 16   Temp: 96.9 °F (36.1 °C)   TempSrc: Temporal   SpO2: 97%   Weight: 174 lb 6 oz (79.1 kg)       Medications - No data to display  PROCEDURES:  FINALIMPRESSION      1. Other chronic pain        DISPOSITION/PLAN   DISPOSITION Decision To Discharge 03/05/2021 02:28:58 PM    Patient advised that she will need to discuss her pain management medications with the prescribing doctor (s). Advised patient to take medications as directed. Physical assessment findings, diagnostic testing(s) if applicable, and vital signs reviewed with patient/patient representative. Questions answered. If applicable, patient/patient representative will be contacted upon receipt of final culture and sensitivity or other testing results when available. Any additions or changes to medications or changes the plan of care will be made at that time. Medications as directed, including OTC medications for supportive care. Education provided on medications. Differential diagnosis(s) discussed with patient/patient representative. Home care/self care instructions reviewed with patient/patient representative. Patient is to follow-up with family care provider in 2-3 days if no improvement. Patient is to go to the emergency department if symptoms worsen. Patient/patient representative is aware of care plan, questions answered, verbalizes understanding and is in agreement.   Teach back method used for patient/patient representative teaching(s) and printed instructions attached to after visit summary. Problem List Items Addressed This Visit     None      Visit Diagnoses     Other chronic pain    -  Primary          PATIENT REFERRED TO:  Saad Sauer, EVA - CNP  [address] [facility], [address] [address]  [address]    Schedule an appointment as soon as possible for a visit in 3 days  For further evaluation. , If symptoms change/worsen, go to the [address] [facility]  [address] [facility]  [phone]    as needed, If symptoms change/worsen, go to the [address] [facility], [address] Mans[address] Clemons, APRN - CNP  03/05/21 9808

## 2021-03-07 ENCOUNTER — PATIENT MESSAGE (OUTPATIENT)
Dept: FAMILY MEDICINE CLINIC | Age: 41
End: 2021-03-07

## 2021-03-08 ENCOUNTER — TELEPHONE (OUTPATIENT)
Dept: FAMILY MEDICINE CLINIC | Age: 41
End: 2021-03-08

## 2021-03-08 RX ORDER — KETOROLAC TROMETHAMINE 10 MG/1
10 TABLET, FILM COATED ORAL EVERY 8 HOURS PRN
Qty: 20 TABLET | Refills: 0 | Status: SHIPPED | OUTPATIENT
Start: 2021-03-08 | End: 2021-04-04 | Stop reason: SDUPTHER

## 2021-03-08 NOTE — TELEPHONE ENCOUNTER
From: John Paul Oneil  To: EVA Guerra CNP  Sent: 3/7/2021 7:21 PM EST  Subject: Visit Follow-Up Question    I need to see about refilling the torodal because i was an urgent care and they did nothing for me

## 2021-03-09 ENCOUNTER — CARE COORDINATION (OUTPATIENT)
Dept: CARE COORDINATION | Age: 41
End: 2021-03-09

## 2021-03-09 NOTE — CARE COORDINATION
Attempted to reach [facility] today for care coordination f/u. No answer. Message left to return call. Have not been able to reach pt last 4 attempts. Will d/c from care coordination per protocol at this time.

## 2021-03-12 ENCOUNTER — APPOINTMENT (OUTPATIENT)
Dept: GENERAL RADIOLOGY | Age: 41
End: 2021-03-12
Payer: MEDICARE

## 2021-03-12 ENCOUNTER — HOSPITAL ENCOUNTER (EMERGENCY)
Age: 41
Discharge: HOME | End: 2021-03-12
Payer: MEDICARE

## 2021-03-12 VITALS
BODY MASS INDEX: 33.98 KG/M2 | RESPIRATION RATE: 16 BRPM | WEIGHT: 174 LBS | TEMPERATURE: 97.5 F | DIASTOLIC BLOOD PRESSURE: 88 MMHG | SYSTOLIC BLOOD PRESSURE: 121 MMHG | HEART RATE: 72 BPM | OXYGEN SATURATION: 97 %
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
## 2021-03-12 DIAGNOSIS — R20.0 BILATERAL HAND NUMBNESS: ICD-10-CM

## 2021-03-12 PROCEDURE — 99213 OFFICE O/P EST LOW 20 MIN: CPT

## 2021-03-12 PROCEDURE — 99214 OFFICE O/P EST MOD 30 MIN: CPT | Performed by: NURSE PRACTITIONER

## 2021-03-12 PROCEDURE — 72040 X-RAY EXAM NECK SPINE 2-3 VW: CPT

## 2021-03-12 RX ORDER — PREDNISONE 20 MG/1
20 TABLET ORAL 2 TIMES DAILY
Qty: 10 TABLET | Refills: 0 | Status: SHIPPED | OUTPATIENT
Start: 2021-03-12 | End: 2021-03-17

## 2021-03-12 ASSESSMENT — ENCOUNTER SYMPTOMS
ABDOMINAL PAIN: 0
SORE THROAT: 0
EYE DISCHARGE: 0
TROUBLE SWALLOWING: 0
DIARRHEA: 0
COUGH: 0
VOMITING: 0
SHORTNESS OF BREATH: 0
NAUSEA: 0
ALLERGIC/IMMUNOLOGIC NEGATIVE: 1
EYE PAIN: 0
EYE REDNESS: 0
CONSTIPATION: 0
BACK PAIN: 0
RHINORRHEA: 0
WHEEZING: 0

## 2021-03-12 ASSESSMENT — PAIN DESCRIPTION - PAIN TYPE: TYPE: ACUTE PAIN

## 2021-03-12 ASSESSMENT — PAIN DESCRIPTION - DESCRIPTORS: DESCRIPTORS: ACHING;NUMBNESS

## 2021-03-12 ASSESSMENT — PAIN - FUNCTIONAL ASSESSMENT: PAIN_FUNCTIONAL_ASSESSMENT: PREVENTS OR INTERFERES SOME ACTIVE ACTIVITIES AND ADLS

## 2021-03-12 ASSESSMENT — PAIN DESCRIPTION - PROGRESSION: CLINICAL_PROGRESSION: GRADUALLY WORSENING

## 2021-03-12 ASSESSMENT — PAIN DESCRIPTION - ORIENTATION: ORIENTATION: RIGHT;LEFT
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
## 2021-03-12 NOTE — LETTER
[facility]  [address]  [address]  Phone: [phone]    No name on file. March 12, 2021     Patient: Wellington Severe   YOB: 1980   Date of Visit: 3/12/2021       To Whom it May Concern:    Syeda Davenport was seen in my clinic on 3/12/2021. She may return to work on March 13, 2021. If you have any questions or concerns, please don't hesitate to call.     Sincerely,     Electronically signed by EVA Mccurdy CNP on 3/12/2021 at 3:47 PM
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
## 2021-03-15 ENCOUNTER — PATIENT MESSAGE (OUTPATIENT)
Dept: FAMILY MEDICINE CLINIC | Age: 41
End: 2021-03-15

## 2021-03-18 ENCOUNTER — VIRTUAL VISIT (OUTPATIENT)
Dept: BEHAVIORAL/MENTAL HEALTH CLINIC | Age: 41
End: 2021-03-18
Payer: MEDICARE

## 2021-03-18 DIAGNOSIS — F43.21 GRIEF REACTION: Primary | ICD-10-CM

## 2021-03-18 DIAGNOSIS — F41.1 GAD (GENERALIZED ANXIETY DISORDER): ICD-10-CM

## 2021-03-18 DIAGNOSIS — F33.2 SEVERE EPISODE OF RECURRENT MAJOR DEPRESSIVE DISORDER, WITHOUT PSYCHOTIC FEATURES (HCC): ICD-10-CM

## 2021-03-18 PROCEDURE — 4004F PT TOBACCO SCREEN RCVD TLK: CPT | Performed by: SOCIAL WORKER

## 2021-03-18 PROCEDURE — 90832 PSYTX W PT 30 MINUTES: CPT | Performed by: SOCIAL WORKER
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
## 2021-03-31 ENCOUNTER — OFFICE VISIT (OUTPATIENT)
Dept: FAMILY MEDICINE CLINIC | Age: 41
End: 2021-03-31
Payer: MEDICARE

## 2021-03-31 VITALS
TEMPERATURE: 97.9 F | SYSTOLIC BLOOD PRESSURE: 122 MMHG | DIASTOLIC BLOOD PRESSURE: 74 MMHG | BODY MASS INDEX: 33.92 KG/M2 | HEART RATE: 72 BPM | RESPIRATION RATE: 16 BRPM | WEIGHT: 173.7 LBS

## 2021-03-31 DIAGNOSIS — R29.898 WEAKNESS OF BOTH HANDS: ICD-10-CM

## 2021-03-31 DIAGNOSIS — M54.12 CHRONIC CERVICAL RADICULOPATHY: ICD-10-CM

## 2021-03-31 DIAGNOSIS — J42 CHRONIC BRONCHITIS, UNSPECIFIED CHRONIC BRONCHITIS TYPE (HCC): ICD-10-CM

## 2021-03-31 DIAGNOSIS — F43.9 TRAUMA AND STRESSOR-RELATED DISORDER: ICD-10-CM

## 2021-03-31 DIAGNOSIS — R20.0 NUMBNESS AND TINGLING IN BOTH HANDS: Primary | ICD-10-CM

## 2021-03-31 DIAGNOSIS — R56.9 SEIZURE-LIKE ACTIVITY (HCC): ICD-10-CM

## 2021-03-31 DIAGNOSIS — R20.2 NUMBNESS AND TINGLING IN BOTH HANDS: Primary | ICD-10-CM

## 2021-03-31 PROCEDURE — 4004F PT TOBACCO SCREEN RCVD TLK: CPT | Performed by: NURSE PRACTITIONER

## 2021-03-31 PROCEDURE — G8484 FLU IMMUNIZE NO ADMIN: HCPCS | Performed by: NURSE PRACTITIONER

## 2021-03-31 PROCEDURE — 99213 OFFICE O/P EST LOW 20 MIN: CPT | Performed by: NURSE PRACTITIONER

## 2021-03-31 PROCEDURE — G8427 DOCREV CUR MEDS BY ELIG CLIN: HCPCS | Performed by: NURSE PRACTITIONER

## 2021-03-31 PROCEDURE — G8417 CALC BMI ABV UP PARAM F/U: HCPCS | Performed by: NURSE PRACTITIONER

## 2021-03-31 PROCEDURE — 3023F SPIROM DOC REV: CPT | Performed by: NURSE PRACTITIONER

## 2021-03-31 PROCEDURE — G8926 SPIRO NO PERF OR DOC: HCPCS | Performed by: NURSE PRACTITIONER

## 2021-03-31 RX ORDER — CLONAZEPAM 1 MG/1
1 TABLET ORAL NIGHTLY PRN
Qty: 30 TABLET | Refills: 0 | Status: SHIPPED | OUTPATIENT
Start: 2021-03-31 | End: 2021-05-04 | Stop reason: SDUPTHER

## 2021-03-31 ASSESSMENT — ENCOUNTER SYMPTOMS
COUGH: 0
SHORTNESS OF BREATH: 0
NAUSEA: 0
BACK PAIN: 1
ABDOMINAL PAIN: 0

## 2021-03-31 NOTE — PROGRESS NOTES
Visit Information    Have you changed or started any medications since your last visit including any over-the-counter medicines, vitamins, or herbal medicines? no   Are you having any side effects from any of your medications? -  no  Have you stopped taking any of your medications? Is so, why? -  no    Have you seen any other physician or provider since your last visit? Yes - Records Obtained  Have you had any other diagnostic tests since your last visit? Yes - Records Obtained  Have you been seen in the emergency room and/or had an admission to a hospital since we last saw you? Yes - Records Obtained  Have you had your routine dental cleaning in the past 6 months? no    Have you activated your [employer] account? If not, what are your barriers?  Yes     Patient Care Team:  EVA Romero CNP as PCP - General  EVA Romero CNP as PCP - [facility]  Gómez Landeros MD as Consulting Physician (Otolaryngology)  Urvashi Beasley MD (Neurology)    Medical History Review  Past Medical, Family, and Social History reviewed and does contribute to the patient presenting condition    Health Maintenance   Topic Date Due    Hepatitis C screen  Never done    Varicella vaccine (1 of 2 - 2-dose childhood series) Never done    Pneumococcal 0-64 years Vaccine (1 of 1 - PPSV23) Never done    HIV screen  Never done    COVID-19 Vaccine (1) Never done    Cervical cancer screen  07/10/2018    Flu vaccine (1) 09/01/2020    Diabetes screen  Never done    Lipid screen  07/30/2023    DTaP/Tdap/Td vaccine (2 - Td) 09/19/2028    Hepatitis A vaccine  Aged Out    Hepatitis B vaccine  Aged Out    Hib vaccine  Aged Out    Meningococcal (ACWY) vaccine  Aged Out

## 2021-03-31 NOTE — LETTER
[address] [id number]  Phone: [phone]  Fax: [phone] [facility], APRN - CNP        March 31, 2021     Patient: Chrystal Orlando   YOB: 1980   Date of Visit: 3/31/2021       To Whom it May Concern:    Eric Epstein was seen in my clinic on 3/31/2021. If you have any questions or concerns, please don't hesitate to call.     Sincerely,         EVA Leon CNP
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
## 2021-04-01 ENCOUNTER — PATIENT MESSAGE (OUTPATIENT)
Dept: FAMILY MEDICINE CLINIC | Age: 41
End: 2021-04-01

## 2021-04-01 DIAGNOSIS — R20.0 NUMBNESS AND TINGLING IN BOTH HANDS: Primary | ICD-10-CM

## 2021-04-01 DIAGNOSIS — R20.2 NUMBNESS AND TINGLING IN BOTH HANDS: Primary | ICD-10-CM

## 2021-04-01 DIAGNOSIS — M54.12 CHRONIC CERVICAL RADICULOPATHY: ICD-10-CM

## 2021-04-02 RX ORDER — METHYLPREDNISOLONE 4 MG/1
TABLET ORAL
Qty: 1 KIT | Refills: 0 | Status: SHIPPED | OUTPATIENT
Start: 2021-04-02 | End: 2021-04-08

## 2021-04-05 RX ORDER — KETOROLAC TROMETHAMINE 10 MG/1
10 TABLET, FILM COATED ORAL EVERY 8 HOURS PRN
Qty: 20 TABLET | Refills: 0 | Status: SHIPPED | OUTPATIENT
Start: 2021-04-05 | End: 2021-05-04 | Stop reason: SDUPTHER

## 2021-04-08 ENCOUNTER — VIRTUAL VISIT (OUTPATIENT)
Dept: BEHAVIORAL/MENTAL HEALTH CLINIC | Age: 41
End: 2021-04-08
Payer: MEDICARE

## 2021-04-08 DIAGNOSIS — F43.21 GRIEF REACTION: Primary | ICD-10-CM

## 2021-04-08 DIAGNOSIS — F41.1 GAD (GENERALIZED ANXIETY DISORDER): ICD-10-CM

## 2021-04-08 DIAGNOSIS — F33.2 SEVERE EPISODE OF RECURRENT MAJOR DEPRESSIVE DISORDER, WITHOUT PSYCHOTIC FEATURES (HCC): ICD-10-CM

## 2021-04-08 PROCEDURE — 90832 PSYTX W PT 30 MINUTES: CPT | Performed by: SOCIAL WORKER

## 2021-04-08 PROCEDURE — 4004F PT TOBACCO SCREEN RCVD TLK: CPT | Performed by: SOCIAL WORKER

## 2021-04-08 NOTE — Clinical Note
Pt is scheduled for a follow up in four weeks. She is transitioning to services at [facility] for grief groups and crisis supports.

## 2021-04-08 NOTE — PROGRESS NOTES
worsened this past week and sought support at [facility] for fleeting thoughts of suicide. Pt reviewed that a safety plan was developed and she is scheduled for follow up services with an ongoing counselor. She processed events that have intensified a sense of hopelessness and those that offer support and comfort. Pt discussed how she is taking things one day at a time. She discussed her motivation for resiliency as her family. She has a goal to obtain her own place and identified steps taken to achieve this. She acknowledged practicing self calming skills daily, reaching out for help as needed, and prioritize good self care. Pt was advised of indications of depressive symptoms and instructed to monitor if symptoms worsen or interfere with daily functioning, to consider following-up with either your primary care team or a behavioral health provider for possible counseling or medication management. If suicidal thoughts are experienced, call 911. An additional 24/7 resource is the [address] at 5-775-346-FONY ([phone]). Pt will attend a follow up appointment in four weeks. She will seek support at [facility] if suicidal ideations are experienced and will attend follow up appointments with them. O:  MSE:    Appearance    calm  Appetite normal  Sleep disturbance Yes  Fatigue Yes  Loss of pleasure Yes  Impulsive behavior No  Speech    normal rate  Mood    Depressed  Affect    normal affect  Thought Content    intact  Thought Process    coherent  Associations    logical connections  Insight    Fair  Judgment    Intact  Orientation    oriented to person, place, time, and general circumstances  Memory    recent and remote memory intact  Attention/Concentration    intact  Morbid ideation No  Suicide Assessment    Hx of suicidal ideations; fleeting thoughts of being better off dead, no intention or desire. Strong protective factors of responsibility to her family.  Pt will continue to follow safety plan and seek support at [facility] as needed for suicidal ideations. History:    TOBACCO:   reports that she has been smoking cigarettes. She has been smoking about 0.50 packs per day. She has never used smokeless tobacco.  ETOH:   reports no history of alcohol use. Family History:   Family History   Problem Relation Age of Onset    Depression Mother    [de-identified] Migraines Mother     Cancer Father         Prostate and Testicular Cancer    Depression Father     Heart Disease Father     Migraines Father     Heart Disease Maternal Grandfather     Heart Disease Paternal Grandfather        A:     Diagnosis:    1. Grief reaction    2. Severe episode of recurrent major depressive disorder, without psychotic features ([facility]    3. ANTHONY (generalized anxiety disorder)          Diagnosis Date    Anxiety     Asthma     COPD (chronic obstructive pulmonary disease) ([facility]     Depression     Migraines        Plan: Pt will attend a follow up appointment in four weeks  to assess symptoms and evaluate effectiveness of coping skills. She will attend follow up services at [facility]    Pt interventions:  Reviewed information about deep breathing, grief, and self care. Pt Behavioral Change Plan:     1. Pt will practice deep breathing exercises 2-3x's daily for 3-5 minutes. 2. Pt will familiarize self with charlie [employer]. 3. Pt will promote effective self care management daily-sleep, increased activities, supportive relationships, relaxation. 4. Pt was advised of indications of depressive symptoms and instructed to monitor if symptoms worsen or interfere with daily functioning, to consider following-up with either your primary care team or a behavioral health provider for possible counseling or medication management. If suicidal thoughts are experienced, call 911. An additional 24/7 resource is the [address] at 4-524-325-YZJG ([phone]). 5.  A follow up appointment is scheduled for in four weeks.

## 2021-04-27 ENCOUNTER — HOSPITAL ENCOUNTER (OUTPATIENT)
Dept: MRI IMAGING | Age: 41
Discharge: HOME | End: 2021-04-27
Payer: MEDICARE

## 2021-04-27 DIAGNOSIS — R29.898 WEAKNESS OF BOTH HANDS: ICD-10-CM

## 2021-04-27 DIAGNOSIS — M54.12 CHRONIC CERVICAL RADICULOPATHY: ICD-10-CM

## 2021-04-27 DIAGNOSIS — R20.0 NUMBNESS AND TINGLING IN BOTH HANDS: ICD-10-CM

## 2021-04-27 DIAGNOSIS — R20.2 NUMBNESS AND TINGLING IN BOTH HANDS: ICD-10-CM

## 2021-04-27 PROCEDURE — 72141 MRI NECK SPINE W/O DYE: CPT

## 2021-04-29 ENCOUNTER — PATIENT MESSAGE (OUTPATIENT)
Dept: FAMILY MEDICINE CLINIC | Age: 41
End: 2021-04-29

## 2021-04-29 DIAGNOSIS — R20.0 NUMBNESS AND TINGLING IN BOTH HANDS: Primary | ICD-10-CM

## 2021-04-29 DIAGNOSIS — R20.2 NUMBNESS AND TINGLING IN BOTH HANDS: Primary | ICD-10-CM

## 2021-04-29 NOTE — TELEPHONE ENCOUNTER
From: Breanne Clements  To: EVA Burns CNP  Sent: 4/29/2021 11:14 AM EDT  Subject: Visit Follow-Up Question    What's causing the numbness in my hands.  I haven't had feeling all week and when I do it's pins n needles

## 2021-04-30 RX ORDER — AMITRIPTYLINE HYDROCHLORIDE 50 MG/1
50 TABLET, FILM COATED ORAL NIGHTLY
Qty: 30 TABLET | Refills: 0 | Status: SHIPPED | OUTPATIENT
Start: 2021-04-30 | End: 2021-05-24 | Stop reason: SDUPTHER

## 2021-05-03 ENCOUNTER — PATIENT MESSAGE (OUTPATIENT)
Dept: FAMILY MEDICINE CLINIC | Age: 41
End: 2021-05-03

## 2021-05-03 DIAGNOSIS — R20.0 NUMBNESS OF FINGERS OF BOTH HANDS: ICD-10-CM

## 2021-05-03 DIAGNOSIS — R20.0 NUMBNESS AND TINGLING IN BOTH HANDS: Primary | ICD-10-CM

## 2021-05-03 DIAGNOSIS — R20.2 NUMBNESS AND TINGLING IN BOTH HANDS: Primary | ICD-10-CM

## 2021-05-03 NOTE — TELEPHONE ENCOUNTER
From: Azalia Gilford  To: EVA Mcnally CNP  Sent: 5/3/2021 6:54 AM EDT  Subject: Visit Follow-Up Question    Anytime after 4 would be great or as close to 4 as we can get.

## 2021-05-04 ENCOUNTER — VIRTUAL VISIT (OUTPATIENT)
Dept: BEHAVIORAL/MENTAL HEALTH CLINIC | Age: 41
End: 2021-05-04
Payer: MEDICARE

## 2021-05-04 DIAGNOSIS — F43.21 GRIEF REACTION: Primary | ICD-10-CM

## 2021-05-04 DIAGNOSIS — F43.9 TRAUMA AND STRESSOR-RELATED DISORDER: ICD-10-CM

## 2021-05-04 DIAGNOSIS — F33.2 SEVERE EPISODE OF RECURRENT MAJOR DEPRESSIVE DISORDER, WITHOUT PSYCHOTIC FEATURES (HCC): ICD-10-CM

## 2021-05-04 DIAGNOSIS — J42 CHRONIC BRONCHITIS, UNSPECIFIED CHRONIC BRONCHITIS TYPE (HCC): ICD-10-CM

## 2021-05-04 DIAGNOSIS — F41.1 GAD (GENERALIZED ANXIETY DISORDER): ICD-10-CM

## 2021-05-04 PROCEDURE — 90832 PSYTX W PT 30 MINUTES: CPT | Performed by: SOCIAL WORKER

## 2021-05-04 PROCEDURE — 4004F PT TOBACCO SCREEN RCVD TLK: CPT | Performed by: SOCIAL WORKER

## 2021-05-04 RX ORDER — KETOROLAC TROMETHAMINE 10 MG/1
10 TABLET, FILM COATED ORAL EVERY 8 HOURS PRN
Qty: 20 TABLET | Refills: 0 | Status: SHIPPED | OUTPATIENT
Start: 2021-05-04 | End: 2021-06-02 | Stop reason: SDUPTHER

## 2021-05-04 RX ORDER — ALBUTEROL SULFATE 90 UG/1
1-2 AEROSOL, METERED RESPIRATORY (INHALATION) EVERY 6 HOURS PRN
Qty: 18 G | Refills: 3 | Status: SHIPPED | OUTPATIENT
Start: 2021-05-04 | End: 2022-01-06 | Stop reason: SDUPTHER

## 2021-05-04 RX ORDER — CLONAZEPAM 1 MG/1
1 TABLET ORAL NIGHTLY PRN
Qty: 30 TABLET | Refills: 0 | Status: SHIPPED | OUTPATIENT
Start: 2021-05-04 | End: 2021-06-02 | Stop reason: SDUPTHER

## 2021-05-04 NOTE — PROGRESS NOTES
Behavioral Health Consultation  TOSHA Palomino-S  5/4/2021  4:05 PM EDT  This note will not be viewable in [employer] for the following reason(s). This is a Psychotherapy Note. Time spent with Patient: 26 minutes  This is patient's 12th [facility] appointment. Reason for Consult:    Chief Complaint   Patient presents with    Depression    Anxiety    Other     grief reaction     Referring Provider: ABEBE Wolf  Pt provided informed consent for the behavioral health program. Discussed with patient model of service to include the limits of confidentiality (i.e. abuse reporting, suicide intervention, etc.) and short-term intervention focused approach. Pt indicated understanding. Feedback given to PCP. [facility]  [facility]  Dept: [phone]  Dept Fax: [phone]    TELEHEALTH EVALUATION -- Audio/Visual (During IWBH-41 public health emergency)  This visit was performed via a synchronous telecommunication system. Pt location: [facility]  Provider location: [facility] ([address] [place] [address])  Pt notified that this was a virtual visit and that we will submit a claim for reimbursement to their insurance company. They were made aware that any copays, coinsurance amounts, or other amounts not covered will be their responsibility. Pt accepted?  yes  1  Pursuant to the emergency declaration under the [facility], 1135 waiver authority and the [employer] Act, this Virtual  Visit was conducted, with patient's consent, to reduce the patient's risk of exposure to COVID-19 and provide continuity of care for an established patient. Services were provided through a video synchronous discussion virtually to substitute for in-person clinic visit. S:  Pt assessed slight improvements in symptoms of grief, anxiety, and depression.  This is evident in a decrease in frequency and intensity of symptoms. She processed a shift in her perspective on grief, in acknowledging she wasn't responsible or at fault for her 's untimely death. Pt reviewed concepts of practicing forgiveness, mindfulness, and acceptance in support of taking daily steps towards healing and resiliency. She identified that her job as been helpful in getting lost in her work. Pt reviewed that her safety plan continues to be followed, but her sense of hopelessness is occurring at a lesser frequency. She reviewed her use of daily self calming skills , reaching out for help as needed, and prioritizing good self care. Pt was advised of indications of depressive symptoms and instructed to monitor if symptoms worsen or interfere with daily functioning, to consider following-up with either your primary care team or a behavioral health provider for possible counseling or medication management. If suicidal thoughts are experienced, call 911. An additional 24/7 resource is the [facility] at [phone]-QFWX ([phone]). Pt will attend a follow up appointment in four weeks. She will seek support at [facility] if suicidal ideations are experienced and will attend follow up appointments with them. O:  MSE:    Appearance    calm  Appetite normal  Sleep disturbance Yes  Fatigue Yes  Loss of pleasure Yes  Impulsive behavior No  Speech    normal rate  Mood    Depressed  Affect    normal affect  Thought Content    intact  Thought Process    coherent  Associations    logical connections  Insight    Fair  Judgment    Intact  Orientation    oriented to person, place, time, and general circumstances  Memory    recent and remote memory intact  Attention/Concentration    intact  Morbid ideation No  Suicide Assessment    Hx of suicidal ideations; fleeting thoughts of being better off dead, no intention or desire. Strong protective factors of responsibility to her family.  Pt will continue to follow safety plan and seek support at [facility] as needed for suicidal ideations. History:    TOBACCO:   reports that she has been smoking cigarettes. She has been smoking about 0.50 packs per day. She has never used smokeless tobacco.  ETOH:   reports no history of alcohol use. Family History:   Family History   Problem Relation Age of Onset    Depression Mother    Trang Lynne Migraines Mother     Cancer Father         Prostate and Testicular Cancer    Depression Father     Heart Disease Father     Migraines Father     Heart Disease Maternal Grandfather     Heart Disease Paternal Grandfather        A:     Diagnosis:    1. Grief reaction    2. Severe episode of recurrent major depressive disorder, without psychotic features ([facility]    3. ANTHONY (generalized anxiety disorder)          Diagnosis Date    Anxiety     Asthma     [facility]     Depression     Migraines        Plan: Pt will attend a follow up appointment in four weeks  to assess symptoms and evaluate effectiveness of coping skills. She will attend follow up services at [facility]    Pt interventions:  Reviewed information about deep breathing, grief, and self care. Pt Behavioral Change Plan:     1. Pt will practice deep breathing exercises 2-3x's daily for 3-5 minutes. 2. Pt will familiarize self with charlie [employer]. 3. Pt will promote effective self care management daily-sleep, increased activities, supportive relationships, relaxation. 4. Pt was advised of indications of depressive symptoms and instructed to monitor if symptoms worsen or interfere with daily functioning, to consider following-up with either your primary care team or a behavioral health provider for possible counseling or medication management. If suicidal thoughts are experienced, call 911. An additional 24/7 resource is the [address] at [phone]-NQQQ ([phone]). 5.  A follow up appointment is scheduled for in four weeks.

## 2021-05-12 ENCOUNTER — PROCEDURE VISIT (OUTPATIENT)
Dept: NEUROLOGY | Age: 41
End: 2021-05-12
Payer: MEDICARE

## 2021-05-12 DIAGNOSIS — M54.12 CERVICAL RADICULOPATHY: ICD-10-CM

## 2021-05-12 DIAGNOSIS — R20.0 BILATERAL HAND NUMBNESS: ICD-10-CM

## 2021-05-12 DIAGNOSIS — M54.2 NECK PAIN: ICD-10-CM

## 2021-05-12 DIAGNOSIS — G56.01 RIGHT CARPAL TUNNEL SYNDROME: Primary | ICD-10-CM

## 2021-05-12 PROCEDURE — 95911 NRV CNDJ TEST 9-10 STUDIES: CPT | Performed by: PSYCHIATRY & NEUROLOGY

## 2021-05-12 PROCEDURE — 95886 MUSC TEST DONE W/N TEST COMP: CPT | Performed by: PSYCHIATRY & NEUROLOGY

## 2021-05-17 ENCOUNTER — OFFICE VISIT (OUTPATIENT)
Dept: PHYSICAL MEDICINE AND REHAB | Age: 41
End: 2021-05-17
Payer: MEDICARE

## 2021-05-17 VITALS
WEIGHT: 173 LBS | HEIGHT: 60 IN | DIASTOLIC BLOOD PRESSURE: 82 MMHG | BODY MASS INDEX: 33.96 KG/M2 | SYSTOLIC BLOOD PRESSURE: 108 MMHG

## 2021-05-17 DIAGNOSIS — M54.2 PAIN OF CERVICAL FACET JOINT: Primary | ICD-10-CM

## 2021-05-17 DIAGNOSIS — G44.86 CERVICOGENIC HEADACHE: ICD-10-CM

## 2021-05-17 DIAGNOSIS — G89.29 OTHER CHRONIC PAIN: ICD-10-CM

## 2021-05-17 DIAGNOSIS — M79.18 MYOFASCIAL PAIN: ICD-10-CM

## 2021-05-17 PROCEDURE — G8427 DOCREV CUR MEDS BY ELIG CLIN: HCPCS | Performed by: ANESTHESIOLOGY

## 2021-05-17 PROCEDURE — 99215 OFFICE O/P EST HI 40 MIN: CPT | Performed by: ANESTHESIOLOGY

## 2021-05-17 PROCEDURE — G8417 CALC BMI ABV UP PARAM F/U: HCPCS | Performed by: ANESTHESIOLOGY

## 2021-05-17 PROCEDURE — 4004F PT TOBACCO SCREEN RCVD TLK: CPT | Performed by: ANESTHESIOLOGY

## 2021-05-17 NOTE — PATIENT INSTRUCTIONS
The following treatment recommendations and plan were discussed in detail with Lala Giraldo. Imaging:   I have reviewed patients imaging of MRI C-spine and results were discussed with patient today. Analgesics:   None; patient has allergies to acetaminophen and multiple NSAIDs. Adjuvants: In light of the presence of a neuropathic component of pain, patient is advised to continue gabapentin. Interventions: In presence of cervical neck and with physical exam consistent for facetal pain, the option of RIGHT C2/C3, C3/C4, and C4/C5 was chosen. The risks and benefits were discussed in detail with the patient. Patient wants to proceed with the injection. Anticoagulation/NPO Recommendations:   Patient does not need to hold any medications prior to the procedure. Patient will need to be NPO x 8 hours prior to the procedure. We will start an IV prior to the procedure    Multidisciplinary Pain Management:   In the presence of complex, chronic, and multi-factorial pain, the importance of a multidisciplinary approach to pain management in the patients management regimen was emphasized and discussed in great detail. PHYSICAL THERAPY: Patient is advised to see a physical therapist for gentle stretching exercises and conditioning exercises for management of pain.      Referrals:  None    Prescriptions Written This Visit:   None    Follow-up: For RIGHT cervical MBBs
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
## 2021-05-17 NOTE — PROGRESS NOTES
Chronic Pain/PM&R Clinic Note     Encounter Date: 5/17/21    Subjective:   Chief Complaint:   Chief Complaint   Patient presents with    Follow-up       History of Present Illness:   Lala Giraldo is a 37 y.o. female seen in the clinic initially on 05/17/21 upon request from  for her history of  pain.          Patient wants to switch to one of our neurologist locally.      We discussed options, including interventional pain procedures, but she is reluctant to do that.      Apparently, she is approved for Botox injection, but she had a reaction prior.      Still getting her rx from [facility] to my understanding, and I recommended her to keep the physician-patient relationship for the meantime until Dr. Isa Colunga consult.      In the interim, I still think a TON block might give her some relief, in addition to migrain headache management,  And she will think about it for now.      Further assessment and followup in  4 weeks for re-evaluation of new treatment(s).       Time spent with patient was 45 minutes. More than 50% was spent counseling/coordinating the patient's care. Trial of C2-3-4 medial branch block, right side. He tried PT, medication management.        History of Interventions:   Surgery: No previous lumbar/cervical surgeries  Injections: None    Current Treatment Medications:       Historical Treatment Medications:     Imaging:      Past Medical History:   Diagnosis Date    Anxiety     Asthma     COPD (chronic obstructive pulmonary disease) ([facility] [address]     Depression     Migraines        Past Surgical History:   Procedure Laterality Date    CHOLECYSTECTOMY  2003    DILATION AND CURETTAGE OF UTERUS  2009    HYSTERECTOMY  2011    IGS ENDO SINUS SX  07/12/2017    IGS Endoscopic Ethmoidectomy, Anterior and Posterior Bilateral, Bilateral Maxillary Antrostomy, Julisa Bullosa Resection Left Middle Turbinate Septoplasty by Dr Anali Melo 37 y.o.female presenting to the pain clinic for evaluation of       Plan: The following treatment recommendations and plan were discussed in detail with Nilay Plascencia. Imaging:   I have reviewed patients imaging of MRI C-spine and results were discussed with patient today. Analgesics:   None; patient has allergies to acetaminophen and multiple NSAIDs. Adjuvants: In light of the presence of a neuropathic component of pain, patient is advised to continue gabapentin. Interventions: In presence of cervical neck and with physical exam consistent for facetal pain, the option of medial branch blocks at RIGHT C2/C3 (TON), C3/C4, and C4/C5 was chosen. The risks and benefits were discussed in detail with the patient. Patient wants to proceed with the injection. Anticoagulation/NPO Recommendations:   Patient does not need to hold any medications prior to the procedure. Patient will need to be NPO x 8 hours prior to the procedure. We will start an IV prior to the procedure    Multidisciplinary Pain Management:   In the presence of complex, chronic, and multi-factorial pain, the importance of a multidisciplinary approach to pain management in the patients management regimen was emphasized and discussed in great detail. PHYSICAL THERAPY: Patient is advised to see a physical therapist for gentle stretching exercises and conditioning exercises for management of pain.      Referrals:  None    Prescriptions Written This Visit:   None    Follow-up: For RIGHT cervical MBBs      Pablo Martinez, DO  Interventional Pain Management/PM&R   [place]

## 2021-05-23 ENCOUNTER — PATIENT MESSAGE (OUTPATIENT)
Dept: FAMILY MEDICINE CLINIC | Age: 41
End: 2021-05-23

## 2021-05-23 DIAGNOSIS — R20.0 NUMBNESS AND TINGLING IN BOTH HANDS: Primary | ICD-10-CM

## 2021-05-23 DIAGNOSIS — R20.2 NUMBNESS AND TINGLING IN BOTH HANDS: Primary | ICD-10-CM

## 2021-05-24 RX ORDER — AMITRIPTYLINE HYDROCHLORIDE 50 MG/1
50 TABLET, FILM COATED ORAL 2 TIMES DAILY
Qty: 60 TABLET | Refills: 1 | Status: SHIPPED | OUTPATIENT
Start: 2021-05-24 | End: 2021-07-23

## 2021-05-27 ENCOUNTER — HOSPITAL ENCOUNTER (EMERGENCY)
Age: 41
Discharge: HOME | End: 2021-05-27
Payer: MEDICARE

## 2021-05-27 VITALS
BODY MASS INDEX: 33.96 KG/M2 | HEART RATE: 79 BPM | HEIGHT: 60 IN | OXYGEN SATURATION: 99 % | SYSTOLIC BLOOD PRESSURE: 112 MMHG | RESPIRATION RATE: 16 BRPM | DIASTOLIC BLOOD PRESSURE: 80 MMHG | WEIGHT: 173 LBS | TEMPERATURE: 97.1 F

## 2021-05-27 DIAGNOSIS — G56.01 CARPAL TUNNEL SYNDROME OF RIGHT WRIST: Primary | ICD-10-CM

## 2021-05-27 PROCEDURE — 6360000002 HC RX W HCPCS: Performed by: NURSE PRACTITIONER

## 2021-05-27 PROCEDURE — 99213 OFFICE O/P EST LOW 20 MIN: CPT | Performed by: NURSE PRACTITIONER

## 2021-05-27 PROCEDURE — 99213 OFFICE O/P EST LOW 20 MIN: CPT

## 2021-05-27 PROCEDURE — 96372 THER/PROPH/DIAG INJ SC/IM: CPT

## 2021-05-27 RX ORDER — KETOROLAC TROMETHAMINE 30 MG/ML
60 INJECTION, SOLUTION INTRAMUSCULAR; INTRAVENOUS ONCE
Status: COMPLETED | OUTPATIENT
Start: 2021-05-27 | End: 2021-05-27

## 2021-05-27 RX ORDER — PREDNISONE 20 MG/1
TABLET ORAL
Qty: 24 TABLET | Refills: 0 | Status: SHIPPED | OUTPATIENT
Start: 2021-05-27 | End: 2021-06-08

## 2021-05-27 RX ADMIN — KETOROLAC TROMETHAMINE 60 MG: 30 INJECTION, SOLUTION INTRAMUSCULAR at 10:57

## 2021-05-27 ASSESSMENT — ENCOUNTER SYMPTOMS
SHORTNESS OF BREATH: 0
WHEEZING: 0
COUGH: 0
STRIDOR: 0
COLOR CHANGE: 0
CHEST TIGHTNESS: 0
APNEA: 0
CHOKING: 0
BACK PAIN: 0

## 2021-05-27 ASSESSMENT — PAIN SCALES - GENERAL
PAINLEVEL_OUTOF10: 6
PAINLEVEL_OUTOF10: 9
PAINLEVEL_OUTOF10: 9

## 2021-05-27 ASSESSMENT — PAIN DESCRIPTION - ORIENTATION: ORIENTATION: RIGHT

## 2021-05-27 ASSESSMENT — PAIN DESCRIPTION - LOCATION: LOCATION: ARM
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
## 2021-05-27 NOTE — ED TRIAGE NOTES
To room 1 c/o right arm hand pain elbow down. Jessica been dx with carpal tunnel.     \"I cant oick rx for toradol up until the 5th next month\"

## 2021-05-28 ENCOUNTER — PATIENT MESSAGE (OUTPATIENT)
Dept: FAMILY MEDICINE CLINIC | Age: 41
End: 2021-05-28

## 2021-06-02 DIAGNOSIS — F43.9 TRAUMA AND STRESSOR-RELATED DISORDER: ICD-10-CM

## 2021-06-03 RX ORDER — CLONAZEPAM 1 MG/1
1 TABLET ORAL NIGHTLY PRN
Qty: 30 TABLET | Refills: 0 | Status: SHIPPED | OUTPATIENT
Start: 2021-06-03 | End: 2021-07-23

## 2021-06-03 RX ORDER — KETOROLAC TROMETHAMINE 10 MG/1
10 TABLET, FILM COATED ORAL EVERY 8 HOURS PRN
Qty: 20 TABLET | Refills: 0 | Status: SHIPPED | OUTPATIENT
Start: 2021-06-03 | End: 2021-07-01 | Stop reason: SDUPTHER

## 2021-06-13 ENCOUNTER — PATIENT MESSAGE (OUTPATIENT)
Dept: FAMILY MEDICINE CLINIC | Age: 41
End: 2021-06-13

## 2021-06-13 DIAGNOSIS — K04.7 DENTAL INFECTION: Primary | ICD-10-CM

## 2021-06-14 RX ORDER — AMOXICILLIN 875 MG/1
875 TABLET, COATED ORAL 2 TIMES DAILY
Qty: 20 TABLET | Refills: 0 | Status: SHIPPED | OUTPATIENT
Start: 2021-06-14 | End: 2021-06-24

## 2021-06-14 NOTE — TELEPHONE ENCOUNTER
From: Genoveva Barcenas  To: EVA Hall - CNP  Sent: 6/13/2021 12:20 PM EDT  Subject: Prescription Question    I am in need of amoxicillin.  My tooth is broken n I can't get to [facility] until July for my dentist.

## 2021-07-01 ENCOUNTER — HOSPITAL ENCOUNTER (EMERGENCY)
Age: 41
Discharge: LEFT BEFORE DISCHARGE | End: 2021-07-01
Attending: EMERGENCY MEDICINE
Payer: COMMERCIAL

## 2021-07-01 VITALS
RESPIRATION RATE: 16 BRPM | TEMPERATURE: 97.9 F | BODY MASS INDEX: 30.43 KG/M2 | HEIGHT: 60 IN | OXYGEN SATURATION: 95 % | SYSTOLIC BLOOD PRESSURE: 118 MMHG | HEART RATE: 65 BPM | DIASTOLIC BLOOD PRESSURE: 87 MMHG | WEIGHT: 155 LBS

## 2021-07-01 DIAGNOSIS — R42 DIZZINESS: Primary | ICD-10-CM
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
## 2021-07-01 DIAGNOSIS — G44.319 ACUTE POST-TRAUMATIC HEADACHE, NOT INTRACTABLE: ICD-10-CM

## 2021-07-01 PROCEDURE — 99283 EMERGENCY DEPT VISIT LOW MDM: CPT

## 2021-07-01 RX ORDER — KETOROLAC TROMETHAMINE 10 MG/1
10 TABLET, FILM COATED ORAL EVERY 8 HOURS PRN
Qty: 20 TABLET | Refills: 0 | Status: SHIPPED | OUTPATIENT
Start: 2021-07-01 | End: 2021-08-10 | Stop reason: SDUPTHER

## 2021-07-01 ASSESSMENT — PAIN DESCRIPTION - ONSET: ONSET: ON-GOING

## 2021-07-01 ASSESSMENT — PAIN SCALES - GENERAL
PAINLEVEL_OUTOF10: 10
PAINLEVEL_OUTOF10: 10

## 2021-07-01 ASSESSMENT — ENCOUNTER SYMPTOMS
COUGH: 0
SORE THROAT: 0
NAUSEA: 0
VOMITING: 0
SHORTNESS OF BREATH: 0

## 2021-07-01 ASSESSMENT — PAIN DESCRIPTION - LOCATION
LOCATION: SHOULDER
LOCATION: SHOULDER

## 2021-07-01 ASSESSMENT — PAIN DESCRIPTION - DESCRIPTORS: DESCRIPTORS: SHARP

## 2021-07-01 ASSESSMENT — PAIN DESCRIPTION - PROGRESSION: CLINICAL_PROGRESSION: GRADUALLY WORSENING

## 2021-07-01 ASSESSMENT — PAIN DESCRIPTION - PAIN TYPE: TYPE: ACUTE PAIN

## 2021-07-01 ASSESSMENT — PAIN DESCRIPTION - ORIENTATION
ORIENTATION: RIGHT
ORIENTATION: RIGHT

## 2021-07-01 ASSESSMENT — PAIN DESCRIPTION - FREQUENCY: FREQUENCY: CONTINUOUS
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
## 2021-07-01 NOTE — ED NOTES
Po Tran CNP calls report to [facility] regarding transfer     Ryland Norman RN  07/01/21 1500
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
## 2021-07-02 ENCOUNTER — TELEPHONE (OUTPATIENT)
Dept: FAMILY MEDICINE CLINIC | Age: 41
End: 2021-07-02

## 2021-07-02 NOTE — TELEPHONE ENCOUNTER
See UC and ED notes from yesterday. Pt says she was sent to the ED for a CT scan. Pt left the ED before the testing was done because pt says she sat there for 2hrs without anyone coming to check on her after the initial workup. Pt had to get her kids. Pt is at work currently. Pt asked that you order the CT. Please advise.

## 2021-07-02 NOTE — TELEPHONE ENCOUNTER
I will not order a test in which I have not evaluated patient for to determine if test is needed. If that severe need to go back to ED as it will be 1-2 weeks if I order CT as OP any ways.   Schedule appt next week if symptoms continue

## 2021-07-15 ENCOUNTER — HOSPITAL ENCOUNTER (EMERGENCY)
Age: 41
Discharge: HOME | End: 2021-07-15
Payer: MEDICARE

## 2021-07-15 VITALS
TEMPERATURE: 98.4 F | BODY MASS INDEX: 31.41 KG/M2 | OXYGEN SATURATION: 98 % | SYSTOLIC BLOOD PRESSURE: 127 MMHG | DIASTOLIC BLOOD PRESSURE: 85 MMHG | HEART RATE: 74 BPM | RESPIRATION RATE: 16 BRPM | WEIGHT: 160 LBS | HEIGHT: 60 IN

## 2021-07-15 DIAGNOSIS — Z02.89 ENCOUNTER TO OBTAIN EXCUSE FROM WORK: ICD-10-CM

## 2021-07-15 DIAGNOSIS — G43.809 OTHER MIGRAINE WITHOUT STATUS MIGRAINOSUS, NOT INTRACTABLE: Primary | ICD-10-CM

## 2021-07-15 PROCEDURE — 6370000000 HC RX 637 (ALT 250 FOR IP): Performed by: NURSE PRACTITIONER

## 2021-07-15 PROCEDURE — 96372 THER/PROPH/DIAG INJ SC/IM: CPT

## 2021-07-15 PROCEDURE — 99213 OFFICE O/P EST LOW 20 MIN: CPT | Performed by: NURSE PRACTITIONER

## 2021-07-15 PROCEDURE — 99213 OFFICE O/P EST LOW 20 MIN: CPT

## 2021-07-15 PROCEDURE — 6360000002 HC RX W HCPCS: Performed by: NURSE PRACTITIONER

## 2021-07-15 RX ORDER — METHYLPREDNISOLONE ACETATE 80 MG/ML
120 INJECTION, SUSPENSION INTRA-ARTICULAR; INTRALESIONAL; INTRAMUSCULAR; SOFT TISSUE ONCE
Status: COMPLETED | OUTPATIENT
Start: 2021-07-15 | End: 2021-07-15

## 2021-07-15 RX ORDER — METHYLPREDNISOLONE 4 MG/1
TABLET ORAL
Qty: 1 KIT | Refills: 0 | Status: SHIPPED | OUTPATIENT
Start: 2021-07-15 | End: 2021-07-21

## 2021-07-15 RX ORDER — ONDANSETRON 4 MG/1
4 TABLET, ORALLY DISINTEGRATING ORAL ONCE
Status: COMPLETED | OUTPATIENT
Start: 2021-07-15 | End: 2021-07-15

## 2021-07-15 RX ADMIN — ONDANSETRON 4 MG: 4 TABLET, ORALLY DISINTEGRATING ORAL at 11:11

## 2021-07-15 RX ADMIN — METHYLPREDNISOLONE ACETATE 120 MG: 80 INJECTION, SUSPENSION INTRA-ARTICULAR; INTRALESIONAL; INTRAMUSCULAR; SOFT TISSUE at 11:12

## 2021-07-15 ASSESSMENT — PAIN DESCRIPTION - PROGRESSION: CLINICAL_PROGRESSION: GRADUALLY WORSENING

## 2021-07-15 ASSESSMENT — PAIN DESCRIPTION - FREQUENCY: FREQUENCY: CONTINUOUS

## 2021-07-15 ASSESSMENT — PAIN - FUNCTIONAL ASSESSMENT: PAIN_FUNCTIONAL_ASSESSMENT: ACTIVITIES ARE NOT PREVENTED

## 2021-07-15 ASSESSMENT — PAIN DESCRIPTION - LOCATION: LOCATION: HEAD

## 2021-07-15 ASSESSMENT — PAIN DESCRIPTION - PAIN TYPE: TYPE: CHRONIC PAIN

## 2021-07-15 ASSESSMENT — ENCOUNTER SYMPTOMS
DIARRHEA: 0
EYE ITCHING: 0
VOMITING: 0
SHORTNESS OF BREATH: 0
NAUSEA: 1
PHOTOPHOBIA: 1
COUGH: 0
ABDOMINAL PAIN: 0
EYE REDNESS: 0

## 2021-07-15 ASSESSMENT — PAIN DESCRIPTION - DESCRIPTORS: DESCRIPTORS: HEADACHE

## 2021-07-15 ASSESSMENT — PAIN DESCRIPTION - ONSET: ONSET: GRADUAL

## 2021-07-15 ASSESSMENT — PAIN SCALES - GENERAL: PAINLEVEL_OUTOF10: 10

## 2021-07-15 NOTE — ED TRIAGE NOTES
Pt to [facility] ambulatory with a headache. Pain started around 0345. Pt needs a work note.

## 2021-07-15 NOTE — ED PROVIDER NOTES
[address] Encounter      CHIEFCOMPLAINT       Chief Complaint   Patient presents with    Headache    Letter for School/Work       Nurses Notes reviewed and I agree except as noted in the HPI. HISTORY OF PRESENT ILLNESS   Christiano Gonzalez is a 37 y.o. female who presents for evaluation of migraine that started around 330 this morning. She states that she has a history of migraines, and her symptoms today are consistent with those in the past.  Missed work today. She states that her neck and shoulders feel tight and is requesting steroids as they have helped her in the past.  Follows with her PCP, neurology, and has been referred to pain management. No loss of  bowel or bladder. REVIEW OF SYSTEMS     Review of Systems   Constitutional: Negative for chills, fatigue and fever. Eyes: Positive for photophobia. Negative for redness, itching and visual disturbance. Respiratory: Negative for cough and shortness of breath. Cardiovascular: Negative for chest pain. Gastrointestinal: Positive for nausea. Negative for abdominal pain, diarrhea and vomiting. Skin: Negative for rash. Allergic/Immunologic: Negative for environmental allergies and food allergies. Neurological: Positive for headaches. Negative for dizziness. PAST MEDICAL HISTORY         Diagnosis Date    Anxiety     Asthma     COPD (chronic obstructive pulmonary disease) ([facility] [address]     Depression     Migraines        SURGICAL HISTORY     Patient  has a past surgical history that includes Tonsillectomy and adenoidectomy (1989); Ovary removal (2000); Cholecystectomy (2003); Tubal ligation (2005); Hysterectomy (2011); Dilation and curettage of uterus (2009); IGS Endo Sinus Sx (07/12/2017); and Tooth Extraction.     CURRENT MEDICATIONS       Previous Medications    ALBUTEROL (PROVENTIL) (2.5 MG/3ML) 0.083% NEBULIZER SOLUTION    Take 3 mLs by nebulization every 6 hours as needed for Wheezing ALBUTEROL SULFATE HFA (VENTOLIN HFA) 108 (90 BASE) MCG/ACT INHALER    Inhale 1-2 puffs into the lungs every 6 hours as needed for Wheezing    AMITRIPTYLINE (ELAVIL) 50 MG TABLET    Take 1 tablet by mouth 2 times daily    CLONAZEPAM (KLONOPIN) 1 MG TABLET    Take 1 tablet by mouth nightly as needed for Anxiety for up to 30 days. DIVALPROEX (DEPAKOTE ER) 500 MG EXTENDED RELEASE TABLET    TAKE 1 TABLET BY MOUTH ONCE BID    GABAPENTIN (NEURONTIN) 800 MG TABLET    Take 1 tablet by mouth 4 times daily for 180 days. KETOROLAC (TORADOL) 10 MG TABLET    Take 1 tablet by mouth every 8 hours as needed for Pain    MEDICAL MARIJUANA    Take by mouth as needed. MULTIPLE VITAMINS-CALCIUM (ONE-A-DAY WOMENS PO)    Take by mouth daily     OXYBUTYNIN (DITROPAN-XL) 10 MG EXTENDED RELEASE TABLET    Take 1 tablet by mouth daily    QUETIAPINE (SEROQUEL) 200 MG TABLET    Take 1 tablet by mouth nightly    RESPIRATORY THERAPY SUPPLIES (NEBULIZER COMPRESSOR) KIT    1 kit by Does not apply route once for 1 dose    SALINE NASAL GEL (AYR) GEL    by Nasal route daily as needed for Congestion    SYMBICORT 80-4.5 MCG/ACT AERO    INHALE 2 PUFFS BY MOUTH TWICE DAILY, RINSE MOUTH AFTER USE    VILAZODONE HCL (VILAZODONE HCL) 10 MG TABS    Take 1 tablet by mouth once daily       ALLERGIES     Patient is is allergic to botox [onabotulinumtoxina (cosmetic)], other, topamax [topiramate], acetaminophen, aspirin, and ibuprofen. FAMILY HISTORY     Patient'sfamily history includes Cancer in her father; Depression in her father and mother; Heart Disease in her father, maternal grandfather, and paternal grandfather; Migraines in her father and mother. SOCIAL HISTORY     Patient  reports that she has been smoking cigarettes. She has been smoking about 0.50 packs per day. She has never used smokeless tobacco. She reports current drug use. Drug: Marijuana. She reports that she does not drink alcohol.     PHYSICAL EXAM     ED TRIAGE VITALS  BP: 119/83, Temp: 98.4 °F (36.9 °C), Pulse: 88, Resp: 18, SpO2: 96 %  Physical Exam  Vitals and nursing note reviewed. Constitutional:       General: She is not in acute distress. Appearance: Normal appearance. She is well-developed and well-groomed. HENT:      Head: Normocephalic and atraumatic. Right Ear: External ear normal.      Left Ear: External ear normal.      Mouth/Throat:      Lips: Pink. Mouth: Mucous membranes are moist.   Eyes:      Conjunctiva/sclera: Conjunctivae normal.      Right eye: Right conjunctiva is not injected. Left eye: Left conjunctiva is not injected. Pupils: Pupils are equal, round, and reactive to light. Pupils are equal.   Cardiovascular:      Rate and Rhythm: Normal rate. Heart sounds: Normal heart sounds. Pulmonary:      Effort: Pulmonary effort is normal. No respiratory distress. Breath sounds: Normal breath sounds. Musculoskeletal:      Cervical back: Normal range of motion. Skin:     General: Skin is warm and dry. Findings: No rash (on exposed surfaces). Neurological:      Mental Status: She is alert and oriented to person, place, and time. Cranial Nerves: No cranial nerve deficit. Psychiatric:         Mood and Affect: Mood normal.         Speech: Speech normal.         Behavior: Behavior is cooperative. DIAGNOSTIC RESULTS   Labs:  Abnormal Labs Reviewed - No data to display     IMAGING:  No orders to display     URGENT CARE COURSE:     Vitals:    07/15/21 1051   BP: 119/83   Pulse: 88   Resp: 18   Temp: 98.4 °F (36.9 °C)   TempSrc: Temporal   SpO2: 96%   Weight: 160 lb (72.6 kg)   Height: 5' (1.524 m)       Medications   ondansetron (ZOFRAN-ODT) disintegrating tablet 4 mg (4 mg Oral Given 7/15/21 1111)   methylPREDNISolone acetate (DEPO-MEDROL) injection 120 mg (120 mg Intramuscular Given 7/15/21 1112)     PROCEDURES:  FINALIMPRESSION      1. Other migraine without status migrainosus, not intractable    2.  Encounter to obtain excuse from work        DISPOSITION/PLAN   DISPOSITION    Discharge   Physical assessment findings, diagnostic testing(s) if applicable, and vital signs reviewed with patient/patient representative. If applicable, patient/patient representative will be contacted upon receipt of final culture and sensitivity or other testing results when available. Any additions or changes to medications or changes the plan of care will be made at that time. Differential diagnosis(s) discussed with patient/patient representative. Patient is to follow-up with family care provider in 2-3 days if no improvement. Patient is to go to the emergency department if symptoms change/worsen. Patient/patient representative is aware of care plan, questions answered, verbalizes understanding and is in agreement. Printed instructions attached to after visit summary. Problem List Items Addressed This Visit     None      Visit Diagnoses     Other migraine without status migrainosus, not intractable    -  Primary    Relevant Medications    methylPREDNISolone (MEDROL, JENNIFER,) 4 MG tablet    Encounter to obtain excuse from work              PATIENT REFERRED TO:  EVA Mireles - CNP  [address] [facility], [id number] [place] [address]  [address] [facility]    Schedule an appointment as soon as possible for a visit in 3 days  For further evaluation. , If symptoms change/worsen, go to the [facility]  [address] [facility]  [phone]    as needed, If symptoms change/worsen, go to the [address] [facility], [address] Mansi Clemons, APRN - CNP  07/15/21 5750

## 2021-07-16 ENCOUNTER — PATIENT MESSAGE (OUTPATIENT)
Dept: PHYSICAL MEDICINE AND REHAB | Age: 41
End: 2021-07-16

## 2021-07-16 ENCOUNTER — TELEPHONE (OUTPATIENT)
Dept: FAMILY MEDICINE CLINIC | Age: 41
End: 2021-07-16

## 2021-07-16 NOTE — TELEPHONE ENCOUNTER
From: Richard Lopez  To: Bandar Camarillo DO  Sent: 7/16/2021 9:09 AM EDT  Subject: Visit Follow-Up Question    How soon can I get in to get these procedures started. It's gotten way worse.

## 2021-07-22 ENCOUNTER — HOSPITAL ENCOUNTER (EMERGENCY)
Age: 41
Discharge: HOME | End: 2021-07-22
Payer: MEDICARE

## 2021-07-22 VITALS
BODY MASS INDEX: 31.41 KG/M2 | HEART RATE: 74 BPM | OXYGEN SATURATION: 98 % | TEMPERATURE: 97.2 F | SYSTOLIC BLOOD PRESSURE: 121 MMHG | HEIGHT: 60 IN | WEIGHT: 160 LBS | RESPIRATION RATE: 16 BRPM | DIASTOLIC BLOOD PRESSURE: 79 MMHG

## 2021-07-22 DIAGNOSIS — G43.B1 INTRACTABLE OPHTHALMOPLEGIC MIGRAINE: Primary | ICD-10-CM

## 2021-07-22 PROCEDURE — 6360000002 HC RX W HCPCS: Performed by: NURSE PRACTITIONER

## 2021-07-22 PROCEDURE — 6370000000 HC RX 637 (ALT 250 FOR IP): Performed by: NURSE PRACTITIONER

## 2021-07-22 PROCEDURE — 99213 OFFICE O/P EST LOW 20 MIN: CPT

## 2021-07-22 PROCEDURE — 96372 THER/PROPH/DIAG INJ SC/IM: CPT

## 2021-07-22 PROCEDURE — 99213 OFFICE O/P EST LOW 20 MIN: CPT | Performed by: NURSE PRACTITIONER

## 2021-07-22 RX ORDER — ONDANSETRON 4 MG/1
4 TABLET, ORALLY DISINTEGRATING ORAL ONCE
Status: COMPLETED | OUTPATIENT
Start: 2021-07-22 | End: 2021-07-22

## 2021-07-22 RX ORDER — PROMETHAZINE HYDROCHLORIDE 25 MG/1
25 SUPPOSITORY RECTAL EVERY 6 HOURS PRN
Qty: 8 SUPPOSITORY | Refills: 0 | Status: SHIPPED | OUTPATIENT
Start: 2021-07-22 | End: 2021-07-25

## 2021-07-22 RX ORDER — KETOROLAC TROMETHAMINE 30 MG/ML
60 INJECTION, SOLUTION INTRAMUSCULAR; INTRAVENOUS ONCE
Status: COMPLETED | OUTPATIENT
Start: 2021-07-22 | End: 2021-07-22

## 2021-07-22 RX ORDER — HYDROXYZINE 50 MG/1
50 TABLET, FILM COATED ORAL 3 TIMES DAILY PRN
Qty: 30 TABLET | Refills: 0 | Status: SHIPPED | OUTPATIENT
Start: 2021-07-22 | End: 2021-08-01

## 2021-07-22 RX ADMIN — KETOROLAC TROMETHAMINE 60 MG: 30 INJECTION, SOLUTION INTRAMUSCULAR at 10:14

## 2021-07-22 RX ADMIN — ONDANSETRON 4 MG: 4 TABLET, ORALLY DISINTEGRATING ORAL at 10:14

## 2021-07-22 ASSESSMENT — PAIN DESCRIPTION - PAIN TYPE
TYPE: ACUTE PAIN
TYPE: ACUTE PAIN

## 2021-07-22 ASSESSMENT — ENCOUNTER SYMPTOMS
SWOLLEN GLANDS: 0
CHEST TIGHTNESS: 0
SHORTNESS OF BREATH: 0
DIARRHEA: 0
ABDOMINAL PAIN: 0
APNEA: 0
PHOTOPHOBIA: 1
BLURRED VISION: 0
COUGH: 0
VISUAL CHANGE: 0
SORE THROAT: 0
VOMITING: 0
STRIDOR: 0
WHEEZING: 0
EYE PAIN: 1
CHOKING: 0
NAUSEA: 1
BACK PAIN: 0
TINGLING: 0
SINUS PRESSURE: 0

## 2021-07-22 ASSESSMENT — PAIN DESCRIPTION - FREQUENCY: FREQUENCY: CONTINUOUS

## 2021-07-22 ASSESSMENT — PAIN DESCRIPTION - DESCRIPTORS
DESCRIPTORS: ACHING;THROBBING
DESCRIPTORS: HEADACHE

## 2021-07-22 ASSESSMENT — PAIN DESCRIPTION - ONSET: ONSET: ON-GOING

## 2021-07-22 ASSESSMENT — PAIN DESCRIPTION - PROGRESSION: CLINICAL_PROGRESSION: GRADUALLY IMPROVING

## 2021-07-22 ASSESSMENT — PAIN DESCRIPTION - LOCATION
LOCATION: HEAD
LOCATION: HEAD

## 2021-07-22 ASSESSMENT — PAIN SCALES - GENERAL
PAINLEVEL_OUTOF10: 10
PAINLEVEL_OUTOF10: 7

## 2021-07-22 NOTE — TELEPHONE ENCOUNTER
Pt. Contacted. States she now has someone that is available to drive pt to, from and stay on premises during procedure. Procedure and follow up scheduled. Educated on pre-procedure instructions, also mailed. Verbalized understanding.

## 2021-07-22 NOTE — ED TRIAGE NOTES
Pt ambulatory into [facility]uc with c/o migraine headache that started last night at 2030. Pt states she called her PCP but was advised to come here that he could not get her in today. Pt states pain 10 and called off work.

## 2021-07-22 NOTE — TELEPHONE ENCOUNTER
Can you check with Riri where we are at with this? I thought she called the patient already to try and get scheduled.       Felicitas Martin, DO  Interventional Pain Management/PM&R   [place]

## 2021-07-23 ENCOUNTER — TELEPHONE (OUTPATIENT)
Dept: FAMILY MEDICINE CLINIC | Age: 41
End: 2021-07-23

## 2021-07-23 DIAGNOSIS — R20.2 NUMBNESS AND TINGLING IN BOTH HANDS: ICD-10-CM

## 2021-07-23 DIAGNOSIS — F43.9 TRAUMA AND STRESSOR-RELATED DISORDER: ICD-10-CM

## 2021-07-23 DIAGNOSIS — R20.0 NUMBNESS AND TINGLING IN BOTH HANDS: ICD-10-CM

## 2021-07-23 RX ORDER — CLONAZEPAM 1 MG/1
1 TABLET ORAL NIGHTLY PRN
Qty: 30 TABLET | Refills: 0 | Status: SHIPPED | OUTPATIENT
Start: 2021-07-23 | End: 2021-09-11 | Stop reason: SDUPTHER

## 2021-07-23 RX ORDER — AMITRIPTYLINE HYDROCHLORIDE 50 MG/1
TABLET, FILM COATED ORAL
Qty: 60 TABLET | Refills: 0 | Status: SHIPPED | OUTPATIENT
Start: 2021-07-23 | End: 2021-08-13 | Stop reason: SDUPTHER

## 2021-07-23 RX ORDER — GABAPENTIN 800 MG/1
TABLET ORAL
Qty: 360 TABLET | Refills: 1 | Status: SHIPPED | OUTPATIENT
Start: 2021-07-23 | End: 2021-10-04 | Stop reason: SDUPTHER
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
## 2021-08-10 ENCOUNTER — HOSPITAL ENCOUNTER (OUTPATIENT)
Age: 41
Setting detail: OUTPATIENT SURGERY
Discharge: HOME | End: 2021-08-10
Attending: ANESTHESIOLOGY | Admitting: ANESTHESIOLOGY
Payer: MEDICARE

## 2021-08-10 ENCOUNTER — APPOINTMENT (OUTPATIENT)
Dept: GENERAL RADIOLOGY | Age: 41
End: 2021-08-10
Attending: ANESTHESIOLOGY
Payer: MEDICARE

## 2021-08-10 VITALS
WEIGHT: 154.4 LBS | HEIGHT: 60 IN | OXYGEN SATURATION: 95 % | DIASTOLIC BLOOD PRESSURE: 64 MMHG | TEMPERATURE: 97.4 F | SYSTOLIC BLOOD PRESSURE: 107 MMHG | HEART RATE: 74 BPM | BODY MASS INDEX: 30.31 KG/M2 | RESPIRATION RATE: 16 BRPM

## 2021-08-10 PROCEDURE — 2709999900 HC NON-CHARGEABLE SUPPLY: Performed by: ANESTHESIOLOGY

## 2021-08-10 PROCEDURE — 64490 INJ PARAVERT F JNT C/T 1 LEV: CPT | Performed by: ANESTHESIOLOGY

## 2021-08-10 PROCEDURE — 6360000002 HC RX W HCPCS: Performed by: ANESTHESIOLOGY

## 2021-08-10 PROCEDURE — 99152 MOD SED SAME PHYS/QHP 5/>YRS: CPT | Performed by: ANESTHESIOLOGY

## 2021-08-10 PROCEDURE — 7100000010 HC PHASE II RECOVERY - FIRST 15 MIN: Performed by: ANESTHESIOLOGY

## 2021-08-10 PROCEDURE — 64492 INJ PARAVERT F JNT C/T 3 LEV: CPT | Performed by: ANESTHESIOLOGY

## 2021-08-10 PROCEDURE — 3600000056 HC PAIN LEVEL 4 BASE: Performed by: ANESTHESIOLOGY

## 2021-08-10 PROCEDURE — 3600000057 HC PAIN LEVEL 4 ADDL 15 MIN: Performed by: ANESTHESIOLOGY

## 2021-08-10 PROCEDURE — 64491 INJ PARAVERT F JNT C/T 2 LEV: CPT | Performed by: ANESTHESIOLOGY

## 2021-08-10 PROCEDURE — 2500000003 HC RX 250 WO HCPCS: Performed by: ANESTHESIOLOGY

## 2021-08-10 PROCEDURE — 7100000011 HC PHASE II RECOVERY - ADDTL 15 MIN: Performed by: ANESTHESIOLOGY

## 2021-08-10 PROCEDURE — 3209999900 FLUORO FOR SURGICAL PROCEDURES

## 2021-08-10 RX ORDER — KETOROLAC TROMETHAMINE 10 MG/1
10 TABLET, FILM COATED ORAL EVERY 8 HOURS PRN
Qty: 20 TABLET | Refills: 0 | Status: SHIPPED | OUTPATIENT
Start: 2021-08-10 | End: 2021-09-11 | Stop reason: SDUPTHER

## 2021-08-10 RX ORDER — FENTANYL CITRATE 50 UG/ML
INJECTION, SOLUTION INTRAMUSCULAR; INTRAVENOUS PRN
Status: DISCONTINUED | OUTPATIENT
Start: 2021-08-10 | End: 2021-08-10 | Stop reason: ALTCHOICE

## 2021-08-10 RX ORDER — LIDOCAINE HYDROCHLORIDE 10 MG/ML
INJECTION, SOLUTION EPIDURAL; INFILTRATION; INTRACAUDAL; PERINEURAL PRN
Status: DISCONTINUED | OUTPATIENT
Start: 2021-08-10 | End: 2021-08-10 | Stop reason: ALTCHOICE

## 2021-08-10 RX ORDER — MIDAZOLAM HYDROCHLORIDE 1 MG/ML
INJECTION INTRAMUSCULAR; INTRAVENOUS PRN
Status: DISCONTINUED | OUTPATIENT
Start: 2021-08-10 | End: 2021-08-10 | Stop reason: ALTCHOICE

## 2021-08-10 RX ORDER — BUPIVACAINE HYDROCHLORIDE 5 MG/ML
INJECTION, SOLUTION PERINEURAL PRN
Status: DISCONTINUED | OUTPATIENT
Start: 2021-08-10 | End: 2021-08-10 | Stop reason: ALTCHOICE

## 2021-08-10 ASSESSMENT — PAIN SCALES - GENERAL: PAINLEVEL_OUTOF10: 3

## 2021-08-10 ASSESSMENT — PAIN - FUNCTIONAL ASSESSMENT: PAIN_FUNCTIONAL_ASSESSMENT: 0-10

## 2021-08-10 NOTE — PROGRESS NOTES
0937-Patient to Phase II via cart. Report received from [email or web address]. Patient drowsy but responsive. Vitals obtained and stable. Respirations even and unlabored on room air. Patient denies pain, nausea, numbness and tingling. Patient able to move all extremities. No drainage noted at injection sites. Patient instructed to stay in bed. Instructed on call light use. 0940-Patient provided with snack and drink. Denies needs at this time. 0950-IV removed with no complications. Patient getting dressed at bedside. 1003-Patient discharged in stable condition with responsible . All belongings given to patient. Patient ambulated to car with assistance from RN. Patient tolerated well.

## 2021-08-10 NOTE — PRE SEDATION
[address] [address]  Pre-Sedation/Analgesia History & Physical    Pt Name: Russell Jack  MRN: [id number]  YOB: 1980  Provider Performing Procedure: Alex Aguilar DO   Primary Care Physician: EVA Quezada CNP      MEDICAL HISTORY       has a past medical history of Anxiety, Asthma, COPD (chronic obstructive pulmonary disease) ([address] Depression, and Migraines. SURGICAL HISTORY   has a past surgical history that includes Tonsillectomy and adenoidectomy (1989); Ovary removal (2000); Cholecystectomy (2003); Tubal ligation (2005); Hysterectomy (2011); Dilation and curettage of uterus (2009); IGS Endo Sinus Sx (07/12/2017); and Tooth Extraction. ALLERGIES   Allergies as of 07/22/2021 - Fully Reviewed 07/22/2021   Allergen Reaction Noted    Botox [onabotulinumtoxina (cosmetic)]  02/20/2019    Other  02/20/2019    Topamax [topiramate]  02/20/2019    Acetaminophen Nausea And Vomiting 10/19/2017    Aspirin Nausea And Vomiting 03/09/2015    Ibuprofen Nausea And Vomiting 03/09/2015       MEDICATIONS   Prior to Admission medications    Medication Sig Start Date End Date Taking? Authorizing Provider   gabapentin (NEURONTIN) 800 MG tablet Take 1 tablet by mouth 4 times daily 7/23/21 1/19/22 Yes EVA Quezada CNP   clonazePAM (KLONOPIN) 1 MG tablet TAKE 1 TABLET BY MOUTH NIGHTLY AS NEEDED FOR ANXIETY FOR  UP  TO  30  DAYS 7/23/21 8/22/21 Yes EVA Quezada CNP   amitriptyline (ELAVIL) 50 MG tablet Take 1 tablet by mouth twice daily 7/23/21  Yes EVA Quezada CNP   ketorolac (TORADOL) 10 MG tablet Take 1 tablet by mouth every 8 hours as needed for Pain 7/1/21  Yes EVA Quezada CNP   medical marijuana Take by mouth as needed.    Yes Historical Provider, MD   albuterol sulfate HFA (VENTOLIN HFA) 108 (90 Base) MCG/ACT inhaler Inhale 1-2 puffs into the lungs every 6 hours as needed for Wheezing 5/4/21  Yes EVA Quezada CNP   divalproex (DEPAKOTE ER) 500 MG extended release tablet TAKE 1 TABLET BY MOUTH ONCE BID 1/27/21  Yes Marly Power, APRN - CNP   vilazodone HCl (VILAZODONE HCL) 10 MG TABS Take 1 tablet by mouth once daily 1/27/21  Yes Marly Power, APRN - CNP   QUEtiapine (SEROQUEL) 200 MG tablet Take 1 tablet by mouth nightly 1/27/21  Yes Marly Power, APRN - CNP   saline nasal gel (AYR) GEL by Nasal route daily as needed for Congestion 1/27/21  Yes Marly Power, APRN - CNP   albuterol (PROVENTIL) (2.5 MG/3ML) 0.083% nebulizer solution Take 3 mLs by nebulization every 6 hours as needed for Wheezing 12/7/20  Yes Marly Power, APRN - CNP   SYMBICORT 80-4.5 MCG/ACT AERO INHALE 2 PUFFS BY MOUTH TWICE DAILY, RINSE MOUTH AFTER USE 10/15/20  Yes Marly Power, APRN - CNP   oxybutynin (DITROPAN-XL) 10 MG extended release tablet Take 1 tablet by mouth daily 7/9/20  Yes Marly Power, APRN - CNP   Multiple Vitamins-Calcium (ONE-A-DAY WOMENS PO) Take by mouth daily    Yes Historical Provider, MD   Respiratory Therapy Supplies (NEBULIZER COMPRESSOR) KIT 1 kit by Does not apply route once for 1 dose 9/5/19 9/5/19  Marly Power APRN - CNP     PHYSICAL:   Vitals:    08/10/21 0937   BP: 107/64   Pulse: 74   Resp: 16   Temp: 97.4 °F (36.3 °C)   SpO2: 95%     PLANNED PROCEDURE   See procedure note  SEDATION  Planned agent: Versed and Fentanyl  ASA Classification: 1  Class 1: A normal healthy patient  Class 2: Pt with mild to moderate systemic disease  Class 3: Severe systemic disease or disturbance  Class 4: Severe systemic disorders that are already life threatening. Class 5: Moribund pt with little chances of survival, for more than 24 hours. Mallampati I Airway Classification: 1    1. Pre-procedure diagnostic studies complete and results available. 2. Previous sedation/anesthesia experiences assessed. 3. The patient is an appropriate candidate to undergo the planned procedure sedation and anesthesia.  (Refer to nursing sedation/analgesia documentation record)  4. Formulation and discussion of sedation/procedure plan, risks, and expectations with patient and/or responsible adult completed. 5. Patient examined immediately prior to the procedure.  (Refer to nursing sedation/analgesia documentation record)    Angie Barcenas DO  Electronically signed 8/10/2021 at 10:27 AM

## 2021-08-10 NOTE — POST SEDATION
[address] [address]  Sedation/Analgesia Post Sedation Record    Pt Name: Sandra Leigh  MRN: [id number]  YOB: 1980  Procedure Performed By: Domingo Beck DO  Primary Care Physician: EVA Sheffield CNP    POST-PROCEDURE    Physicians/Assistants: Domingo Beck DO  Procedure Performed: See Procedure Note   Sedation/Anesthesia: Versed and Fentanyl (See procedure note for amount and duration)  Estimated Blood Loss:     0  ml  Specimens Removed: None        Complications: None           Pablo De La Rosa DO  Electronically signed 8/10/2021 at 10:27 AM

## 2021-08-10 NOTE — PROCEDURES
Pre-operative Diagnosis: Right cervical neck pain     Post-operative Diagnosis: Right cervical neck pain     Procedure: Cervical medial branch blocks targeting TON (C2/C3), C3/C4, and C4/C5     Procedure Description:  After having obtained a signed informed consent, the patient was taken to the fluoroscopy suite and placed in the prone position. The neck was prepped with chloraprep and draped in a sterile fashion. A total of 0.5 cc of  1 % lidocaine was used to anesthetize the skin and underlying tissues. Under fluoroscopic guidance, four 22G 3 ½ inch spinal needle were advanced on to the mid facet pilar of C2/C3 joint line (TON), C3, C4, and C5 on the right. There were no paresthesias, heme, or CSF aspiration. Needle placement was confirmed using the AP and lateral views. Then, 0.5 cc 0.5% bupivacaine was injected. The needle was removed without any complication. The patient tolerated the procedure well and was transported to the recovery room where he was observed for 15 minutes to then be discharged in ambulatory fashion.       Procedural Complications: None  Estimated Blood Loss: 0 mL      IV sedation was used during the procedure:  - Moderate intravenous conscious sedation was supervised by Dr. Silvestre Melo  - The patient was independently monitored by a Registered Nurse assigned to the procedure room  - Monitoring included automated blood pressure, continuous EKG, and continuous pulse oximetry  - The detailed conscious record is permanently stored in the [address] [id number]  - The following is the conscious sedation record:  Start Time: 09:24  End Time : 09:39  Duration: 15 minutes   Medications Administered: 2 mg Versed, 100 mcg Fentanyl      Pablo Ge DO  Interventional Pain Management/PM&R   [facility]

## 2021-08-13 ENCOUNTER — OFFICE VISIT (OUTPATIENT)
Dept: FAMILY MEDICINE CLINIC | Age: 41
End: 2021-08-13
Payer: MEDICARE

## 2021-08-13 VITALS
RESPIRATION RATE: 16 BRPM | BODY MASS INDEX: 30.33 KG/M2 | DIASTOLIC BLOOD PRESSURE: 68 MMHG | HEART RATE: 79 BPM | HEIGHT: 60 IN | WEIGHT: 154.5 LBS | SYSTOLIC BLOOD PRESSURE: 110 MMHG

## 2021-08-13 DIAGNOSIS — Z13.1 SCREENING FOR DIABETES MELLITUS: ICD-10-CM

## 2021-08-13 DIAGNOSIS — R20.0 NUMBNESS AND TINGLING IN BOTH HANDS: ICD-10-CM

## 2021-08-13 DIAGNOSIS — Z13.220 SCREENING FOR HYPERLIPIDEMIA: ICD-10-CM

## 2021-08-13 DIAGNOSIS — F43.9 TRAUMA AND STRESSOR-RELATED DISORDER: ICD-10-CM

## 2021-08-13 DIAGNOSIS — R20.2 NUMBNESS AND TINGLING IN BOTH HANDS: ICD-10-CM

## 2021-08-13 DIAGNOSIS — F43.21 DYSFUNCTIONAL GRIEVING: ICD-10-CM

## 2021-08-13 DIAGNOSIS — M79.18 CERVICAL MYOFASCIAL PAIN SYNDROME: ICD-10-CM

## 2021-08-13 DIAGNOSIS — F33.1 MODERATE EPISODE OF RECURRENT MAJOR DEPRESSIVE DISORDER (HCC): Primary | ICD-10-CM

## 2021-08-13 PROCEDURE — G8427 DOCREV CUR MEDS BY ELIG CLIN: HCPCS | Performed by: NURSE PRACTITIONER

## 2021-08-13 PROCEDURE — 99214 OFFICE O/P EST MOD 30 MIN: CPT | Performed by: NURSE PRACTITIONER

## 2021-08-13 PROCEDURE — 4004F PT TOBACCO SCREEN RCVD TLK: CPT | Performed by: NURSE PRACTITIONER

## 2021-08-13 PROCEDURE — G8417 CALC BMI ABV UP PARAM F/U: HCPCS | Performed by: NURSE PRACTITIONER

## 2021-08-13 RX ORDER — VILAZODONE HYDROCHLORIDE 20 MG/1
TABLET ORAL
Qty: 30 TABLET | Refills: 0 | Status: SHIPPED | OUTPATIENT
Start: 2021-08-13 | End: 2021-10-04 | Stop reason: SDUPTHER

## 2021-08-13 RX ORDER — AMITRIPTYLINE HYDROCHLORIDE 150 MG/1
TABLET, FILM COATED ORAL
Qty: 30 TABLET | Refills: 5 | Status: SHIPPED | OUTPATIENT
Start: 2021-08-13 | End: 2021-10-04 | Stop reason: SDUPTHER

## 2021-08-13 SDOH — ECONOMIC STABILITY: FOOD INSECURITY: WITHIN THE PAST 12 MONTHS, THE FOOD YOU BOUGHT JUST DIDN'T LAST AND YOU DIDN'T HAVE MONEY TO GET MORE.: NEVER TRUE

## 2021-08-13 SDOH — ECONOMIC STABILITY: FOOD INSECURITY: WITHIN THE PAST 12 MONTHS, YOU WORRIED THAT YOUR FOOD WOULD RUN OUT BEFORE YOU GOT MONEY TO BUY MORE.: NEVER TRUE

## 2021-08-13 ASSESSMENT — ENCOUNTER SYMPTOMS
BACK PAIN: 1
ABDOMINAL PAIN: 0
COUGH: 0
SHORTNESS OF BREATH: 0
NAUSEA: 0

## 2021-08-13 ASSESSMENT — SOCIAL DETERMINANTS OF HEALTH (SDOH): HOW HARD IS IT FOR YOU TO PAY FOR THE VERY BASICS LIKE FOOD, HOUSING, MEDICAL CARE, AND HEATING?: NOT HARD AT ALL
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
## 2021-08-17 RX ORDER — BACLOFEN 10 MG/1
10 TABLET ORAL 3 TIMES DAILY
Qty: 90 TABLET | Refills: 2 | Status: SHIPPED | OUTPATIENT
Start: 2021-08-17 | End: 2022-01-06

## 2021-09-02 ENCOUNTER — OFFICE VISIT (OUTPATIENT)
Dept: PHYSICAL MEDICINE AND REHAB | Age: 41
End: 2021-09-02
Payer: MEDICARE

## 2021-09-02 VITALS
BODY MASS INDEX: 30.23 KG/M2 | HEIGHT: 60 IN | WEIGHT: 154 LBS | SYSTOLIC BLOOD PRESSURE: 106 MMHG | DIASTOLIC BLOOD PRESSURE: 80 MMHG

## 2021-09-02 DIAGNOSIS — G89.29 OTHER CHRONIC PAIN: ICD-10-CM

## 2021-09-02 DIAGNOSIS — G44.86 CERVICOGENIC HEADACHE: ICD-10-CM

## 2021-09-02 DIAGNOSIS — M79.18 MYOFASCIAL PAIN: ICD-10-CM

## 2021-09-02 DIAGNOSIS — M54.2 PAIN OF CERVICAL FACET JOINT: Primary | ICD-10-CM

## 2021-09-02 PROCEDURE — G8417 CALC BMI ABV UP PARAM F/U: HCPCS | Performed by: ANESTHESIOLOGY

## 2021-09-02 PROCEDURE — 4004F PT TOBACCO SCREEN RCVD TLK: CPT | Performed by: ANESTHESIOLOGY

## 2021-09-02 PROCEDURE — G8427 DOCREV CUR MEDS BY ELIG CLIN: HCPCS | Performed by: ANESTHESIOLOGY

## 2021-09-02 PROCEDURE — 99214 OFFICE O/P EST MOD 30 MIN: CPT | Performed by: ANESTHESIOLOGY

## 2021-09-02 RX ORDER — TRAMADOL HYDROCHLORIDE 50 MG/1
50 TABLET ORAL 2 TIMES DAILY PRN
Qty: 60 TABLET | Refills: 0 | Status: SHIPPED | OUTPATIENT
Start: 2021-09-02 | End: 2021-10-04 | Stop reason: SDUPTHER
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
## 2021-09-10 ENCOUNTER — VIRTUAL VISIT (OUTPATIENT)
Dept: FAMILY MEDICINE CLINIC | Age: 41
End: 2021-09-10
Payer: MEDICARE

## 2021-09-10 DIAGNOSIS — Z13.220 SCREENING FOR HYPERLIPIDEMIA: ICD-10-CM

## 2021-09-10 DIAGNOSIS — R20.0 NUMBNESS AND TINGLING IN BOTH HANDS: ICD-10-CM

## 2021-09-10 DIAGNOSIS — F33.1 MODERATE EPISODE OF RECURRENT MAJOR DEPRESSIVE DISORDER (HCC): Primary | ICD-10-CM

## 2021-09-10 DIAGNOSIS — R20.2 NUMBNESS AND TINGLING IN BOTH HANDS: ICD-10-CM

## 2021-09-10 DIAGNOSIS — F43.21 DYSFUNCTIONAL GRIEVING: ICD-10-CM

## 2021-09-10 DIAGNOSIS — F43.9 TRAUMA AND STRESSOR-RELATED DISORDER: ICD-10-CM

## 2021-09-10 DIAGNOSIS — M79.18 CERVICAL MYOFASCIAL PAIN SYNDROME: ICD-10-CM

## 2021-09-10 PROCEDURE — 99213 OFFICE O/P EST LOW 20 MIN: CPT | Performed by: NURSE PRACTITIONER

## 2021-09-10 PROCEDURE — G8428 CUR MEDS NOT DOCUMENT: HCPCS | Performed by: NURSE PRACTITIONER

## 2021-09-10 RX ORDER — OLANZAPINE 5 MG/1
5 TABLET ORAL NIGHTLY
Qty: 30 TABLET | Refills: 0 | Status: SHIPPED | OUTPATIENT
Start: 2021-09-10 | End: 2021-10-04 | Stop reason: SDUPTHER

## 2021-09-10 ASSESSMENT — ENCOUNTER SYMPTOMS
NAUSEA: 0
COUGH: 0
SHORTNESS OF BREATH: 0
ABDOMINAL PAIN: 0
BACK PAIN: 1

## 2021-09-10 NOTE — PROGRESS NOTES
Subjective:      Patient ID: Laxmi Anderson is a 37 y.o. female    HPI: 1 month Follow Up    TELEHEALTH EVALUATION -- Audio/Visual (During QXUXN-93 public health emergency)    Patient identification was verified at the start of the visit: Yes    Services were provided through a video synchronous discussion virtually to substitute for in-person clinic visit. Patient and provider were located at their individual homes. Patient has requested an audio/video evaluation for the following concern(s):    Chief Complaint   Patient presents with    1 Month Follow-Up       Continues to grieve. Sudden passing of her  Spring 2021. Was following with Robert Horan through [facility]. A  Constant reminders of her husbands death that is making it difficult. Hard to grieve as she needs to continue to work and care for the family. No time for herself. Using Klonopin at night to help sleep and shut off brain. Does not take during the day. Not doing well mentally. On Viibryd 10 mg, Depakote 500 mg BID and PRN Seroquel 200 mg on nights she does not work due to hangover effect of drowsiness. Medications were adjusted last time. Increase Elavil 150 mg to help with neck pain was beneficial.  Sees PAIN and is scheduled for additional injections    Viibryd was increased and Depakote/Seroquel were stopped due to lack of benefit or need. Continued with Klonopin HS. 11 months since passing of her . Stress and anxiety are high. No sleeping well. Patient Active Problem List   Diagnosis    Smoking addiction    [facility]) [facility]    ANTHONY (generalized anxiety disorder)    Chronic pansinusitis    Bilateral occipital neuralgia    Cervicogenic headache    Chronic migraine without aura without status migrainosus, not intractable    Seizure-like activity ([facility]       Review of Systems   Constitutional: Negative for chills and fever. HENT: Negative.     Respiratory: Negative for cough and shortness of breath. Cardiovascular: Negative for chest pain. Gastrointestinal: Negative for abdominal pain and nausea. Musculoskeletal: Positive for arthralgias, back pain, neck pain and neck stiffness. Skin: Negative for rash. Neurological: Positive for weakness and numbness. Negative for dizziness, light-headedness and headaches. Psychiatric/Behavioral: Positive for sleep disturbance. The patient is nervous/anxious and is hyperactive. Objective:   Physical Exam  Constitutional:       General: She is not in acute distress. Appearance: She is not ill-appearing. Pulmonary:      Effort: Pulmonary effort is normal. No respiratory distress. Neurological:      Mental Status: She is alert and oriented to person, place, and time. Psychiatric:         Mood and Affect: Mood normal.         Behavior: Behavior normal.         Assessment:       Diagnosis Orders   1. Moderate episode of recurrent major depressive disorder (HCC)  OLANZapine (ZYPREXA) 5 MG tablet   2. Trauma and stressor-related disorder  OLANZapine (ZYPREXA) 5 MG tablet   3. Cervical myofascial pain syndrome     4. Screening for hyperlipidemia     5. Dysfunctional grieving     6. Numbness and tingling in both hands     7. Chronic migraine         Plan:     Add on Zyprex 5 mg HS  Continue Viibryd at 20 mg  Followup with PAIN  Reminder to get blood work  Update in 2-3 weeks       Due to this being a TeleHealth encounter (During [address] emergency), evaluation of the following organ systems was limited: Vitals/Constitutional/EENT/Resp/CV/GI//MS/Neuro/Skin/Heme-Lymph-Imm.   Pursuant to the emergency declaration under the [facility] [address] authority and the [employer] Act, this Virtual Visit was conducted with patient's (and/or legal guardian's) consent, to reduce the patient's risk of exposure to COVID-19 and provide necessary medical care. The patient (and/or legal guardian) has also been advised to contact this office for worsening conditions or problems, and seek emergency medical treatment and/or call 911 if deemed necessary. --EVA Harry CNP on 9/10/2021 at 10:08 AM    An electronic signature was used to authenticate this note.      9/10/2021

## 2021-09-14 ENCOUNTER — HOSPITAL ENCOUNTER (EMERGENCY)
Age: 41
Discharge: HOME | End: 2021-09-14
Payer: MEDICARE

## 2021-09-14 VITALS
HEART RATE: 74 BPM | SYSTOLIC BLOOD PRESSURE: 115 MMHG | TEMPERATURE: 96.8 F | OXYGEN SATURATION: 98 % | RESPIRATION RATE: 16 BRPM | DIASTOLIC BLOOD PRESSURE: 84 MMHG | HEIGHT: 60 IN | WEIGHT: 152.6 LBS | BODY MASS INDEX: 29.96 KG/M2

## 2021-09-14 DIAGNOSIS — M79.602 PARESTHESIA AND PAIN OF BOTH UPPER EXTREMITIES: Primary | ICD-10-CM

## 2021-09-14 DIAGNOSIS — R20.2 PARESTHESIA AND PAIN OF BOTH UPPER EXTREMITIES: Primary | ICD-10-CM

## 2021-09-14 DIAGNOSIS — R11.0 NAUSEA: ICD-10-CM

## 2021-09-14 DIAGNOSIS — R42 DIZZINESS: ICD-10-CM

## 2021-09-14 DIAGNOSIS — M79.601 PARESTHESIA AND PAIN OF BOTH UPPER EXTREMITIES: Primary | ICD-10-CM

## 2021-09-14 PROCEDURE — 99213 OFFICE O/P EST LOW 20 MIN: CPT | Performed by: EMERGENCY MEDICINE

## 2021-09-14 PROCEDURE — 99213 OFFICE O/P EST LOW 20 MIN: CPT

## 2021-09-14 RX ORDER — ONDANSETRON 4 MG/1
4 TABLET, ORALLY DISINTEGRATING ORAL EVERY 8 HOURS PRN
Qty: 9 TABLET | Refills: 0 | Status: SHIPPED | OUTPATIENT
Start: 2021-09-14 | End: 2022-03-15 | Stop reason: ALTCHOICE

## 2021-09-14 ASSESSMENT — ENCOUNTER SYMPTOMS
COUGH: 0
SHORTNESS OF BREATH: 0
NAUSEA: 1
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
## 2021-09-14 NOTE — ED TRIAGE NOTES
Patient ambulated to room and states she was sent to urgent care by Dr Emy Almonte at pain management. States she was given baclofen, amitriptyline and gabapentin and has been working with him to management medications. States she has dizziness, tingling in her whole body and can't hold anything in hands without dropping it.   States she will need work note for today's visit
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
## 2021-09-21 ENCOUNTER — APPOINTMENT (OUTPATIENT)
Dept: GENERAL RADIOLOGY | Age: 41
End: 2021-09-21
Attending: ANESTHESIOLOGY
Payer: MEDICARE

## 2021-09-21 ENCOUNTER — HOSPITAL ENCOUNTER (OUTPATIENT)
Age: 41
Setting detail: OUTPATIENT SURGERY
Discharge: HOME | End: 2021-09-21
Attending: ANESTHESIOLOGY | Admitting: ANESTHESIOLOGY
Payer: MEDICARE

## 2021-09-21 VITALS
RESPIRATION RATE: 14 BRPM | BODY MASS INDEX: 29.68 KG/M2 | HEIGHT: 60 IN | WEIGHT: 151.2 LBS | DIASTOLIC BLOOD PRESSURE: 74 MMHG | OXYGEN SATURATION: 94 % | SYSTOLIC BLOOD PRESSURE: 111 MMHG | HEART RATE: 76 BPM | TEMPERATURE: 97.4 F

## 2021-09-21 PROCEDURE — 3600000056 HC PAIN LEVEL 4 BASE: Performed by: ANESTHESIOLOGY

## 2021-09-21 PROCEDURE — 2500000003 HC RX 250 WO HCPCS: Performed by: ANESTHESIOLOGY

## 2021-09-21 PROCEDURE — 99152 MOD SED SAME PHYS/QHP 5/>YRS: CPT | Performed by: ANESTHESIOLOGY

## 2021-09-21 PROCEDURE — 3209999900 FLUORO FOR SURGICAL PROCEDURES

## 2021-09-21 PROCEDURE — 64491 INJ PARAVERT F JNT C/T 2 LEV: CPT | Performed by: ANESTHESIOLOGY

## 2021-09-21 PROCEDURE — 64490 INJ PARAVERT F JNT C/T 1 LEV: CPT | Performed by: ANESTHESIOLOGY

## 2021-09-21 PROCEDURE — 7100000011 HC PHASE II RECOVERY - ADDTL 15 MIN: Performed by: ANESTHESIOLOGY

## 2021-09-21 PROCEDURE — 6360000002 HC RX W HCPCS: Performed by: ANESTHESIOLOGY

## 2021-09-21 PROCEDURE — 7100000010 HC PHASE II RECOVERY - FIRST 15 MIN: Performed by: ANESTHESIOLOGY

## 2021-09-21 PROCEDURE — 2709999900 HC NON-CHARGEABLE SUPPLY: Performed by: ANESTHESIOLOGY

## 2021-09-21 PROCEDURE — 64492 INJ PARAVERT F JNT C/T 3 LEV: CPT | Performed by: ANESTHESIOLOGY

## 2021-09-21 RX ORDER — BUPIVACAINE HYDROCHLORIDE 5 MG/ML
INJECTION, SOLUTION PERINEURAL PRN
Status: DISCONTINUED | OUTPATIENT
Start: 2021-09-21 | End: 2021-09-21 | Stop reason: ALTCHOICE

## 2021-09-21 RX ORDER — MIDAZOLAM HYDROCHLORIDE 1 MG/ML
INJECTION INTRAMUSCULAR; INTRAVENOUS PRN
Status: DISCONTINUED | OUTPATIENT
Start: 2021-09-21 | End: 2021-09-21 | Stop reason: ALTCHOICE

## 2021-09-21 RX ORDER — LIDOCAINE HYDROCHLORIDE 10 MG/ML
INJECTION, SOLUTION EPIDURAL; INFILTRATION; INTRACAUDAL; PERINEURAL PRN
Status: DISCONTINUED | OUTPATIENT
Start: 2021-09-21 | End: 2021-09-21 | Stop reason: ALTCHOICE

## 2021-09-21 RX ORDER — FENTANYL CITRATE 50 UG/ML
INJECTION, SOLUTION INTRAMUSCULAR; INTRAVENOUS PRN
Status: DISCONTINUED | OUTPATIENT
Start: 2021-09-21 | End: 2021-09-21 | Stop reason: ALTCHOICE

## 2021-09-21 ASSESSMENT — PAIN DESCRIPTION - DESCRIPTORS: DESCRIPTORS: SHARP;SHOOTING

## 2021-09-21 ASSESSMENT — PAIN - FUNCTIONAL ASSESSMENT: PAIN_FUNCTIONAL_ASSESSMENT: 0-10

## 2021-09-21 ASSESSMENT — PAIN SCALES - GENERAL: PAINLEVEL_OUTOF10: 0

## 2021-09-21 NOTE — POST SEDATION
[facility]  Sedation/Analgesia Post Sedation Record    Pt Name: Russell Jack  MRN: [id number]  YOB: 1980  Procedure Performed By: Alex Aguilar DO  Primary Care Physician: EVA Quezada CNP    POST-PROCEDURE    Physicians/Assistants: Alex Aguilar DO  Procedure Performed: See Procedure Note   Sedation/Anesthesia: Versed and Fentanyl (See procedure note for amount and duration)  Estimated Blood Loss:     0  ml  Specimens Removed: None        Complications: None           Derik Tammi Scheuermann, DO  Electronically signed 9/21/2021 at 12:37 PM

## 2021-09-21 NOTE — PROCEDURES
Pre-operative Diagnosis: Right cervical neck pain     Post-operative Diagnosis: Right cervical neck pain     Procedure: Cervical medial branch blocks targeting TON (C2/C3), C3/C4, and C4/C5     Procedure Description:  After having obtained a signed informed consent, the patient was taken to the fluoroscopy suite and placed in the prone position.  The neck was prepped with chloraprep and draped in a sterile fashion. A total of 0.5 cc of  1 % lidocaine was used to anesthetize the skin and underlying tissues.  Under fluoroscopic guidance, four 22G 3 ½ inch spinal needle were advanced on to the mid facet pilar of C2/C3 joint line (TON), C3, C4, and C5 on the right. There were no paresthesias, heme, or CSF aspiration.  Needle placement was confirmed using the AP and lateral views. Then, 0.5 cc 0.5% bupivacaine was injected.  The needle was removed without any complication.  The patient tolerated the procedure well and was transported to the recovery room where he was observed for 15 minutes to then be discharged in ambulatory fashion.      Procedural Complications: None  Estimated Blood Loss: 0 mL        IV sedation was used during the procedure:  - Moderate intravenous conscious sedation was supervised by Dr. Danna Pablo  - The patient was independently monitored by a Registered Nurse assigned to the procedure room  - Monitoring included automated blood pressure, continuous EKG, and continuous pulse oximetry  - The detailed conscious record is permanently stored in the [place] [id number]  - The following is the conscious sedation record:  Start Time: 10:57  End Time : 11:12  Duration: 15 minutes   Medications Administered: 1 mg Versed, 100 mcg Fentanyl        Pablo Pablo DO  Interventional Pain Management/PM&R   [facility] and [address] [place] [address]

## 2021-09-21 NOTE — PRE SEDATION
[address] [id number]  Pre-Sedation/Analgesia History & Physical    Pt Name: Sriram Caballero  MRN: [id number]  YOB: 1980  Provider Performing Procedure: Leslie Patel DO   Primary Care Physician: EVA Tapia CNP      MEDICAL HISTORY       has a past medical history of Anxiety, Asthma, COPD (chronic obstructive pulmonary disease) ([address] Depression, and Migraines. SURGICAL HISTORY   has a past surgical history that includes Tonsillectomy and adenoidectomy (1989); Ovary removal (2000); Cholecystectomy (2003); Tubal ligation (2005); Hysterectomy (2011); Dilation and curettage of uterus (2009); IGS Endo Sinus Sx (07/12/2017); Tooth Extraction; and Facet joint injection (Right, 8/10/2021). ALLERGIES   Allergies as of 09/02/2021 - Fully Reviewed 09/02/2021   Allergen Reaction Noted    Botox [onabotulinumtoxina (cosmetic)]  02/20/2019    Other  02/20/2019    Topamax [topiramate]  02/20/2019    Acetaminophen Nausea And Vomiting 10/19/2017    Aspirin Nausea And Vomiting 03/09/2015    Ibuprofen Nausea And Vomiting 03/09/2015       MEDICATIONS   Prior to Admission medications    Medication Sig Start Date End Date Taking? Authorizing Provider   ondansetron (ZOFRAN ODT) 4 MG disintegrating tablet Take 1 tablet by mouth every 8 hours as needed for Nausea or Vomiting 9/14/21  Yes EVA Rome CNP   clonazePAM (KLONOPIN) 1 MG tablet Take 1 tablet by mouth nightly as needed for Anxiety for up to 30 days.  9/13/21 10/13/21 Yes EVA Tapia CNP   ketorolac (TORADOL) 10 MG tablet Take 1 tablet by mouth every 8 hours as needed for Pain 9/13/21  Yes EVA Tapia CNP   OLANZapine (ZYPREXA) 5 MG tablet Take 1 tablet by mouth nightly 9/10/21  Yes EVA Tapia CNP   baclofen (LIORESAL) 10 MG tablet Take 1 tablet by mouth 3 times daily 8/17/21  Yes Pablo Connors DO   vilazodone HCl (VILAZODONE HCL) 20 MG TABS Take 1 tablet by mouth once daily 8/13/21  Yes candidate to undergo the planned procedure sedation and anesthesia. (Refer to nursing sedation/analgesia documentation record)  4. Formulation and discussion of sedation/procedure plan, risks, and expectations with patient and/or responsible adult completed. 5. Patient examined immediately prior to the procedure.  (Refer to nursing sedation/analgesia documentation record)    Jeny Lyle DO  Electronically signed 9/21/2021 at 12:37 PM

## 2021-09-21 NOTE — PROGRESS NOTES
1107-  Patient arrived to phase II via cart. Spontaneous respiraitons even and unlabored. Placed on monitor--VSS. Report received from Surgical RN. 1108-  Assessment completed. Patient is alert and oriented x4. IV capped off-- no complications. Patient denies pain--will monitor. Injection sites clean and dry. 1110-  Snack and drink given to patient. 1115-  All belongings in room. 1120-  IV removed-- no complications. Bandage applied. 1125-  Patient dressing. 1131-  Patient discharged in stable condition with all belongings. This RN walked patient to car.

## 2021-10-04 ENCOUNTER — PATIENT MESSAGE (OUTPATIENT)
Dept: FAMILY MEDICINE CLINIC | Age: 41
End: 2021-10-04

## 2021-10-04 DIAGNOSIS — F33.1 MODERATE EPISODE OF RECURRENT MAJOR DEPRESSIVE DISORDER (HCC): ICD-10-CM

## 2021-10-04 DIAGNOSIS — F43.9 TRAUMA AND STRESSOR-RELATED DISORDER: ICD-10-CM

## 2021-10-04 DIAGNOSIS — R20.2 NUMBNESS AND TINGLING IN BOTH HANDS: ICD-10-CM

## 2021-10-04 DIAGNOSIS — R51.9 CHRONIC NONINTRACTABLE HEADACHE, UNSPECIFIED HEADACHE TYPE: ICD-10-CM

## 2021-10-04 DIAGNOSIS — R20.0 NUMBNESS AND TINGLING IN BOTH HANDS: ICD-10-CM

## 2021-10-04 DIAGNOSIS — R09.81 NASAL CONGESTION: ICD-10-CM

## 2021-10-04 DIAGNOSIS — G89.29 OTHER CHRONIC PAIN: ICD-10-CM

## 2021-10-04 DIAGNOSIS — G89.29 CHRONIC NONINTRACTABLE HEADACHE, UNSPECIFIED HEADACHE TYPE: ICD-10-CM

## 2021-10-04 RX ORDER — AMOXICILLIN 875 MG/1
875 TABLET, COATED ORAL 2 TIMES DAILY
Qty: 20 TABLET | Refills: 0 | Status: SHIPPED | OUTPATIENT
Start: 2021-10-04 | End: 2022-01-26 | Stop reason: SDUPTHER

## 2021-10-04 RX ORDER — KETOROLAC TROMETHAMINE 10 MG/1
10 TABLET, FILM COATED ORAL EVERY 8 HOURS PRN
Qty: 20 TABLET | Refills: 0 | Status: SHIPPED | OUTPATIENT
Start: 2021-10-04 | End: 2021-11-17 | Stop reason: SDUPTHER

## 2021-10-04 RX ORDER — TRAMADOL HYDROCHLORIDE 50 MG/1
50 TABLET ORAL 2 TIMES DAILY PRN
Qty: 60 TABLET | Refills: 0 | Status: SHIPPED | OUTPATIENT
Start: 2021-10-04 | End: 2021-11-03

## 2021-10-04 NOTE — TELEPHONE ENCOUNTER
OARRS reviewed. UDS:no screen. Canceled follow up. Last seen: 9/2/2021. Follow-up: No future appointments.

## 2021-10-04 NOTE — TELEPHONE ENCOUNTER
From: Ritu Daniels  To: EVA Gutiérrez - CNP  Sent: 10/4/2021 2:05 PM EDT  Subject: Prescription Question    I'm back in [address]. I'm using their pharmacy again. Also in need of amoxicillin. I've got a major tooth infection in my bottom teeth.  I'm waiting on the dentist to get back with me so I can get in

## 2021-10-04 NOTE — TELEPHONE ENCOUNTER
Patients phone number she has listed is not a working number, I sent patient a [employer] message regarding follow up appointment.

## 2021-10-05 RX ORDER — SODIUM CHLORIDE/ALOE VERA
GEL (GRAM) NASAL DAILY PRN
Qty: 3 EACH | Refills: 3 | Status: SHIPPED | OUTPATIENT
Start: 2021-10-05 | End: 2021-11-17 | Stop reason: SDUPTHER

## 2021-10-05 RX ORDER — AMITRIPTYLINE HYDROCHLORIDE 150 MG/1
TABLET, FILM COATED ORAL
Qty: 30 TABLET | Refills: 5 | Status: SHIPPED | OUTPATIENT
Start: 2021-10-05 | End: 2022-04-21

## 2021-10-05 RX ORDER — VILAZODONE HYDROCHLORIDE 20 MG/1
TABLET ORAL
Qty: 30 TABLET | Refills: 5 | Status: SHIPPED | OUTPATIENT
Start: 2021-10-05 | End: 2022-04-21 | Stop reason: SDUPTHER

## 2021-10-05 RX ORDER — GABAPENTIN 800 MG/1
800 TABLET ORAL 4 TIMES DAILY
Qty: 360 TABLET | Refills: 1 | Status: SHIPPED | OUTPATIENT
Start: 2021-10-05 | End: 2022-01-07 | Stop reason: SDUPTHER

## 2021-10-05 RX ORDER — CLONAZEPAM 1 MG/1
1 TABLET ORAL NIGHTLY PRN
Qty: 30 TABLET | Refills: 2 | OUTPATIENT
Start: 2021-10-05 | End: 2021-11-04

## 2021-10-05 RX ORDER — OLANZAPINE 5 MG/1
5 TABLET ORAL NIGHTLY
Qty: 30 TABLET | Refills: 5 | Status: SHIPPED | OUTPATIENT
Start: 2021-10-05 | End: 2022-04-21

## 2021-10-13 ENCOUNTER — PATIENT MESSAGE (OUTPATIENT)
Dept: FAMILY MEDICINE CLINIC | Age: 41
End: 2021-10-13

## 2021-10-13 DIAGNOSIS — F17.200 SMOKING: Primary | ICD-10-CM

## 2021-10-14 NOTE — TELEPHONE ENCOUNTER
From: Anat Coyle  To: EVA Power CNP  Sent: 10/13/2021 5:15 PM EDT  Subject: Prescription Question    Is there anything I can use to quit smoking?  I'm ready to try to quit

## 2021-10-15 ENCOUNTER — OFFICE VISIT (OUTPATIENT)
Dept: PHYSICAL MEDICINE AND REHAB | Age: 41
End: 2021-10-15
Payer: MEDICARE

## 2021-10-15 VITALS
HEIGHT: 60 IN | BODY MASS INDEX: 29.64 KG/M2 | WEIGHT: 151 LBS | SYSTOLIC BLOOD PRESSURE: 102 MMHG | DIASTOLIC BLOOD PRESSURE: 60 MMHG

## 2021-10-15 DIAGNOSIS — G89.29 OTHER CHRONIC PAIN: Primary | ICD-10-CM

## 2021-10-15 DIAGNOSIS — M54.2 PAIN OF CERVICAL FACET JOINT: ICD-10-CM

## 2021-10-15 PROCEDURE — 4004F PT TOBACCO SCREEN RCVD TLK: CPT | Performed by: ANESTHESIOLOGY

## 2021-10-15 PROCEDURE — G8427 DOCREV CUR MEDS BY ELIG CLIN: HCPCS | Performed by: ANESTHESIOLOGY

## 2021-10-15 PROCEDURE — 99214 OFFICE O/P EST MOD 30 MIN: CPT | Performed by: ANESTHESIOLOGY

## 2021-10-15 PROCEDURE — G8417 CALC BMI ABV UP PARAM F/U: HCPCS | Performed by: ANESTHESIOLOGY

## 2021-10-15 PROCEDURE — G8484 FLU IMMUNIZE NO ADMIN: HCPCS | Performed by: ANESTHESIOLOGY

## 2021-10-15 RX ORDER — TRAMADOL HYDROCHLORIDE 50 MG/1
50 TABLET ORAL 2 TIMES DAILY PRN
Qty: 60 TABLET | Refills: 0 | Status: SHIPPED | OUTPATIENT
Start: 2021-10-15 | End: 2021-11-14
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
## 2021-10-15 NOTE — PATIENT INSTRUCTIONS
The following treatment recommendations and plan were discussed in detail with Roxanna Zapata.     Imaging:   I have reviewed patients imaging of MRI C-spine and results were discussed with patient today.      Analgesics: For breakthrough pain while we complete her right-sided cervical radiofrequency ablation, start the patient on low-dose tramadol 50 mg that she can take up to twice a day as needed for severe pain (pain greater than 7/10). This medication should not have much interaction with her other mood stabilizer medications and I am unable to start her on Tylenol 3 with codeine due to her side effects and allergies to Tylenol. We will not use a stronger opioid to help control her pain and this medication is used until we complete the ablation. This medication will not be continued after her ablation therapy. Patient is advised take this medication as prescribed as taking more than prescribed and the development of tolerance, physical dependence, addiction.     OARRS reviewed and is appropriate. Pain contract signed.     Patient has allergies to acetaminophen and multiple NSAIDs.     Adjuvants: In light of the presence of a neuropathic component of pain, patient is advised to continue gabapentin.     Interventions: In presence of cervical neck and with physical exam consistent for facetal pain and significant response to diagnostic and confirmatory medial branch blocks, thermal radiofrequency ablation at RIGHT medial branches C2/C3 (TON), C3/C4, and C4/C5 was chosen. The risks and benefits were discussed in detail with the patient. Patient wants to proceed with the injection.     Anticoagulation/NPO Recommendations:   Patient does not need to hold any medications prior to the procedure. Patient will need to be NPO x 8 hours prior to the procedure.   We will start an IV prior to the procedure     Multidisciplinary Pain Management:   In the presence of complex, chronic, and multi-factorial pain, the importance of a multidisciplinary approach to pain management in the patients management regimen was emphasized and discussed in great detail.    PHYSICAL THERAPY: Patient is advised to see a physical therapist for gentle stretching exercises and conditioning exercises for management of pain.      Referrals:  None     Prescriptions Written This Visit:   Tramadol 50 mg (#60, 0 refills)     Follow-up: For RIGHT cervical RFA

## 2021-10-19 ENCOUNTER — TELEPHONE (OUTPATIENT)
Dept: PHYSICAL MEDICINE AND REHAB | Age: 41
End: 2021-10-19

## 2021-10-25 DIAGNOSIS — M54.2 PAIN OF CERVICAL FACET JOINT: Primary | ICD-10-CM

## 2021-10-28 ENCOUNTER — PREP FOR PROCEDURE (OUTPATIENT)
Dept: PHYSICAL MEDICINE AND REHAB | Age: 41
End: 2021-10-28

## 2021-11-04 ENCOUNTER — PATIENT MESSAGE (OUTPATIENT)
Dept: FAMILY MEDICINE CLINIC | Age: 41
End: 2021-11-04

## 2021-11-04 DIAGNOSIS — J42 CHRONIC BRONCHITIS, UNSPECIFIED CHRONIC BRONCHITIS TYPE (HCC): Primary | ICD-10-CM
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
## 2021-11-09 ENCOUNTER — APPOINTMENT (OUTPATIENT)
Dept: GENERAL RADIOLOGY | Age: 41
End: 2021-11-09
Attending: ANESTHESIOLOGY
Payer: MEDICARE

## 2021-11-09 ENCOUNTER — HOSPITAL ENCOUNTER (OUTPATIENT)
Age: 41
Setting detail: OUTPATIENT SURGERY
Discharge: HOME | End: 2021-11-09
Attending: ANESTHESIOLOGY | Admitting: ANESTHESIOLOGY
Payer: MEDICARE

## 2021-11-09 VITALS
TEMPERATURE: 98.1 F | DIASTOLIC BLOOD PRESSURE: 68 MMHG | HEART RATE: 80 BPM | WEIGHT: 150.2 LBS | SYSTOLIC BLOOD PRESSURE: 111 MMHG | OXYGEN SATURATION: 94 % | BODY MASS INDEX: 29.49 KG/M2 | RESPIRATION RATE: 16 BRPM | HEIGHT: 60 IN

## 2021-11-09 PROCEDURE — 3209999900 FLUORO FOR SURGICAL PROCEDURES

## 2021-11-09 PROCEDURE — 2709999900 HC NON-CHARGEABLE SUPPLY: Performed by: ANESTHESIOLOGY

## 2021-11-09 PROCEDURE — 64634 DESTROY C/TH FACET JNT ADDL: CPT | Performed by: ANESTHESIOLOGY

## 2021-11-09 PROCEDURE — 7100000011 HC PHASE II RECOVERY - ADDTL 15 MIN: Performed by: ANESTHESIOLOGY

## 2021-11-09 PROCEDURE — 99152 MOD SED SAME PHYS/QHP 5/>YRS: CPT | Performed by: ANESTHESIOLOGY

## 2021-11-09 PROCEDURE — 64633 DESTROY CERV/THOR FACET JNT: CPT | Performed by: ANESTHESIOLOGY

## 2021-11-09 PROCEDURE — 7100000010 HC PHASE II RECOVERY - FIRST 15 MIN: Performed by: ANESTHESIOLOGY

## 2021-11-09 PROCEDURE — 3600000057 HC PAIN LEVEL 4 ADDL 15 MIN: Performed by: ANESTHESIOLOGY

## 2021-11-09 PROCEDURE — 2500000003 HC RX 250 WO HCPCS: Performed by: ANESTHESIOLOGY

## 2021-11-09 PROCEDURE — 3600000056 HC PAIN LEVEL 4 BASE: Performed by: ANESTHESIOLOGY

## 2021-11-09 PROCEDURE — 6360000002 HC RX W HCPCS: Performed by: ANESTHESIOLOGY

## 2021-11-09 RX ORDER — FENTANYL CITRATE 50 UG/ML
INJECTION, SOLUTION INTRAMUSCULAR; INTRAVENOUS PRN
Status: DISCONTINUED | OUTPATIENT
Start: 2021-11-09 | End: 2021-11-09 | Stop reason: ALTCHOICE

## 2021-11-09 RX ORDER — LIDOCAINE HYDROCHLORIDE 10 MG/ML
INJECTION, SOLUTION EPIDURAL; INFILTRATION; INTRACAUDAL; PERINEURAL PRN
Status: DISCONTINUED | OUTPATIENT
Start: 2021-11-09 | End: 2021-11-09 | Stop reason: ALTCHOICE

## 2021-11-09 RX ORDER — MIDAZOLAM HYDROCHLORIDE 1 MG/ML
INJECTION INTRAMUSCULAR; INTRAVENOUS PRN
Status: DISCONTINUED | OUTPATIENT
Start: 2021-11-09 | End: 2021-11-09 | Stop reason: ALTCHOICE

## 2021-11-09 RX ORDER — LIDOCAINE HYDROCHLORIDE 20 MG/ML
INJECTION, SOLUTION EPIDURAL; INFILTRATION; INTRACAUDAL; PERINEURAL PRN
Status: DISCONTINUED | OUTPATIENT
Start: 2021-11-09 | End: 2021-11-09 | Stop reason: ALTCHOICE

## 2021-11-09 ASSESSMENT — PAIN - FUNCTIONAL ASSESSMENT: PAIN_FUNCTIONAL_ASSESSMENT: 0-10

## 2021-11-09 ASSESSMENT — PAIN SCALES - GENERAL: PAINLEVEL_OUTOF10: 0

## 2021-11-09 ASSESSMENT — PAIN DESCRIPTION - DESCRIPTORS: DESCRIPTORS: ACHING;CONSTANT

## 2021-11-09 NOTE — POST SEDATION
[address] [address]  Sedation/Analgesia Post Sedation Record    Pt Name: Melida Martel  MRN: [id number]  YOB: 1980  Procedure Performed By: Montez Villela DO  Primary Care Physician: EVA Grey CNP    POST-PROCEDURE    Physicians/Assistants: Montez Villela DO  Procedure Performed: See Procedure Note   Sedation/Anesthesia: Versed and Fentanyl (See procedure note for amount and duration)  Estimated Blood Loss:     0  ml  Specimens Removed: None        Complications: None           Pablo Butterfield,   Electronically signed 11/9/2021 at 12:31 PM

## 2021-11-09 NOTE — PROGRESS NOTES
1136 To recovery via cart. Spont resp. VSS. Report received from surgical rn. HOB elevated. Denies nausea pain numbness or tingling. Snack and drink given. Call bell in reach  1150 IV discontinued.  To dress self  1155 Discharge to home In stable ambulatory condition with family member

## 2021-11-09 NOTE — PROCEDURES
Pre-operative Diagnosis: Cervical facet pain     Post-operative Diagnosis: Cervical facet pain     Procedure: RIGHT cervical thermal radiofrequency ablation targeting C2/C3, C3/C4, and C4/C5     Procedure Description:  After consent was obtained, the patient was placed in the prone position with pressure points appropriately padded. The neck was prepped with Chloraprep and draped in the usual sterile fashion. 0.5 mL of 1% lidocaine was used for local anesthesia of the skin and superficial subcutaneous tissues. 22-gauge 100 mm SMK cannula with a 5 mm active tip was advanced under fluoroscopy in the PA view to reach to the waist of the lateral pillar(s) of the respective vertebral body of C2/C3, C3, C4, and C5. The cannula was then further advanced in lateral view to reach the middle of the trapezoid body of the lateral pillar(s) of these vertebrae. After confirming the position of the needle with fluoroscopy, aspiration was performed and was negative for heme or CSF fluid. There were no paresthesias. Sensory and motor stimulation at 50 Hz and 2 Hz, respectively, as well as impedance measurements were carried out having reached threshold on:     RIGHT  C2/C3: 0.2V/3V/190 Ohms  C3: 0.2V/3V/100 Ohms  C4: 0.2V/3V/160 Ohms  C5: 0.2V/3V/175 Ohms     Then, 1 mL of 2% Lidocaine was injected at each site. Temperature was then raised to 80 degrees centigrade for 90 seconds with a 15 second temperature ramp. No pain was reported during the lesioning. The needles were then withdrawn without complications. The patient tolerated the procedure well and discharged in ambulatory fashion.         Procedural Complications: None  Estimated Blood Loss: 0 mL       IV sedation was used during the procedure:  - Moderate intravenous conscious sedation was supervised by Dr. Manjula Gutierrez  - The patient was independently monitored by a Registered Nurse assigned to the procedure room  - Monitoring included automated blood pressure,

## 2021-11-09 NOTE — PRE SEDATION
[address] [address]  Pre-Sedation/Analgesia History & Physical    Pt Name: Troy Vargas  MRN: [id number]  YOB: 1980  Provider Performing Procedure: Lanre Ray DO   Primary Care Physician: EVA Stanton CNP      MEDICAL HISTORY       has a past medical history of Anxiety, Asthma, COPD (chronic obstructive pulmonary disease) ([address] Depression, and Migraines. SURGICAL HISTORY   has a past surgical history that includes Tonsillectomy and adenoidectomy (1989); Ovary removal (2000); Cholecystectomy (2003); Tubal ligation (2005); Hysterectomy (2011); Dilation and curettage of uterus (2009); IGS Endo Sinus Sx (07/12/2017); Tooth Extraction; Facet joint injection (Right, 8/10/2021); and Facet joint injection (Right, 9/21/2021). ALLERGIES   Allergies as of 10/19/2021 - Fully Reviewed 10/15/2021   Allergen Reaction Noted    Botox [onabotulinumtoxina (cosmetic)]  02/20/2019    Other  02/20/2019    Topamax [topiramate]  02/20/2019    Acetaminophen Nausea And Vomiting 10/19/2017    Aspirin Nausea And Vomiting 03/09/2015    Ibuprofen Nausea And Vomiting 03/09/2015       MEDICATIONS   Prior to Admission medications    Medication Sig Start Date End Date Taking? Authorizing Provider   traMADol (ULTRAM) 50 MG tablet Take 1 tablet by mouth 2 times daily as needed for Pain for up to 30 days. 10/15/21 11/14/21 Yes Pablo Ge DO   gabapentin (NEURONTIN) 800 MG tablet Take 1 tablet by mouth 4 times daily for 180 days.  10/5/21 4/3/22 Yes EVA Stanton CNP   vilazodone HCl (VILAZODONE HCL) 20 MG TABS Take 1 tablet by mouth once daily 10/5/21  Yes EVA Stanton CNP   amitriptyline (ELAVIL) 150 MG tablet Take 1 tablet by mouth daily 10/5/21  Yes EVA Stanton CNP   OLANZapine (ZYPREXA) 5 MG tablet Take 1 tablet by mouth nightly 10/5/21  Yes EVA Stanton CNP   ketorolac (TORADOL) 10 MG tablet Take 1 tablet by mouth every 8 hours as needed for Pain 10/4/21  Yes EVA Roca CNP   ondansetron (ZOFRAN ODT) 4 MG disintegrating tablet Take 1 tablet by mouth every 8 hours as needed for Nausea or Vomiting 9/14/21  Yes EVA Swain CNP   baclofen (LIORESAL) 10 MG tablet Take 1 tablet by mouth 3 times daily 8/17/21  Yes Pablo Ge DO   SYMBICORT 80-4.5 MCG/ACT AERO INHALE 2 PUFFS BY MOUTH TWICE DAILY, RINSE MOUTH AFTER USE 10/15/20  Yes EVA Roca CNP   Multiple Vitamins-Calcium (ONE-A-DAY WOMENS PO) Take by mouth daily    Yes Historical Provider, MD   nicotine polacrilex (NICORETTE) 2 MG gum Take 1 each by mouth as needed for Smoking cessation Maximum dose: 24 pieces/24 hours. 10/15/21   EVA Roca CNP   saline nasal gel (AYR) GEL by Nasal route daily as needed for Congestion 10/5/21   EVA Roca CNP   clonazePAM (KLONOPIN) 1 MG tablet Take 1 tablet by mouth nightly as needed for Anxiety for up to 30 days. 9/13/21 10/13/21  EVA Roca CNP   albuterol sulfate HFA (VENTOLIN HFA) 108 (90 Base) MCG/ACT inhaler Inhale 1-2 puffs into the lungs every 6 hours as needed for Wheezing 5/4/21   EVA Roca CNP   albuterol (PROVENTIL) (2.5 MG/3ML) 0.083% nebulizer solution Take 3 mLs by nebulization every 6 hours as needed for Wheezing 12/7/20   EVA Roca CNP   Respiratory Therapy Supplies (NEBULIZER COMPRESSOR) KIT 1 kit by Does not apply route once for 1 dose 9/5/19 9/5/19  EVA Roca CNP     PHYSICAL:   Vitals:    11/09/21 1136   BP: 111/68   Pulse: 80   Resp: 16   Temp: 98.1 °F (36.7 °C)   SpO2: 94%     PLANNED PROCEDURE   See procedure note  SEDATION  Planned agent: Versed and Fentanyl  ASA Classification: 1  Class 1: A normal healthy patient  Class 2: Pt with mild to moderate systemic disease  Class 3: Severe systemic disease or disturbance  Class 4: Severe systemic disorders that are already life threatening.   Class 5: Moribund pt with little chances of survival, for more than 24 hours. Mallampati I Airway Classification: 1    1. Pre-procedure diagnostic studies complete and results available. 2. Previous sedation/anesthesia experiences assessed. 3. The patient is an appropriate candidate to undergo the planned procedure sedation and anesthesia. (Refer to nursing sedation/analgesia documentation record)  4. Formulation and discussion of sedation/procedure plan, risks, and expectations with patient and/or responsible adult completed. 5. Patient examined immediately prior to the procedure.  (Refer to nursing sedation/analgesia documentation record)    Edilberto Marquis DO  Electronically signed 11/9/2021 at 12:31 PM

## 2021-11-17 ENCOUNTER — PATIENT MESSAGE (OUTPATIENT)
Dept: PHYSICAL MEDICINE AND REHAB | Age: 41
End: 2021-11-17

## 2021-11-17 ENCOUNTER — TELEPHONE (OUTPATIENT)
Dept: FAMILY MEDICINE CLINIC | Age: 41
End: 2021-11-17

## 2021-11-17 ENCOUNTER — PATIENT MESSAGE (OUTPATIENT)
Dept: FAMILY MEDICINE CLINIC | Age: 41
End: 2021-11-17

## 2021-11-17 DIAGNOSIS — G89.29 OTHER CHRONIC PAIN: Primary | ICD-10-CM

## 2021-11-17 DIAGNOSIS — Z91.038 HISTORY OF ANAPHYLACTIC SHOCK DUE TO INSECT STING: Primary | ICD-10-CM

## 2021-11-17 DIAGNOSIS — R09.81 NASAL CONGESTION: ICD-10-CM

## 2021-11-17 RX ORDER — KETOROLAC TROMETHAMINE 10 MG/1
10 TABLET, FILM COATED ORAL EVERY 8 HOURS PRN
Qty: 20 TABLET | Refills: 0 | Status: SHIPPED | OUTPATIENT
Start: 2021-11-17 | End: 2021-12-16 | Stop reason: SDUPTHER

## 2021-11-17 RX ORDER — SODIUM CHLORIDE/ALOE VERA
GEL (GRAM) NASAL DAILY PRN
Qty: 3 EACH | Refills: 3 | Status: SHIPPED | OUTPATIENT
Start: 2021-11-17 | End: 2022-01-06 | Stop reason: SDUPTHER

## 2021-11-17 RX ORDER — EPINEPHRINE 0.3 MG/.3ML
INJECTION SUBCUTANEOUS
Qty: 2 EACH | Refills: 0 | Status: SHIPPED | OUTPATIENT
Start: 2021-11-17 | End: 2022-01-06 | Stop reason: SDUPTHER

## 2021-11-17 NOTE — TELEPHONE ENCOUNTER
PA request received from pharmacy for Vilazodone 20mg #30 for 30 days. PA submitted online at [email or web address] and pending review. PA request received from pharmacy for epi pen #2 for 30 days. PA submitted online at [email or web address] and pending review.

## 2021-11-17 NOTE — TELEPHONE ENCOUNTER
From: Blossom Akhtar  To: Cyril Escobar  Sent: 11/17/2021 6:34 AM EST  Subject: Heike Amos    Is there any way I can get my EpiPen script back? [place]

## 2021-11-17 NOTE — TELEPHONE ENCOUNTER
OARRS reviewed. UDS: Negative  Last seen: 10/15/2021.  Follow-up:   Future Appointments   Date Time Provider Raman Elmore   1/7/2022 10:40 AM Pablo Doan DO N [facility]

## 2021-11-17 NOTE — TELEPHONE ENCOUNTER
From: Ann Lyons  To: Dr. Desiree Mendoza: 11/17/2021 11:56 AM EST  Subject: Script    I need a new script for tramadol I woke up with a migraine so bad I e got double vision and can barely turn my head to the right

## 2021-11-18 RX ORDER — TRAMADOL HYDROCHLORIDE 50 MG/1
50 TABLET ORAL EVERY 6 HOURS PRN
Qty: 60 TABLET | Refills: 0 | Status: SHIPPED | OUTPATIENT
Start: 2021-11-18 | End: 2021-11-20 | Stop reason: SDUPTHER

## 2021-11-18 NOTE — TELEPHONE ENCOUNTER
Has satisfied insurance requirements. Has been on Viibryd for 1+ years.   Has failed preferred agents - Celexa, Prozac, Zoloft, Lexapro, Effexor, Cymbalta, Zyprexa, Seroquel, Abilify, Risperdal, Lamictal, Latuda and Buspar in past

## 2021-11-20 DIAGNOSIS — G89.29 OTHER CHRONIC PAIN: ICD-10-CM

## 2021-11-22 RX ORDER — TRAMADOL HYDROCHLORIDE 50 MG/1
50 TABLET ORAL EVERY 6 HOURS PRN
Qty: 60 TABLET | Refills: 0 | Status: SHIPPED | OUTPATIENT
Start: 2021-11-22 | End: 2021-12-21 | Stop reason: SDUPTHER

## 2021-11-22 NOTE — TELEPHONE ENCOUNTER
[employer] fax received APPROVAL on Vilazodone 20 mg tablets. Coverage 11/19/21-11/19/22. PA number: [id number]. Nataliia Schools notified via detailed VM.

## 2021-11-22 NOTE — TELEPHONE ENCOUNTER
OARRS reviewed. UDS: Negative   Last seen: 10/15/2021.  Follow-up:   Future Appointments   Date Time Provider Raman Elmore   1/7/2022 10:40 AM DO TIM Brennan [facility]

## 2021-12-16 RX ORDER — KETOROLAC TROMETHAMINE 10 MG/1
10 TABLET, FILM COATED ORAL EVERY 8 HOURS PRN
Qty: 20 TABLET | Refills: 0 | Status: SHIPPED | OUTPATIENT
Start: 2021-12-16 | End: 2022-01-06 | Stop reason: SDUPTHER

## 2022-01-06 ENCOUNTER — PATIENT MESSAGE (OUTPATIENT)
Dept: FAMILY MEDICINE CLINIC | Age: 42
End: 2022-01-06

## 2022-01-06 DIAGNOSIS — Z91.038 HISTORY OF ANAPHYLACTIC SHOCK DUE TO INSECT STING: ICD-10-CM

## 2022-01-06 DIAGNOSIS — R09.81 NASAL CONGESTION: ICD-10-CM

## 2022-01-06 DIAGNOSIS — J42 CHRONIC BRONCHITIS, UNSPECIFIED CHRONIC BRONCHITIS TYPE (HCC): ICD-10-CM

## 2022-01-06 DIAGNOSIS — G44.209 TENSION HEADACHE: ICD-10-CM

## 2022-01-06 RX ORDER — SODIUM CHLORIDE/ALOE VERA
GEL (GRAM) NASAL DAILY PRN
Qty: 3 EACH | Refills: 3 | Status: SHIPPED | OUTPATIENT
Start: 2022-01-06 | End: 2022-04-21

## 2022-01-06 RX ORDER — DILTIAZEM HYDROCHLORIDE 60 MG/1
TABLET, FILM COATED ORAL
Qty: 11 G | Refills: 11 | Status: SHIPPED | OUTPATIENT
Start: 2022-01-06 | End: 2022-06-16 | Stop reason: SDUPTHER

## 2022-01-06 RX ORDER — EPINEPHRINE 0.3 MG/.3ML
INJECTION SUBCUTANEOUS
Qty: 2 EACH | Refills: 0 | Status: SHIPPED | OUTPATIENT
Start: 2022-01-06 | End: 2022-04-21

## 2022-01-06 RX ORDER — TIZANIDINE 4 MG/1
4 TABLET ORAL EVERY 8 HOURS PRN
Qty: 90 TABLET | Refills: 5 | Status: SHIPPED | OUTPATIENT
Start: 2022-01-06 | End: 2022-04-21 | Stop reason: SDUPTHER

## 2022-01-06 RX ORDER — KETOROLAC TROMETHAMINE 10 MG/1
10 TABLET, FILM COATED ORAL EVERY 8 HOURS PRN
Qty: 20 TABLET | Refills: 0 | Status: SHIPPED | OUTPATIENT
Start: 2022-01-06 | End: 2022-02-24 | Stop reason: SDUPTHER

## 2022-01-06 RX ORDER — ALBUTEROL SULFATE 90 UG/1
1-2 AEROSOL, METERED RESPIRATORY (INHALATION) EVERY 6 HOURS PRN
Qty: 18 G | Refills: 3 | Status: SHIPPED | OUTPATIENT
Start: 2022-01-06 | End: 2022-04-21 | Stop reason: SDUPTHER

## 2022-01-07 ENCOUNTER — OFFICE VISIT (OUTPATIENT)
Dept: PHYSICAL MEDICINE AND REHAB | Age: 42
End: 2022-01-07
Payer: MEDICARE

## 2022-01-07 ENCOUNTER — TELEPHONE (OUTPATIENT)
Dept: FAMILY MEDICINE CLINIC | Age: 42
End: 2022-01-07

## 2022-01-07 VITALS
WEIGHT: 150 LBS | DIASTOLIC BLOOD PRESSURE: 88 MMHG | HEIGHT: 60 IN | SYSTOLIC BLOOD PRESSURE: 114 MMHG | BODY MASS INDEX: 29.45 KG/M2

## 2022-01-07 DIAGNOSIS — M54.2 PAIN OF CERVICAL FACET JOINT: Primary | ICD-10-CM

## 2022-01-07 DIAGNOSIS — R51.9 CHRONIC NONINTRACTABLE HEADACHE, UNSPECIFIED HEADACHE TYPE: ICD-10-CM

## 2022-01-07 DIAGNOSIS — M79.18 MYOFASCIAL PAIN: ICD-10-CM

## 2022-01-07 DIAGNOSIS — G89.29 OTHER CHRONIC PAIN: ICD-10-CM

## 2022-01-07 DIAGNOSIS — G44.86 CERVICOGENIC HEADACHE: ICD-10-CM

## 2022-01-07 DIAGNOSIS — G89.29 CHRONIC NONINTRACTABLE HEADACHE, UNSPECIFIED HEADACHE TYPE: ICD-10-CM

## 2022-01-07 PROCEDURE — 99214 OFFICE O/P EST MOD 30 MIN: CPT | Performed by: ANESTHESIOLOGY

## 2022-01-07 PROCEDURE — G8484 FLU IMMUNIZE NO ADMIN: HCPCS | Performed by: ANESTHESIOLOGY

## 2022-01-07 PROCEDURE — G8417 CALC BMI ABV UP PARAM F/U: HCPCS | Performed by: ANESTHESIOLOGY

## 2022-01-07 PROCEDURE — G8427 DOCREV CUR MEDS BY ELIG CLIN: HCPCS | Performed by: ANESTHESIOLOGY

## 2022-01-07 PROCEDURE — 4004F PT TOBACCO SCREEN RCVD TLK: CPT | Performed by: ANESTHESIOLOGY

## 2022-01-07 RX ORDER — GABAPENTIN 800 MG/1
800 TABLET ORAL 4 TIMES DAILY
Qty: 360 TABLET | Refills: 1 | Status: SHIPPED | OUTPATIENT
Start: 2022-01-07 | End: 2022-06-16 | Stop reason: SDUPTHER
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
## 2022-01-07 NOTE — TELEPHONE ENCOUNTER
I spoke with Kasey Salas with [employer] at ph# [phone] regarding above. New corrected ID # is [id number]. Since medication is an OTC medication she will need to submit and approve the request.  Medication information given along with DX code. She has approved this request and will fax approval to office.

## 2022-01-07 NOTE — TELEPHONE ENCOUNTER
PA request received from pharmacy for [place] Saline Nasal Gel. PA submitted online at [employer] and no eligibility was found. Please reconfirm the member's health plan coverage before re-submitting. I spoke with Kandis Cesar at the pharmacy and she states this is the correct insurance for patient and her other meds went through without a problem. She will put a note in patient's chart regarding this and that is can be purchased OTC in the meantime if needed.

## 2022-01-09 ENCOUNTER — PATIENT MESSAGE (OUTPATIENT)
Dept: FAMILY MEDICINE CLINIC | Age: 42
End: 2022-01-09

## 2022-01-09 DIAGNOSIS — Z20.828 EXPOSURE TO INFLUENZA: Primary | ICD-10-CM

## 2022-01-09 DIAGNOSIS — J42 CHRONIC BRONCHITIS, UNSPECIFIED CHRONIC BRONCHITIS TYPE (HCC): ICD-10-CM

## 2022-01-09 RX ORDER — ALBUTEROL SULFATE 2.5 MG/3ML
2.5 SOLUTION RESPIRATORY (INHALATION) EVERY 6 HOURS PRN
Qty: 120 EACH | Refills: 3 | Status: SHIPPED | OUTPATIENT
Start: 2022-01-09

## 2022-01-09 RX ORDER — OSELTAMIVIR PHOSPHATE 75 MG/1
75 CAPSULE ORAL DAILY
Qty: 10 CAPSULE | Refills: 0 | Status: SHIPPED | OUTPATIENT
Start: 2022-01-09 | End: 2022-01-19

## 2022-01-09 RX ORDER — PREDNISONE 50 MG/1
50 TABLET ORAL DAILY
Qty: 5 TABLET | Refills: 0 | Status: SHIPPED | OUTPATIENT
Start: 2022-01-09 | End: 2022-01-14

## 2022-01-09 NOTE — TELEPHONE ENCOUNTER
From: Yenifer Kevin  To: Greggalexei Lindsey  Sent: 1/9/2022 5:48 AM EST  Subject: Precautions     My whole house besides me has the flu. Is there anything that you can send in. I cannot get sick any more than I am. My chest hurts so bad from all the stuff in my lungs n sinuses.  Please help I dont want a hospital visit There isnt much left of me to help with the cushioning around my ribs

## 2022-01-24 DIAGNOSIS — G89.29 OTHER CHRONIC PAIN: ICD-10-CM

## 2022-01-24 NOTE — TELEPHONE ENCOUNTER
OARRS reviewed. UDS: NEGATIVE  Last seen: 1/7/2022.  Follow-up:   Future Appointments   Date Time Provider Raman Elmore   2/18/2022 11:20 AM DO TIM Galindo [facility] Pain [facility] - [address] [facility]

## 2022-01-26 ENCOUNTER — PATIENT MESSAGE (OUTPATIENT)
Dept: FAMILY MEDICINE CLINIC | Age: 42
End: 2022-01-26

## 2022-01-26 RX ORDER — TRAMADOL HYDROCHLORIDE 50 MG/1
50 TABLET ORAL 2 TIMES DAILY PRN
Qty: 60 TABLET | Refills: 0 | OUTPATIENT
Start: 2022-01-26 | End: 2022-02-25

## 2022-01-26 RX ORDER — AMOXICILLIN 875 MG/1
875 TABLET, COATED ORAL 2 TIMES DAILY
Qty: 20 TABLET | Refills: 0 | Status: SHIPPED | OUTPATIENT
Start: 2022-01-26 | End: 2022-02-05

## 2022-01-26 NOTE — TELEPHONE ENCOUNTER
From: Farrukh Expose  To: Norman Newman  Sent: 1/26/2022 6:44 AM EST  Subject: Prescription question     I finally after a two month challenge finally got an appointment with my dentist However its not until February 22. I still need amoxicillin because of how long I have to wait.  Im trying to see if he can send in a script but wanted to let you know as well

## 2022-01-27 ENCOUNTER — TELEPHONE (OUTPATIENT)
Dept: PHYSICAL MEDICINE AND REHAB | Age: 42
End: 2022-01-27

## 2022-01-27 NOTE — TELEPHONE ENCOUNTER
Called pt. And left message of refusal of script for Ultram by Dr. Dr. Delroy Apley and to be discussed at next f/u feb. 18th.

## 2022-02-18 DIAGNOSIS — G89.29 OTHER CHRONIC PAIN: ICD-10-CM

## 2022-02-18 NOTE — TELEPHONE ENCOUNTER
Zulema Basurto called requesting a refill on the following medications:  Requested Prescriptions     Pending Prescriptions Disp Refills    traMADol (ULTRAM) 50 MG tablet 60 tablet 0     Sig: Take 1 tablet by mouth 2 times daily as needed for Pain for up to 30 days. Take lowest dose possible to manage pain     Pharmacy verified: [address] [facility] [place] [facility]  . pv      Date of last visit: 1/07/2022  Date of next visit (if applicable): 6/66/4547

## 2022-02-21 RX ORDER — TRAMADOL HYDROCHLORIDE 50 MG/1
50 TABLET ORAL 2 TIMES DAILY PRN
Qty: 60 TABLET | Refills: 0 | OUTPATIENT
Start: 2022-02-21 | End: 2022-03-23

## 2022-02-21 NOTE — TELEPHONE ENCOUNTER
OARRS reviewed. UDS:no screen. Last seen: 1/7/2022.  Follow-up:   Future Appointments   Date Time Provider Raman Elmore   3/23/2022  1:20 PM DO TIM Butler [facility] Pain [facility] - [address] [facility]

## 2022-02-24 DIAGNOSIS — F43.9 TRAUMA AND STRESSOR-RELATED DISORDER: ICD-10-CM

## 2022-02-24 RX ORDER — KETOROLAC TROMETHAMINE 10 MG/1
10 TABLET, FILM COATED ORAL EVERY 8 HOURS PRN
Qty: 20 TABLET | Refills: 0 | Status: SHIPPED | OUTPATIENT
Start: 2022-02-24 | End: 2022-03-15 | Stop reason: ALTCHOICE

## 2022-02-24 RX ORDER — CLONAZEPAM 1 MG/1
1 TABLET ORAL NIGHTLY PRN
Qty: 30 TABLET | Refills: 2 | Status: SHIPPED | OUTPATIENT
Start: 2022-02-24 | End: 2022-06-16 | Stop reason: SDUPTHER

## 2022-02-25 ENCOUNTER — TELEPHONE (OUTPATIENT)
Dept: PHYSICAL MEDICINE AND REHAB | Age: 42
End: 2022-02-25

## 2022-02-25 NOTE — TELEPHONE ENCOUNTER
Called pt. To remind of no script to be  filled for Ultram till see  In office. Pt. Verbalized understanding.

## 2022-03-15 ENCOUNTER — APPOINTMENT (OUTPATIENT)
Dept: GENERAL RADIOLOGY | Age: 42
End: 2022-03-15
Payer: MEDICARE

## 2022-03-15 ENCOUNTER — HOSPITAL ENCOUNTER (EMERGENCY)
Age: 42
Discharge: HOME | End: 2022-03-15
Payer: MEDICARE

## 2022-03-15 VITALS
HEIGHT: 60 IN | SYSTOLIC BLOOD PRESSURE: 101 MMHG | HEART RATE: 71 BPM | OXYGEN SATURATION: 95 % | WEIGHT: 135 LBS | BODY MASS INDEX: 26.5 KG/M2 | TEMPERATURE: 97.1 F | DIASTOLIC BLOOD PRESSURE: 74 MMHG | RESPIRATION RATE: 16 BRPM

## 2022-03-15 DIAGNOSIS — R04.2 HEMOPTYSIS: ICD-10-CM

## 2022-03-15 DIAGNOSIS — M94.0 COSTOCHONDRITIS: Primary | ICD-10-CM

## 2022-03-15 PROCEDURE — 99214 OFFICE O/P EST MOD 30 MIN: CPT

## 2022-03-15 PROCEDURE — 96372 THER/PROPH/DIAG INJ SC/IM: CPT

## 2022-03-15 PROCEDURE — 71120 X-RAY EXAM BREASTBONE 2/>VWS: CPT

## 2022-03-15 PROCEDURE — 99213 OFFICE O/P EST LOW 20 MIN: CPT | Performed by: NURSE PRACTITIONER

## 2022-03-15 PROCEDURE — 71046 X-RAY EXAM CHEST 2 VIEWS: CPT

## 2022-03-15 PROCEDURE — 6360000002 HC RX W HCPCS: Performed by: NURSE PRACTITIONER

## 2022-03-15 RX ORDER — PREDNISONE 20 MG/1
40 TABLET ORAL DAILY
Qty: 10 TABLET | Refills: 0 | Status: SHIPPED | OUTPATIENT
Start: 2022-03-15 | End: 2022-03-20

## 2022-03-15 RX ORDER — KETOROLAC TROMETHAMINE 30 MG/ML
60 INJECTION, SOLUTION INTRAMUSCULAR; INTRAVENOUS ONCE
Status: COMPLETED | OUTPATIENT
Start: 2022-03-15 | End: 2022-03-15

## 2022-03-15 RX ADMIN — KETOROLAC TROMETHAMINE 60 MG: 30 INJECTION, SOLUTION INTRAMUSCULAR at 14:25

## 2022-03-15 ASSESSMENT — PAIN DESCRIPTION - PROGRESSION: CLINICAL_PROGRESSION: GRADUALLY WORSENING

## 2022-03-15 ASSESSMENT — ENCOUNTER SYMPTOMS
COUGH: 1
NAUSEA: 0
VOMITING: 0
SHORTNESS OF BREATH: 1

## 2022-03-15 ASSESSMENT — PAIN - FUNCTIONAL ASSESSMENT
PAIN_FUNCTIONAL_ASSESSMENT: 0-10
PAIN_FUNCTIONAL_ASSESSMENT: PREVENTS OR INTERFERES WITH ALL ACTIVE AND SOME PASSIVE ACTIVITIES

## 2022-03-15 ASSESSMENT — PAIN SCALES - GENERAL
PAINLEVEL_OUTOF10: 4
PAINLEVEL_OUTOF10: 9

## 2022-03-15 ASSESSMENT — PAIN DESCRIPTION - DESCRIPTORS: DESCRIPTORS: STABBING;SHARP;SHOOTING

## 2022-03-15 ASSESSMENT — PAIN DESCRIPTION - LOCATION: LOCATION: STERNUM;OTHER (COMMENT)

## 2022-03-15 ASSESSMENT — PAIN DESCRIPTION - PAIN TYPE: TYPE: ACUTE PAIN

## 2022-03-15 ASSESSMENT — PAIN DESCRIPTION - FREQUENCY: FREQUENCY: INTERMITTENT

## 2022-03-15 ASSESSMENT — PAIN DESCRIPTION - ONSET: ONSET: ON-GOING
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
## 2022-03-18 NOTE — ED NOTES
Dr real  Called and notified of nausea vomiting and diahrrean  cefjipme stopped   No change in patients condition. No adverse medication reaction noted with meds given. Discharge instructions and prescriptions discussed with pt. Pt. Verbalized understanding of info given and ambulated to exit in stable condition.       Emilee Fuentes RN  03/15/22 4652

## 2022-03-23 ENCOUNTER — OFFICE VISIT (OUTPATIENT)
Dept: PHYSICAL MEDICINE AND REHAB | Age: 42
End: 2022-03-23
Payer: MEDICARE

## 2022-03-23 VITALS
HEIGHT: 60 IN | BODY MASS INDEX: 26.5 KG/M2 | SYSTOLIC BLOOD PRESSURE: 94 MMHG | WEIGHT: 135 LBS | DIASTOLIC BLOOD PRESSURE: 66 MMHG

## 2022-03-23 DIAGNOSIS — M54.81 OCCIPITAL NEURALGIA OF RIGHT SIDE: Primary | ICD-10-CM

## 2022-03-23 DIAGNOSIS — G89.29 CHRONIC NONINTRACTABLE HEADACHE, UNSPECIFIED HEADACHE TYPE: ICD-10-CM

## 2022-03-23 DIAGNOSIS — M54.2 PAIN OF CERVICAL FACET JOINT: ICD-10-CM

## 2022-03-23 DIAGNOSIS — G89.29 OTHER CHRONIC PAIN: ICD-10-CM

## 2022-03-23 DIAGNOSIS — R51.9 CHRONIC NONINTRACTABLE HEADACHE, UNSPECIFIED HEADACHE TYPE: ICD-10-CM

## 2022-03-23 PROCEDURE — G8427 DOCREV CUR MEDS BY ELIG CLIN: HCPCS | Performed by: ANESTHESIOLOGY

## 2022-03-23 PROCEDURE — G8417 CALC BMI ABV UP PARAM F/U: HCPCS | Performed by: ANESTHESIOLOGY

## 2022-03-23 PROCEDURE — G8484 FLU IMMUNIZE NO ADMIN: HCPCS | Performed by: ANESTHESIOLOGY

## 2022-03-23 PROCEDURE — 64405 NJX AA&/STRD GR OCPL NRV: CPT | Performed by: ANESTHESIOLOGY

## 2022-03-23 PROCEDURE — 4004F PT TOBACCO SCREEN RCVD TLK: CPT | Performed by: ANESTHESIOLOGY

## 2022-03-23 PROCEDURE — 99214 OFFICE O/P EST MOD 30 MIN: CPT | Performed by: ANESTHESIOLOGY

## 2022-03-23 RX ORDER — TRIAMCINOLONE ACETONIDE 40 MG/ML
40 INJECTION, SUSPENSION INTRA-ARTICULAR; INTRAMUSCULAR ONCE
Status: COMPLETED | OUTPATIENT
Start: 2022-03-23 | End: 2022-03-24

## 2022-03-23 RX ORDER — TRAMADOL HYDROCHLORIDE 50 MG/1
50 TABLET ORAL 2 TIMES DAILY PRN
Qty: 60 TABLET | Refills: 0 | Status: SHIPPED | OUTPATIENT
Start: 2022-03-23 | End: 2022-04-21 | Stop reason: SDUPTHER
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
## 2022-03-23 NOTE — PROGRESS NOTES
Pre-operative Diagnosis:  RIGHT occipital neuralgia      Post-operative Diagnosis: RIGHT occipital neuralgia      Procedure: RIGHT occipital nerve blocks     Procedure Description:  After having signed the informed consent, the patient was seated in a chair. The patient's posterior occiput region was prepped with alcohol wipes. The occipital protuberance and mastoid processes were palpated. Moving inferior lateral one third this distance from the occipital protuberance to the right, the right occipital nerve was blocked using a 25-gauge 1/2 inch needle inserted directly through skin to lie over the bone medial to the occipital artery. After negative aspiration, 20 mg of kenalog mixed with 4 cc of 0.5% bupivacaine were injected. The needle was removed without any complications. The patient tolerated the procedure well.      Procedural Complications: None        Pablo Cohn DO  Interventional Pain Management/PM&R   [place]

## 2022-03-24 RX ADMIN — TRIAMCINOLONE ACETONIDE 40 MG: 40 INJECTION, SUSPENSION INTRA-ARTICULAR; INTRAMUSCULAR at 11:53

## 2022-04-21 ENCOUNTER — OFFICE VISIT (OUTPATIENT)
Dept: FAMILY MEDICINE CLINIC | Age: 42
End: 2022-04-21
Payer: MEDICARE

## 2022-04-21 VITALS
RESPIRATION RATE: 16 BRPM | DIASTOLIC BLOOD PRESSURE: 80 MMHG | OXYGEN SATURATION: 97 % | SYSTOLIC BLOOD PRESSURE: 118 MMHG | BODY MASS INDEX: 27.93 KG/M2 | WEIGHT: 143 LBS | HEART RATE: 76 BPM

## 2022-04-21 DIAGNOSIS — J42 CHRONIC BRONCHITIS, UNSPECIFIED CHRONIC BRONCHITIS TYPE (HCC): ICD-10-CM

## 2022-04-21 DIAGNOSIS — R56.9 SEIZURE-LIKE ACTIVITY (HCC): ICD-10-CM

## 2022-04-21 DIAGNOSIS — M79.18 CERVICAL MYOFASCIAL PAIN SYNDROME: ICD-10-CM

## 2022-04-21 DIAGNOSIS — M54.81 BILATERAL OCCIPITAL NEURALGIA: ICD-10-CM

## 2022-04-21 DIAGNOSIS — F33.2 SEVERE EPISODE OF RECURRENT MAJOR DEPRESSIVE DISORDER, WITHOUT PSYCHOTIC FEATURES (HCC): ICD-10-CM

## 2022-04-21 DIAGNOSIS — G44.86 CERVICOGENIC HEADACHE: ICD-10-CM

## 2022-04-21 DIAGNOSIS — G89.29 OTHER CHRONIC PAIN: ICD-10-CM

## 2022-04-21 DIAGNOSIS — G44.209 TENSION HEADACHE: ICD-10-CM

## 2022-04-21 DIAGNOSIS — R42 CERVICAL VERTIGO: Primary | ICD-10-CM

## 2022-04-21 DIAGNOSIS — F43.9 TRAUMA AND STRESSOR-RELATED DISORDER: ICD-10-CM

## 2022-04-21 PROCEDURE — 4004F PT TOBACCO SCREEN RCVD TLK: CPT | Performed by: NURSE PRACTITIONER

## 2022-04-21 PROCEDURE — 3023F SPIROM DOC REV: CPT | Performed by: NURSE PRACTITIONER

## 2022-04-21 PROCEDURE — 99214 OFFICE O/P EST MOD 30 MIN: CPT | Performed by: NURSE PRACTITIONER

## 2022-04-21 PROCEDURE — G8417 CALC BMI ABV UP PARAM F/U: HCPCS | Performed by: NURSE PRACTITIONER

## 2022-04-21 PROCEDURE — G8427 DOCREV CUR MEDS BY ELIG CLIN: HCPCS | Performed by: NURSE PRACTITIONER

## 2022-04-21 RX ORDER — VILAZODONE HYDROCHLORIDE 40 MG/1
TABLET ORAL
Qty: 30 TABLET | Refills: 2 | Status: SHIPPED | OUTPATIENT
Start: 2022-04-21 | End: 2022-09-17 | Stop reason: SDUPTHER

## 2022-04-21 RX ORDER — ALBUTEROL SULFATE 90 UG/1
1-2 AEROSOL, METERED RESPIRATORY (INHALATION) EVERY 6 HOURS PRN
Qty: 18 G | Refills: 3 | Status: SHIPPED | OUTPATIENT
Start: 2022-04-21 | End: 2022-06-16 | Stop reason: SDUPTHER

## 2022-04-21 RX ORDER — MECLIZINE HYDROCHLORIDE 25 MG/1
25-50 TABLET ORAL 3 TIMES DAILY PRN
Qty: 21 TABLET | Refills: 0 | Status: SHIPPED | OUTPATIENT
Start: 2022-04-21 | End: 2022-05-24 | Stop reason: SDUPTHER

## 2022-04-21 RX ORDER — TIZANIDINE 4 MG/1
4 TABLET ORAL EVERY 8 HOURS PRN
Qty: 90 TABLET | Refills: 5 | Status: SHIPPED | OUTPATIENT
Start: 2022-04-21 | End: 2022-06-16 | Stop reason: SDUPTHER

## 2022-04-21 RX ORDER — CLONAZEPAM 1 MG/1
1 TABLET ORAL NIGHTLY PRN
Qty: 30 TABLET | Refills: 2 | OUTPATIENT
Start: 2022-04-21 | End: 2022-05-21

## 2022-04-21 RX ORDER — TRAMADOL HYDROCHLORIDE 50 MG/1
50 TABLET ORAL 2 TIMES DAILY PRN
Qty: 60 TABLET | Refills: 0 | Status: SHIPPED | OUTPATIENT
Start: 2022-04-22 | End: 2022-05-23

## 2022-04-21 ASSESSMENT — PATIENT HEALTH QUESTIONNAIRE - PHQ9
4. FEELING TIRED OR HAVING LITTLE ENERGY: 0
3. TROUBLE FALLING OR STAYING ASLEEP: 0
9. THOUGHTS THAT YOU WOULD BE BETTER OFF DEAD, OR OF HURTING YOURSELF: 0
7. TROUBLE CONCENTRATING ON THINGS, SUCH AS READING THE NEWSPAPER OR WATCHING TELEVISION: 0
8. MOVING OR SPEAKING SO SLOWLY THAT OTHER PEOPLE COULD HAVE NOTICED. OR THE OPPOSITE, BEING SO FIGETY OR RESTLESS THAT YOU HAVE BEEN MOVING AROUND A LOT MORE THAN USUAL: 0
10. IF YOU CHECKED OFF ANY PROBLEMS, HOW DIFFICULT HAVE THESE PROBLEMS MADE IT FOR YOU TO DO YOUR WORK, TAKE CARE OF THINGS AT HOME, OR GET ALONG WITH OTHER PEOPLE: 0
2. FEELING DOWN, DEPRESSED OR HOPELESS: 0
SUM OF ALL RESPONSES TO PHQ QUESTIONS 1-9: 0
6. FEELING BAD ABOUT YOURSELF - OR THAT YOU ARE A FAILURE OR HAVE LET YOURSELF OR YOUR FAMILY DOWN: 0
SUM OF ALL RESPONSES TO PHQ QUESTIONS 1-9: 0
5. POOR APPETITE OR OVEREATING: 0

## 2022-04-21 ASSESSMENT — ENCOUNTER SYMPTOMS
NAUSEA: 0
COUGH: 0
ABDOMINAL PAIN: 0
SHORTNESS OF BREATH: 0
BACK PAIN: 1

## 2022-04-21 NOTE — PROGRESS NOTES
Reggie Barcenas (1980) 40 y.o. female here for evaluation of the following chief complaint(s):      HPI:  Chief Complaint   Patient presents with    Headache     with dizziness- passed out yesterday episode lasting 1 1/2 per son -Dr. Silvestre Melo did to nerve blocks        Chronic neck issues since Summer 2020. Had a NCS that showed chronic C5C6 radiculopathy. RIGHT occipital nerve blocks on 3/23/22 and developed dizziness since that time. Room spinning sensation - with sitting, standing, or turning over. Dizziness will trigger migraines. Follows with PAIN CLINIC       Benefit with neck and arm pain with Elavil 150 mg, Zanaflex 4 mg TID and Gabapentin 800 mg QID for HA. MRI Cervical 4/27/21  FINDINGS:               The cervical vertebral bodies are normally aligned. There is normal marrow signal throughout. No suspicious osseous lesions are present.  The facet joints are normally aligned.       The cervical spinal cord is of normal caliber and signal intensity.  The visualized aspects of the posterior fossa are normal.       On the axial images, there is normal signal throughout the cervical spinal cord. No degenerative changes are noted. There is no spinal canal or foraminal stenosis. There are no suspicious findings in the cervical soft tissues.               Impression        Normal MRI of the cervical spine. Mood not controlled. High stressors recently. Using Klonopin at night to help sleep and shut off brain. Was previous doing well on Viibryd 20 mg prior to acute stressors. No longer on Zyprexa. Has been on Celexa, Prozac, Zoloft, Lexapro, Effexor, Cymbalta, Abilify, Risperdal, Lamictal, Latuda, Buspar, Depakote and Seroquel.      Vitals:    04/21/22 1103   BP: 118/80   Pulse: 76   Resp: 16   SpO2: 97%       Patient Active Problem List   Diagnosis    Smoking addiction    COPD (chronic obstructive pulmonary disease) (HCC)    ANTHONY (generalized anxiety disorder)    Chronic pansinusitis    Bilateral occipital neuralgia    Cervicogenic headache    Chronic migraine without aura without status migrainosus, not intractable    Seizure-like activity (HCC)    Severe episode of recurrent major depressive disorder, without psychotic features ([address]       SUBJECTIVE/OBJECTIVE:  Review of Systems   Constitutional: Negative for chills and fever. HENT: Negative. Respiratory: Negative for cough and shortness of breath. Cardiovascular: Negative for chest pain. Gastrointestinal: Negative for abdominal pain and nausea. Musculoskeletal: Positive for arthralgias, back pain, neck pain and neck stiffness. Skin: Negative for rash. Neurological: Positive for dizziness and headaches. Negative for weakness, light-headedness and numbness. Psychiatric/Behavioral: Positive for dysphoric mood and sleep disturbance. The patient is nervous/anxious and is hyperactive. Physical Exam  Constitutional:       General: She is not in acute distress. Eyes:      Pupils: Pupils are equal, round, and reactive to light. Cardiovascular:      Rate and Rhythm: Normal rate and regular rhythm. Heart sounds: No murmur heard. Pulmonary:      Effort: Pulmonary effort is normal.      Breath sounds: Normal breath sounds. No wheezing. Abdominal:      General: Bowel sounds are normal. There is no distension. Palpations: Abdomen is soft. Tenderness: There is no abdominal tenderness. Musculoskeletal:      Right hand: Decreased strength. Decreased sensation. Left hand: Decreased strength. Decreased sensation. Cervical back: Normal range of motion and neck supple. Tenderness present. Thoracic back: Tenderness present. Decreased range of motion. Skin:     General: Skin is warm and dry. Findings: No rash. Neurological:      Cranial Nerves: Cranial nerves are intact. Sensory: Sensation is intact. Motor: Motor function is intact.       Coordination: Coordination is intact. ASSESSMENT/PLAN:   Diagnosis Orders   1. Cervical vertigo  [facility]    meclizine (ANTIVERT) 25 MG tablet   2. Bilateral occipital neuralgia     3. Cervicogenic headache     4. Cervical myofascial pain syndrome     5. Severe episode of recurrent major depressive disorder, without psychotic features (HCC)  vilazodone HCl (VILAZODONE HCL) 40 MG TABS   6. Chronic bronchitis, unspecified chronic bronchitis type ([address]     7. Seizure-like activity ([address]           MDM:  Follow back up with PAIN CLINIC for neck pain   Epleys POS   Meclizine TID PRN   Refer to PT   Increase Viibryd 40 mg   Counseling   RTO in 1 month    An electronic signature was used to authenticate this note.     --Timothy Moses, APRN - CNP

## 2022-05-03 ENCOUNTER — HOSPITAL ENCOUNTER (OUTPATIENT)
Dept: PHYSICAL THERAPY | Age: 42
Setting detail: THERAPIES SERIES
Discharge: HOME | End: 2022-05-03
Payer: MEDICARE

## 2022-05-10 ENCOUNTER — HOSPITAL ENCOUNTER (OUTPATIENT)
Dept: PHYSICAL THERAPY | Age: 42
Setting detail: THERAPIES SERIES
Discharge: HOME | End: 2022-05-10
Payer: MEDICARE

## 2022-05-10 PROCEDURE — 97162 PT EVAL MOD COMPLEX 30 MIN: CPT

## 2022-05-10 PROCEDURE — 97110 THERAPEUTIC EXERCISES: CPT
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
## 2022-05-17 ENCOUNTER — HOSPITAL ENCOUNTER (OUTPATIENT)
Dept: PHYSICAL THERAPY | Age: 42
Setting detail: THERAPIES SERIES
Discharge: HOME | End: 2022-05-17
Payer: MEDICARE

## 2022-05-17 PROCEDURE — 95992 CANALITH REPOSITIONING PROC: CPT

## 2022-05-17 PROCEDURE — 97110 THERAPEUTIC EXERCISES: CPT
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
## 2022-05-20 ENCOUNTER — HOSPITAL ENCOUNTER (OUTPATIENT)
Dept: PHYSICAL THERAPY | Age: 42
Setting detail: THERAPIES SERIES
End: 2022-05-20
Payer: MEDICARE

## 2022-05-21 DIAGNOSIS — G89.29 OTHER CHRONIC PAIN: ICD-10-CM

## 2022-05-23 RX ORDER — TRAMADOL HYDROCHLORIDE 50 MG/1
TABLET ORAL
Qty: 60 TABLET | Refills: 0 | Status: SHIPPED | OUTPATIENT
Start: 2022-05-23 | End: 2022-06-16 | Stop reason: SDUPTHER

## 2022-05-24 ENCOUNTER — HOSPITAL ENCOUNTER (OUTPATIENT)
Dept: PHYSICAL THERAPY | Age: 42
Setting detail: THERAPIES SERIES
Discharge: HOME | End: 2022-05-24
Payer: MEDICARE

## 2022-05-24 ENCOUNTER — OFFICE VISIT (OUTPATIENT)
Dept: FAMILY MEDICINE CLINIC | Age: 42
End: 2022-05-24
Payer: MEDICARE

## 2022-05-24 VITALS
RESPIRATION RATE: 16 BRPM | DIASTOLIC BLOOD PRESSURE: 58 MMHG | SYSTOLIC BLOOD PRESSURE: 106 MMHG | BODY MASS INDEX: 27.36 KG/M2 | HEART RATE: 68 BPM | WEIGHT: 140.1 LBS

## 2022-05-24 DIAGNOSIS — Z13.220 SCREENING FOR HYPERLIPIDEMIA: ICD-10-CM

## 2022-05-24 DIAGNOSIS — F33.2 SEVERE EPISODE OF RECURRENT MAJOR DEPRESSIVE DISORDER, WITHOUT PSYCHOTIC FEATURES (HCC): ICD-10-CM

## 2022-05-24 DIAGNOSIS — J42 CHRONIC BRONCHITIS, UNSPECIFIED CHRONIC BRONCHITIS TYPE (HCC): ICD-10-CM

## 2022-05-24 DIAGNOSIS — M54.81 BILATERAL OCCIPITAL NEURALGIA: ICD-10-CM

## 2022-05-24 DIAGNOSIS — F17.200 SMOKING: ICD-10-CM

## 2022-05-24 DIAGNOSIS — G44.86 CERVICOGENIC HEADACHE: ICD-10-CM

## 2022-05-24 DIAGNOSIS — M79.18 CERVICAL MYOFASCIAL PAIN SYNDROME: ICD-10-CM

## 2022-05-24 DIAGNOSIS — R42 CERVICAL VERTIGO: Primary | ICD-10-CM
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
## 2022-05-24 PROBLEM — S93.401A SPRAIN OF RIGHT ANKLE: Status: ACTIVE | Noted: 2022-05-24

## 2022-05-24 PROBLEM — S46.919A STRAIN OF SHOULDER: Status: ACTIVE | Noted: 2022-05-24

## 2022-05-24 PROCEDURE — 97035 APP MDLTY 1+ULTRASOUND EA 15: CPT

## 2022-05-24 PROCEDURE — 3023F SPIROM DOC REV: CPT | Performed by: NURSE PRACTITIONER

## 2022-05-24 PROCEDURE — 97110 THERAPEUTIC EXERCISES: CPT

## 2022-05-24 PROCEDURE — 99214 OFFICE O/P EST MOD 30 MIN: CPT | Performed by: NURSE PRACTITIONER

## 2022-05-24 PROCEDURE — G8427 DOCREV CUR MEDS BY ELIG CLIN: HCPCS | Performed by: NURSE PRACTITIONER

## 2022-05-24 PROCEDURE — G8417 CALC BMI ABV UP PARAM F/U: HCPCS | Performed by: NURSE PRACTITIONER

## 2022-05-24 PROCEDURE — 4004F PT TOBACCO SCREEN RCVD TLK: CPT | Performed by: NURSE PRACTITIONER

## 2022-05-24 RX ORDER — SODIUM CHLORIDE/ALOE VERA
1 GEL (GRAM) NASAL 2 TIMES DAILY PRN
COMMUNITY
Start: 2022-05-21 | End: 2022-08-24

## 2022-05-24 RX ORDER — KETOROLAC TROMETHAMINE 10 MG/1
1 TABLET, FILM COATED ORAL 2 TIMES DAILY PRN
COMMUNITY
Start: 2022-05-21 | End: 2022-08-24

## 2022-05-24 RX ORDER — MECLIZINE HYDROCHLORIDE 25 MG/1
25-50 TABLET ORAL 3 TIMES DAILY PRN
Qty: 60 TABLET | Refills: 1 | Status: SHIPPED | OUTPATIENT
Start: 2022-05-24 | End: 2022-05-31

## 2022-05-24 ASSESSMENT — ENCOUNTER SYMPTOMS
SHORTNESS OF BREATH: 0
ABDOMINAL PAIN: 0
COUGH: 0
BACK PAIN: 1
NAUSEA: 0
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
## 2022-05-24 NOTE — PROGRESS NOTES
Time   [address] [facility]  PHYSICAL THERAPY  [] EVALUATION  [x] DAILY NOTE (LAND) [] DAILY NOTE (AQUATIC ) [] PROGRESS NOTE [] DISCHARGE NOTE    [] [facility]   [x] Eliazarjair     [] [facility]   [] Pietro Flores    Date: 2022  Patient Name:  Dora Phan  : 1980  MRN: [id number]  CSN: [id number]    Referring Practitioner EVA Alarcon -*   Diagnosis Dizziness and giddiness [R42]    Treatment Diagnosis Neck pain, dizziness   Date of Evaluation 5/10/22   Additional Pertinent History Irregular hearbeat, vertigo/dizziness, bi-polar, COD, Emphysema, anxiety. Dx with carpal tunnel problems- surgery recommended but wants to get HA/neck taken care of this 1st.       Functional Outcome Measure Used Dizziness handicap inventory   Functional Outcome Score eval score 80/100 (5/10/22)       Insurance: Primary: Payor: Yrn Ryan /  /  / ,   Secondary:    Authorization Information: Justin Womack at 100%, 30 visits per calendar year, modalities covered except ionto/MHP/CP not covered. Visit # 3, 3/10 for progress note   Visits Allowed: 30   Recertification Date:    Physician Follow-Up: F/u with Dr. Kevin Bauer ,  May 24  f/u with Dr Denice Lorenzo   Physician Orders:    History of Present Illness: 30+ year history of neck pain with chiropractor since 10years old. Had been seeing Dr. Ольга Hess chiropractor since 10years old but moved to [facility]OSP[facility] [address] and too far away , none x 2 years. Patient reports having HA's x 30 years as well. Patient reports 2 years ago started having severe increase in HA's with \"seizure-like\" symptoms- has lots of shaking, eye twitching, vommitting, carried to bed- these happen 2 x per month. Was every day with these severe s/s and did injections on R side . Tried several neurologists, referred to pain management.    Has done multiple injections- 3 sets of 4 injections in neck, nerve block in occipital lobe. Migraines 3 x per week with severe s/s with shaking, vommitting, eyes twitching. SUBJECTIVE:  Did try HEP chin tucks- does 3 sets of 10- doing 2 x per day- does lessen pain for a \"little bit\" but then pain returns. Neck pain 100% decreased to 75% of the day,  High 10/10, average 6/10  HA- in occiput 100% of the day decreased to 75% of the day, high 10/10, average 8/10    Dizziness decreased from 100% to 75%of the day, high 10/10, average 8/10- decreased dizziness with looking up, still increased dizziness bending over, still increased dizziness turning head to right, decreased dizziness turning to Left. R UE to all 5 fingers numbness 100% of the day, high 10/10, average   L UE wrist to hand 50% of the day, high 7/10, average 3/10. Patient was able to follow BPPV precautions.       OBJECTIVE:    TREATMENT   Precautions: \" no heavy lifting \" per Dr. Jose J Hutchinson but no sure if this is in writing , + dizziness   Pain: Neck pain 0/10, occiput 0/10, top of head 0/10, R UE to hand 4/10, L hand 1/10    X in shaded column indicates activity completed today   Modalities Parameters/  Location  Notes   Ultrasound 50% pulsed, 1 mHz Cervical spine, 8 minutes sitting, 1.0 intensity x                Manual Therapy Time/Technique  Notes                     Exercise/Intervention   Notes   Gentle cervical retractions minimal pressure 3 x 10 decreased neck pain 7 to 4/10      Education on cervical neutral posture and importance of posture                     R ronen garcia pike + with severe dizziness 8/10 and nystagmus noted       Canalith repositioning maneuver CRM- (R epley)    Dizziness decreased from 8/10 to 2/10 upon leaving   Education on 24 hours BPPV precautions, sleeping in chair tonight       Cervical  retraction 3 x 10   x    Backward shoulder circles 10  x    Scapular pinches 10  x             Specific Interventions Next Treatment: PT would like to work on lower cervical mechanical progression, try 1 minute VOR exercise, then test and treat BPPV maneuver as needed. Modalities as needed for pain control  Activity/Treatment Tolerance:  []  Patient tolerated treatment well  []  Patient limited by fatigue  [x]  Patient limited by pain   []  Patient limited by medical complications  []  Other:     Assessment: worked on cervical spine today with US and gentle exercises noted today. As patient is returning to MD today, will do vestibular maneuver on Thursday so patient can maintain positional precautions needed for 24 hours after BPPV maneuver. GOALS:  Patient Goal: to get this pain in neck better    Short Term Goals:  Time Frame: deferred to [facility]'s    Long Term Goals:  Time Frame: 5 week  1. Increase AROM cervical rotation B to 70, lateral flexion to 40, flexion and extension to 40 degrees to allow patient to report able to move head with decreased dizziness to 4/10 high  2.increase cervical isometric strength to fair, B UE to 3+/5 to allow patient to report able to get dressed, do dishes with decreased neck pain to 3/10  3. I with HEP as prescribed to allow decreased dizziness with [address] reduced to 30/100 with patient able to do sweeping with broom, putting away dishes with no LOB      Patient Education:    [x]  HEP/Education Completed: HEP of cervical retraction 3 x 10, backward shoulder circles, scapular retraction  []  No new Education completed  []  Reviewed Prior HEP      []  Patient verbalized and/or demonstrated understanding of education provided. []  Patient unable to verbalize and/or demonstrate understanding of education provided. Will continue education. [x]  Barriers to learning: none currently    PLAN:  Treatment Recommendations: Strengthening, Range of Motion, Pain Management, Home Exercise Program, Patient Education and Modalities    []  Plan of care initiated. Plan to see patient 2 times per week for 5 weeks to address the treatment planned outlined above.   [x]  Continue with current plan of care  []  Modify plan of care as follows:    []  Hold pending physician visit  []  Discharge    Time In 1010   Time Out 1040   Timed Code Minutes: 30 min   Total Treatment Time: 30 min       Electronically Signed by: Mason Ashley PT

## 2022-05-24 NOTE — PATIENT INSTRUCTIONS
You may receive a survey regarding the care you received during your visit. Your input is valuable to us. We encourage you to complete and return your survey. We hope you will choose us in the future for your healthcare needs. Tobacco Cessation Programs     Telephonic behavior modification  Andra Bernal ([phone])   Counseling service for those who are ready to quit using tobacco.     Available for uninsured [place] residents, [place] recipients, pregnant women, or patients whose health plans or employers are members of the [address] [place] [address] behavior modification   http://[place]. Quitlogix. org   Online support program to help patients through each step of the quitting process. Available 24 hours a day 7 days a week. Provides up to date researched based tool, step-by-step guides, and motivational messages. Online behavior modification   www.lungusa.org/stop-smoking/how-to-quit   HelpLine: [facility] (174-9097)   Email questions to:  Jesus@[employer]. Lizhi    Website offers resources to help tobacco users figure out their reasons for quitting and then take the big step of quitting for good. Hypnosis   Location: [facility] [address]   Contact: Donell Hull, PhD at [phone]   Hypnosis for tobacco cessation   Cost $225 for the initial session and $175 for each session afterwards. Most patients require 6-8 sessions. There is the option to submit through the patients insurance. Hypnosis and behavior modification   Location: [place] [address],  [facility] [address] [place] [address]   Contact: Jeff May, PhD at [phone]  [facility] Counseling and hypnosis for nicotine addition   Cost: For uninsured patients:  Please call above phone number  Cost for insured patients depends on patients insurance plan.     Behavior modification   Location: [facility], [address] [facility] [address] [place] [address], [address] [place] [address] Road: [place] [address] include four one-on-one appointments between the patient and a respiratory therapist.  The four appointments span over three weeks. The respiratory therapist schedules one of the appointments to occur 48 hours after the patients quit date.  Cost $100 total for the four sessions.   Tobacco cessation products are not included in the cost and are not provided by [facility].     Brandi

## 2022-05-26 ENCOUNTER — HOSPITAL ENCOUNTER (OUTPATIENT)
Dept: PHYSICAL THERAPY | Age: 42
Setting detail: THERAPIES SERIES
Discharge: HOME | End: 2022-05-26
Payer: MEDICARE

## 2022-05-26 PROCEDURE — 95992 CANALITH REPOSITIONING PROC: CPT

## 2022-05-26 PROCEDURE — 97110 THERAPEUTIC EXERCISES: CPT

## 2022-05-26 NOTE — PROGRESS NOTES
Time   [address] [facility]  PHYSICAL THERAPY  [] EVALUATION  [x] DAILY NOTE (LAND) [] DAILY NOTE (AQUATIC ) [] PROGRESS NOTE [] DISCHARGE NOTE    [] [facility]   [facility]     [] [facility]   [] Haven Crawford    Date: 2022  Patient Name:  Christiano Gonzalez  : 1980  MRN: [id number]  CSN: [id number]    Referring Practitioner EVA Horton -*   Diagnosis Dizziness and giddiness [R42]    Treatment Diagnosis Neck pain, dizziness   Date of Evaluation 5/10/22   Additional Pertinent History Irregular hearbeat, vertigo/dizziness, bi-polar, COD, Emphysema, anxiety. Dx with carpal tunnel problems- surgery recommended but wants to get HA/neck taken care of this 1st.       Functional Outcome Measure Used Dizziness handicap inventory   Functional Outcome Score eval score 80/100 (5/10/22)       Insurance: Primary: Payor: Cheri Ritter /  /  / ,   Secondary:    Authorization Information: Saran Hwang at 100%, 30 visits per calendar year, modalities covered except ionto/MHP/CP not covered. Visit # 4, 4/10 for progress note   Visits Allowed:    Recertification Date:    Physician Follow-Up: F/u with Dr. Guicho Webb ,  May 24  f/u with Dr Magali Keen   Physician Orders:    History of Present Illness: 30+ year history of neck pain with chiropractor since 10years old. Had been seeing Dr. Jimena Farah chiropractor since 10years old but moved to [facility]OS[facility] [address] and too far away , none x 2 years. Patient reports having HA's x 30 years as well. Patient reports 2 years ago started having severe increase in HA's with \"seizure-like\" symptoms- has lots of shaking, eye twitching, vommitting, carried to bed- these happen 2 x per month. Was every day with these severe s/s and did injections on R side . Tried several neurologists, referred to pain management.    Has done multiple injections- 3 sets of 4 injections in neck, nerve block in occipital lobe. Migraines 3 x per week with severe s/s with shaking, vommitting, eyes twitching. SUBJECTIVE:  Patient reports feeling \"fucked up\" today, had anxiety attack this morning. Dizziness with bending increased to 8.5/10  Dizziness looking up 5/10. To family MD and felt PT is helping, did advise to increase meclizine to 3 times per day  Did feel that US helped neck pain,     Did try HEP chin tucks- does 3 sets of 10- doing 2 x per day- does lessen pain for a \"little bit\" but then pain returns. Neck pain 100% decreased to 75% of the day,  High 10/10, average 6/10  HA- in occiput 100% of the day decreased to 75% of the day, high 10/10, average 8/10    Dizziness decreased from 100% to 75%of the day, high 10/10, average 8/10- decreased dizziness with looking up, still increased dizziness bending over, still increased dizziness turning head to right, decreased dizziness turning to Left. R UE to all 5 fingers numbness 100% of the day, high 10/10, average   L UE wrist to hand 50% of the day, high 7/10, average 3/10. Patient was able to follow BPPV precautions.       OBJECTIVE:    TREATMENT   Precautions: \" no heavy lifting \" per Dr. Magali Keen but no sure if this is in writing , + dizziness   Pain: Neck pain 0/10, occiput 0/10, top of head 0/10, R UE to hand tingling 4.5/10, L hand 0/10    X in shaded column indicates activity completed today   Modalities Parameters/  Location  Notes   Ultrasound 50% pulsed, 1 mHz Cervical spine, 8 minutes sitting, 1.0 intensity                 Manual Therapy Time/Technique  Notes                     Exercise/Intervention   Notes   Gentle cervical retractions minimal pressure 3 x 10 decreased neck pain 7 to 4/10      Education on cervical neutral posture and importance of posture              L ronen garcia pike test negativewith no nystagmus or increased dizziness noted  x    R ronen garcia pike + with severe dizziness 8/10 and nystagmus noted   x    Canalith repositioning maneuver CRM- (R epley)   x Dizziness decreased from 8/10 to 0/10 upon leaving   Education on 24 hours BPPV precautions, sleeping in chair tonight       Cervical  retraction 3 x 10   x    Backward shoulder circles 10  x    Scapular pinches 10  x             Specific Interventions Next Treatment: PT would like to work on lower cervical mechanical progression, try 1 minute VOR exercise, then test and treat BPPV maneuver as needed. Modalities as needed for pain control  Activity/Treatment Tolerance:  []  Patient tolerated treatment well  []  Patient limited by fatigue  [x]  Patient limited by pain   []  Patient limited by medical complications  []  Other:     Assessment: negative BPPV on L today but still significant on R, did not take meclizine today, PT advised patient to take regularly, did have panic attack this morning and forgot. Advised to try timer. GOALS:  Patient Goal: to get this pain in neck better    Short Term Goals:  Time Frame: deferred to LT's    Long Term Goals:  Time Frame: 5 week  1. Increase AROM cervical rotation B to 70, lateral flexion to 40, flexion and extension to 40 degrees to allow patient to report able to move head with decreased dizziness to 4/10 high  2.increase cervical isometric strength to fair, B UE to 3+/5 to allow patient to report able to get dressed, do dishes with decreased neck pain to 3/10  3. I with HEP as prescribed to allow decreased dizziness with [address] reduced to 30/100 with patient able to do sweeping with broom, putting away dishes with no LOB      Patient Education:    [x]  HEP/Education Completed: 24 hours BPPV precautions  []  No new Education completed  []  Reviewed Prior HEP      []  Patient verbalized and/or demonstrated understanding of education provided. []  Patient unable to verbalize and/or demonstrate understanding of education provided. Will continue education.   [x]  Barriers to learning: none currently    PLAN:  Treatment Recommendations: Strengthening, Range of Motion, Pain Management, Home Exercise Program, Patient Education and Modalities    []  Plan of care initiated. Plan to see patient 2 times per week for 5 weeks to address the treatment planned outlined above.   [x]  Continue with current plan of care  []  Modify plan of care as follows:    []  Hold pending physician visit  []  Discharge    Time In 1030   Time Out 1100   Timed Code Minutes: 30 min   Total Treatment Time: 30 min       Electronically Signed by: Madelyn Vargas PT

## 2022-05-31 ENCOUNTER — APPOINTMENT (OUTPATIENT)
Dept: PHYSICAL THERAPY | Age: 42
End: 2022-05-31
Payer: MEDICARE

## 2022-06-06 ENCOUNTER — PATIENT MESSAGE (OUTPATIENT)
Dept: FAMILY MEDICINE CLINIC | Age: 42
End: 2022-06-06

## 2022-06-06 DIAGNOSIS — R42 DIZZINESS: Primary | ICD-10-CM

## 2022-06-06 DIAGNOSIS — H69.83 ETD (EUSTACHIAN TUBE DYSFUNCTION), BILATERAL: ICD-10-CM

## 2022-06-06 DIAGNOSIS — H93.8X1 EAR PRESSURE, RIGHT: ICD-10-CM

## 2022-06-06 NOTE — TELEPHONE ENCOUNTER
From: Mario Zheng  To: Jonathan Ibarra  Sent: 6/6/2022 5:46 AM EDT  Subject: ER visit    What ever steroid they put me on in [place] helped with the inflammation in my neck which was the cause of my visit. Also is there a referral to Dr. Anai Lincoln for the ENT?

## 2022-06-16 ENCOUNTER — OFFICE VISIT (OUTPATIENT)
Dept: PHYSICAL MEDICINE AND REHAB | Age: 42
End: 2022-06-16
Payer: MEDICARE

## 2022-06-16 VITALS
WEIGHT: 140 LBS | BODY MASS INDEX: 27.48 KG/M2 | SYSTOLIC BLOOD PRESSURE: 84 MMHG | DIASTOLIC BLOOD PRESSURE: 60 MMHG | HEIGHT: 60 IN

## 2022-06-16 DIAGNOSIS — R51.9 CHRONIC NONINTRACTABLE HEADACHE, UNSPECIFIED HEADACHE TYPE: ICD-10-CM

## 2022-06-16 DIAGNOSIS — F43.9 TRAUMA AND STRESSOR-RELATED DISORDER: ICD-10-CM

## 2022-06-16 DIAGNOSIS — M54.2 PAIN OF CERVICAL FACET JOINT: ICD-10-CM

## 2022-06-16 DIAGNOSIS — G89.29 CHRONIC NONINTRACTABLE HEADACHE, UNSPECIFIED HEADACHE TYPE: ICD-10-CM

## 2022-06-16 DIAGNOSIS — M54.81 OCCIPITAL NEURALGIA OF RIGHT SIDE: ICD-10-CM

## 2022-06-16 DIAGNOSIS — G89.29 OTHER CHRONIC PAIN: ICD-10-CM

## 2022-06-16 DIAGNOSIS — G89.29 OTHER CHRONIC PAIN: Primary | ICD-10-CM

## 2022-06-16 DIAGNOSIS — J42 CHRONIC BRONCHITIS, UNSPECIFIED CHRONIC BRONCHITIS TYPE (HCC): ICD-10-CM

## 2022-06-16 DIAGNOSIS — G44.209 TENSION HEADACHE: ICD-10-CM

## 2022-06-16 PROCEDURE — G8417 CALC BMI ABV UP PARAM F/U: HCPCS | Performed by: ANESTHESIOLOGY

## 2022-06-16 PROCEDURE — 4004F PT TOBACCO SCREEN RCVD TLK: CPT | Performed by: ANESTHESIOLOGY

## 2022-06-16 PROCEDURE — G8427 DOCREV CUR MEDS BY ELIG CLIN: HCPCS | Performed by: ANESTHESIOLOGY

## 2022-06-16 PROCEDURE — 99215 OFFICE O/P EST HI 40 MIN: CPT | Performed by: ANESTHESIOLOGY

## 2022-06-16 RX ORDER — MECLIZINE HYDROCHLORIDE 25 MG/1
TABLET ORAL
COMMUNITY
Start: 2022-05-24 | End: 2022-08-24

## 2022-06-16 RX ORDER — TRAMADOL HYDROCHLORIDE 50 MG/1
50 TABLET ORAL 2 TIMES DAILY PRN
Qty: 60 TABLET | Refills: 0 | Status: SHIPPED | OUTPATIENT
Start: 2022-06-22 | End: 2022-07-22

## 2022-06-16 RX ORDER — ALBUTEROL SULFATE 90 UG/1
1-2 AEROSOL, METERED RESPIRATORY (INHALATION) EVERY 6 HOURS PRN
Qty: 18 G | Refills: 3 | Status: SHIPPED | OUTPATIENT
Start: 2022-06-16 | End: 2022-08-22 | Stop reason: SDUPTHER

## 2022-06-16 RX ORDER — TIZANIDINE 4 MG/1
4 TABLET ORAL EVERY 8 HOURS PRN
Qty: 90 TABLET | Refills: 5 | Status: SHIPPED | OUTPATIENT
Start: 2022-06-16 | End: 2022-08-22 | Stop reason: SDUPTHER

## 2022-06-16 RX ORDER — ORPHENADRINE CITRATE 100 MG/1
100 TABLET, EXTENDED RELEASE ORAL DAILY PRN
Qty: 30 TABLET | Refills: 0 | Status: SHIPPED | OUTPATIENT
Start: 2022-06-16 | End: 2022-07-16

## 2022-06-16 RX ORDER — DILTIAZEM HYDROCHLORIDE 60 MG/1
TABLET, FILM COATED ORAL
Qty: 11 G | Refills: 11 | Status: SHIPPED | OUTPATIENT
Start: 2022-06-16

## 2022-06-16 RX ORDER — GABAPENTIN 800 MG/1
800 TABLET ORAL 4 TIMES DAILY
Qty: 360 TABLET | Refills: 1 | Status: SHIPPED | OUTPATIENT
Start: 2022-06-16 | End: 2022-12-13

## 2022-06-16 RX ORDER — SCOLOPAMINE TRANSDERMAL SYSTEM 1 MG/1
1 PATCH, EXTENDED RELEASE TRANSDERMAL
Qty: 10 PATCH | Refills: 0 | Status: SHIPPED | OUTPATIENT
Start: 2022-06-16 | End: 2022-08-24

## 2022-06-16 RX ORDER — CLONAZEPAM 1 MG/1
1 TABLET ORAL NIGHTLY PRN
Qty: 30 TABLET | Refills: 2 | Status: SHIPPED | OUTPATIENT
Start: 2022-06-16 | End: 2022-08-22 | Stop reason: SDUPTHER
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
## 2022-06-17 ENCOUNTER — PATIENT MESSAGE (OUTPATIENT)
Dept: PHYSICAL MEDICINE AND REHAB | Age: 42
End: 2022-06-17

## 2022-07-08 ENCOUNTER — PATIENT MESSAGE (OUTPATIENT)
Dept: FAMILY MEDICINE CLINIC | Age: 42
End: 2022-07-08

## 2022-07-08 DIAGNOSIS — F43.9 TRAUMA AND STRESSOR-RELATED DISORDER: Primary | ICD-10-CM

## 2022-07-08 RX ORDER — PROPRANOLOL HYDROCHLORIDE 20 MG/1
20 TABLET ORAL 3 TIMES DAILY PRN
Qty: 30 TABLET | Refills: 0 | Status: SHIPPED | OUTPATIENT
Start: 2022-07-08 | End: 2022-08-24

## 2022-07-08 NOTE — TELEPHONE ENCOUNTER
From: Jose Rafael Herzog  To: Kailyn Pickup  Sent: 7/8/2022 7:43 AM EDT  Subject: Need different anxiety meds. Vybrid is causing me to get worse. Joesph clark isnt helping and I dont know what else to do.  Im confused and hurt and lost doc

## 2022-07-20 DIAGNOSIS — G89.29 CHRONIC NONINTRACTABLE HEADACHE, UNSPECIFIED HEADACHE TYPE: ICD-10-CM

## 2022-07-20 DIAGNOSIS — G89.29 OTHER CHRONIC PAIN: ICD-10-CM

## 2022-07-20 DIAGNOSIS — R51.9 CHRONIC NONINTRACTABLE HEADACHE, UNSPECIFIED HEADACHE TYPE: ICD-10-CM

## 2022-07-21 RX ORDER — GABAPENTIN 800 MG/1
800 TABLET ORAL 4 TIMES DAILY
Qty: 360 TABLET | Refills: 1 | Status: CANCELLED | OUTPATIENT
Start: 2022-07-21 | End: 2023-01-17

## 2022-07-21 NOTE — TELEPHONE ENCOUNTER
OARRS reviewed. UDS: Inconsistent Trazodoone, Marijuana, Cotinine.   + for Klonopin, Tizanidine, Tramadol. Last seen: 6/16/2022.  Follow-up:   Future Appointments   Date Time Provider Raman Ceballosi   7/22/2022  1:00 PM Ac Redman MD N ENT [facility]A [place] [address]   7/25/2022 10:00 AM [facility] [place] [facility]   7/28/2022  2:40 PM Pablo Orourke DO [facility] [place] [facility] [place]   8/23/2022  9:45 AM EVA Gross - CNP [address]

## 2022-07-22 ENCOUNTER — OFFICE VISIT (OUTPATIENT)
Dept: ENT CLINIC | Age: 42
End: 2022-07-22
Payer: COMMERCIAL

## 2022-07-22 VITALS
HEART RATE: 72 BPM | HEIGHT: 60 IN | BODY MASS INDEX: 25.72 KG/M2 | WEIGHT: 131 LBS | RESPIRATION RATE: 14 BRPM | DIASTOLIC BLOOD PRESSURE: 70 MMHG | SYSTOLIC BLOOD PRESSURE: 110 MMHG

## 2022-07-22 DIAGNOSIS — J30.2 SEASONAL ALLERGIC RHINITIS, UNSPECIFIED TRIGGER: ICD-10-CM

## 2022-07-22 DIAGNOSIS — R42 ORTHOSTATIC LIGHTHEADEDNESS: Primary | ICD-10-CM

## 2022-07-22 PROCEDURE — 4004F PT TOBACCO SCREEN RCVD TLK: CPT | Performed by: OTOLARYNGOLOGY

## 2022-07-22 PROCEDURE — 99204 OFFICE O/P NEW MOD 45 MIN: CPT | Performed by: OTOLARYNGOLOGY

## 2022-07-22 PROCEDURE — G8417 CALC BMI ABV UP PARAM F/U: HCPCS | Performed by: OTOLARYNGOLOGY

## 2022-07-22 PROCEDURE — G8427 DOCREV CUR MEDS BY ELIG CLIN: HCPCS | Performed by: OTOLARYNGOLOGY

## 2022-07-22 RX ORDER — LEVOCETIRIZINE DIHYDROCHLORIDE 5 MG/1
5 TABLET, FILM COATED ORAL NIGHTLY
Qty: 30 TABLET | Refills: 3 | Status: SHIPPED | OUTPATIENT
Start: 2022-07-22

## 2022-07-22 ASSESSMENT — ENCOUNTER SYMPTOMS
APNEA: 0
DIARRHEA: 0
CHEST TIGHTNESS: 0
CHOKING: 0
VOICE CHANGE: 0
COUGH: 0
FACIAL SWELLING: 0
SHORTNESS OF BREATH: 0
NAUSEA: 0
SORE THROAT: 0
TROUBLE SWALLOWING: 0
STRIDOR: 0
RHINORRHEA: 0
COLOR CHANGE: 0
WHEEZING: 0
ABDOMINAL PAIN: 0
SINUS PRESSURE: 0
VOMITING: 0
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
## 2022-07-22 NOTE — TELEPHONE ENCOUNTER
Spoke with pharmacy dexter [facility] and she stated patient was still using rx wrote back in January that Dr. Jung Palmer wrote. That old prescription was cancelled and they are now currently using the new prescription 6/16/22. Patient is good on Gabapentin for 6 months. Per her insurance she can only get 120 tablets dispensed at a time.

## 2022-07-22 NOTE — TELEPHONE ENCOUNTER
Can we check with pharmacy on her Neurontin? Does she have some available or refills to ? Dr Richard Gordon sent in 360 tablets for 180 days with refills on 6/16/2022. OARRS shows 6/16/2022  for 30 day supply.  I tried to call the pharmacy but they do not open until 9 am.

## 2022-07-23 LAB
ALBUMIN SERPL-MCNC: 4.2 G/DL
ALP BLD-CCNC: 74 U/L
ALT SERPL-CCNC: 25 U/L
ANION GAP SERPL CALCULATED.3IONS-SCNC: 15 MMOL/L
AST SERPL-CCNC: 23 U/L
BILIRUB SERPL-MCNC: 0.8 MG/DL (ref 0.1–1.4)
BUN BLDV-MCNC: 22 MG/DL
CALCIUM SERPL-MCNC: 9.6 MG/DL
CHLORIDE BLD-SCNC: 105 MMOL/L
CO2: 21 MMOL/L
CREAT SERPL-MCNC: 0.6 MG/DL
GFR CALCULATED: >=60
GLUCOSE BLD-MCNC: 162 MG/DL
POTASSIUM SERPL-SCNC: 4 MMOL/L
SODIUM BLD-SCNC: 137 MMOL/L
TOTAL PROTEIN: 6.8

## 2022-07-25 ENCOUNTER — TELEPHONE (OUTPATIENT)
Dept: AUDIOLOGY | Age: 42
End: 2022-07-25

## 2022-07-26 ENCOUNTER — HOSPITAL ENCOUNTER (OUTPATIENT)
Dept: INTERVENTIONAL RADIOLOGY/VASCULAR | Age: 42
Discharge: HOME | End: 2022-07-26
Payer: MEDICARE

## 2022-07-26 DIAGNOSIS — J30.2 SEASONAL ALLERGIC RHINITIS, UNSPECIFIED TRIGGER: ICD-10-CM

## 2022-07-26 DIAGNOSIS — R42 ORTHOSTATIC LIGHTHEADEDNESS: ICD-10-CM

## 2022-07-26 PROCEDURE — 93880 EXTRACRANIAL BILAT STUDY: CPT

## 2022-07-26 RX ORDER — TRAMADOL HYDROCHLORIDE 50 MG/1
50 TABLET ORAL 2 TIMES DAILY PRN
Qty: 60 TABLET | Refills: 0 | OUTPATIENT
Start: 2022-07-26 | End: 2022-08-25

## 2022-07-28 ENCOUNTER — OFFICE VISIT (OUTPATIENT)
Dept: PHYSICAL MEDICINE AND REHAB | Age: 42
End: 2022-07-28
Payer: MEDICARE

## 2022-07-28 VITALS
HEART RATE: 71 BPM | BODY MASS INDEX: 25.72 KG/M2 | HEIGHT: 60 IN | SYSTOLIC BLOOD PRESSURE: 120 MMHG | WEIGHT: 131 LBS | DIASTOLIC BLOOD PRESSURE: 70 MMHG | OXYGEN SATURATION: 97 %

## 2022-07-28 DIAGNOSIS — M47.812 SPONDYLOSIS OF CERVICAL REGION WITHOUT MYELOPATHY OR RADICULOPATHY: Primary | ICD-10-CM

## 2022-07-28 DIAGNOSIS — M79.18 MYOFASCIAL PAIN: ICD-10-CM

## 2022-07-28 DIAGNOSIS — G44.86 CERVICOGENIC HEADACHE: ICD-10-CM

## 2022-07-28 DIAGNOSIS — G89.29 OTHER CHRONIC PAIN: ICD-10-CM

## 2022-07-28 PROCEDURE — G8417 CALC BMI ABV UP PARAM F/U: HCPCS | Performed by: ANESTHESIOLOGY

## 2022-07-28 PROCEDURE — 4004F PT TOBACCO SCREEN RCVD TLK: CPT | Performed by: ANESTHESIOLOGY

## 2022-07-28 PROCEDURE — G8427 DOCREV CUR MEDS BY ELIG CLIN: HCPCS | Performed by: ANESTHESIOLOGY

## 2022-07-28 PROCEDURE — 99214 OFFICE O/P EST MOD 30 MIN: CPT | Performed by: ANESTHESIOLOGY
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
## 2022-08-03 ENCOUNTER — PATIENT MESSAGE (OUTPATIENT)
Dept: FAMILY MEDICINE CLINIC | Age: 42
End: 2022-08-03

## 2022-08-03 NOTE — TELEPHONE ENCOUNTER
From: Ann Lyons  To: Joana Samaniego  Sent: 8/3/2022 2:15 PM EDT  Subject: Labs     I was at the hospital for a kidney stone they ran labs I was just wondering if those came to you and if they are the same labs you requested.

## 2022-08-05 ENCOUNTER — HOSPITAL ENCOUNTER (OUTPATIENT)
Dept: AUDIOLOGY | Age: 42
Discharge: HOME | End: 2022-08-05
Payer: MEDICARE

## 2022-08-05 PROCEDURE — 92557 COMPREHENSIVE HEARING TEST: CPT | Performed by: AUDIOLOGIST

## 2022-08-05 PROCEDURE — 92540 BASIC VESTIBULAR EVALUATION: CPT | Performed by: AUDIOLOGIST

## 2022-08-05 PROCEDURE — 92567 TYMPANOMETRY: CPT | Performed by: AUDIOLOGIST

## 2022-08-05 PROCEDURE — 92537 CALORIC VSTBLR TEST W/REC: CPT | Performed by: AUDIOLOGIST

## 2022-08-16 DIAGNOSIS — R94.118 ABNORMAL VIDEONYSTAGMOGRAM (VNG): ICD-10-CM

## 2022-08-16 DIAGNOSIS — H93.291 IMPAIRED AUDITORY DISCRIMINATION, RIGHT: ICD-10-CM

## 2022-08-16 DIAGNOSIS — H90.3 ASYMMETRICAL SENSORINEURAL HEARING LOSS: Primary | ICD-10-CM

## 2022-08-16 LAB
ALBUMIN SERPL-MCNC: 4 G/DL (ref 3.5–5)
ALBUMIN/GLOBULIN RATIO: 1.6 (ref 1.2–1.5)
ALK PHOSPHATASE: 64 U/L (ref 38–126)
ALT SERPL-CCNC: 19 U/L (ref 7–35)
AST SERPL-CCNC: 17 U/L (ref 10–42)
BILIRUB SERPL-MCNC: 0.3 MG/DL (ref 0.3–1.2)
BUN BLDV-MCNC: 15 MG/DL (ref 7–22)
CALCIUM SERPL-MCNC: 9.3 MG/DL (ref 8.4–10.2)
CHLORIDE BLD-SCNC: 102 MEQ/L (ref 98–107)
CHOLESTEROL: 190 MG/DL (ref 0–200)
CO2: 31 MEQ/L (ref 22–31)
CREAT SERPL-MCNC: 0.8 MG/DL (ref 0.4–1.1)
GFR SERPL CREATININE-BSD FRML MDRD: >=60 ML/MIN/{1.73_M2}
GLOBULIN: 2.5 G/DL (ref 2.9–3.3)
GLUCOSE: 96 MG/DL (ref 70–126)
HCT VFR BLD CALC: 38.1 % (ref 37–47)
HDLC SERPL-MCNC: 39 MG/DL (ref 40–60)
HEMOGLOBIN: 13 G/DL (ref 12–16)
LDL CHOLESTEROL: 132 MG/DL (ref 0–100)
MCH RBC QN AUTO: 30.8 PG (ref 28–32)
MCHC RBC AUTO-ENTMCNC: 34.1 G/DL (ref 33–37)
MCV RBC AUTO: 90.4 FL (ref 81–99)
NON-HDL CHOLESTEROL: 151 MG/DL (ref 0–130)
PDW BLD-RTO: 14.1 % (ref 11.5–14.5)
PLATELET # BLD: 170 THOU/CUMM (ref 130–400)
POTASSIUM SERPL-SCNC: 4.2 MEQ/L (ref 3.5–5.1)
RBC: 4.21 MIL/CUMM (ref 4.2–5.4)
SODIUM BLD-SCNC: 139 MEQ/L (ref 136–145)
TOTAL PROTEIN: 6.5 G/DL (ref 6.4–8.3)
TRIGL SERPL-MCNC: 92 MG/DL (ref 40–150)
WBC: 9.6 THOU/CUMM (ref 4.8–10.8)

## 2022-08-16 NOTE — PROGRESS NOTES
Audio and VNG report shows an asymmetrical sensory loss significantly worse on the right than the left. There is a right-sided caloric weakness of 27% as well. Auditory discrimination is slightly worse on the right than the left. This is a clear indication for MRI of the IACs.   We will contact the patient and schedule this, and to be followed by an office visit

## 2022-08-17 ENCOUNTER — PREP FOR PROCEDURE (OUTPATIENT)
Dept: PHYSICAL MEDICINE AND REHAB | Age: 42
End: 2022-08-17

## 2022-08-17 LAB
AVERAGE GLUCOSE: 105 MG/DL (ref 70–126)
HBA1C MFR BLD: 5.5 % (ref 4.4–6.4)
TSH REFLEX FT4: 0.99 UIU/ML (ref 0.49–4.67)

## 2022-08-22 DIAGNOSIS — F43.9 TRAUMA AND STRESSOR-RELATED DISORDER: ICD-10-CM

## 2022-08-22 DIAGNOSIS — J42 CHRONIC BRONCHITIS, UNSPECIFIED CHRONIC BRONCHITIS TYPE (HCC): ICD-10-CM

## 2022-08-22 DIAGNOSIS — G44.209 TENSION HEADACHE: ICD-10-CM

## 2022-08-22 RX ORDER — CLONAZEPAM 1 MG/1
1 TABLET ORAL NIGHTLY PRN
Qty: 30 TABLET | Refills: 0 | Status: SHIPPED | OUTPATIENT
Start: 2022-08-22 | End: 2022-09-21

## 2022-08-22 RX ORDER — ALBUTEROL SULFATE 90 UG/1
1-2 AEROSOL, METERED RESPIRATORY (INHALATION) EVERY 6 HOURS PRN
Qty: 18 G | Refills: 3 | Status: SHIPPED | OUTPATIENT
Start: 2022-08-22

## 2022-08-22 RX ORDER — TIZANIDINE 4 MG/1
4 TABLET ORAL EVERY 8 HOURS PRN
Qty: 90 TABLET | Refills: 5 | Status: SHIPPED | OUTPATIENT
Start: 2022-08-22

## 2022-08-22 RX ORDER — KETOROLAC TROMETHAMINE 10 MG/1
10 TABLET, FILM COATED ORAL EVERY 8 HOURS PRN
Qty: 20 TABLET | Refills: 0 | Status: SHIPPED | OUTPATIENT
Start: 2022-08-22 | End: 2022-08-24

## 2022-08-22 NOTE — DISCHARGE INSTRUCTIONS
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
## 2022-08-23 ENCOUNTER — HOSPITAL ENCOUNTER (OUTPATIENT)
Age: 42
Setting detail: OUTPATIENT SURGERY
Discharge: HOME | End: 2022-08-23
Attending: ANESTHESIOLOGY | Admitting: ANESTHESIOLOGY
Payer: MEDICARE

## 2022-08-23 ENCOUNTER — APPOINTMENT (OUTPATIENT)
Dept: GENERAL RADIOLOGY | Age: 42
End: 2022-08-23
Attending: ANESTHESIOLOGY
Payer: MEDICARE

## 2022-08-23 VITALS
DIASTOLIC BLOOD PRESSURE: 75 MMHG | BODY MASS INDEX: 27.92 KG/M2 | HEIGHT: 60 IN | HEART RATE: 72 BPM | TEMPERATURE: 96.3 F | WEIGHT: 142.2 LBS | RESPIRATION RATE: 16 BRPM | SYSTOLIC BLOOD PRESSURE: 100 MMHG | OXYGEN SATURATION: 94 %

## 2022-08-23 PROCEDURE — 99152 MOD SED SAME PHYS/QHP 5/>YRS: CPT | Performed by: ANESTHESIOLOGY

## 2022-08-23 PROCEDURE — 64633 DESTROY CERV/THOR FACET JNT: CPT | Performed by: ANESTHESIOLOGY

## 2022-08-23 PROCEDURE — 64634 DESTROY C/TH FACET JNT ADDL: CPT | Performed by: ANESTHESIOLOGY

## 2022-08-23 PROCEDURE — 7100000011 HC PHASE II RECOVERY - ADDTL 15 MIN: Performed by: ANESTHESIOLOGY

## 2022-08-23 PROCEDURE — 3209999900 FLUORO FOR SURGICAL PROCEDURES

## 2022-08-23 PROCEDURE — 2500000003 HC RX 250 WO HCPCS: Performed by: ANESTHESIOLOGY

## 2022-08-23 PROCEDURE — 2709999900 HC NON-CHARGEABLE SUPPLY: Performed by: ANESTHESIOLOGY

## 2022-08-23 PROCEDURE — 7100000010 HC PHASE II RECOVERY - FIRST 15 MIN: Performed by: ANESTHESIOLOGY

## 2022-08-23 PROCEDURE — 3600000056 HC PAIN LEVEL 4 BASE: Performed by: ANESTHESIOLOGY

## 2022-08-23 PROCEDURE — 3600000057 HC PAIN LEVEL 4 ADDL 15 MIN: Performed by: ANESTHESIOLOGY

## 2022-08-23 PROCEDURE — 6360000002 HC RX W HCPCS: Performed by: ANESTHESIOLOGY

## 2022-08-23 RX ORDER — LIDOCAINE HYDROCHLORIDE 10 MG/ML
INJECTION, SOLUTION EPIDURAL; INFILTRATION; INTRACAUDAL; PERINEURAL PRN
Status: DISCONTINUED | OUTPATIENT
Start: 2022-08-23 | End: 2022-08-23 | Stop reason: ALTCHOICE

## 2022-08-23 RX ORDER — LIDOCAINE HYDROCHLORIDE 20 MG/ML
INJECTION, SOLUTION INFILTRATION; PERINEURAL PRN
Status: DISCONTINUED | OUTPATIENT
Start: 2022-08-23 | End: 2022-08-23 | Stop reason: ALTCHOICE

## 2022-08-23 RX ORDER — MIDAZOLAM HYDROCHLORIDE 1 MG/ML
INJECTION INTRAMUSCULAR; INTRAVENOUS PRN
Status: DISCONTINUED | OUTPATIENT
Start: 2022-08-23 | End: 2022-08-23 | Stop reason: ALTCHOICE

## 2022-08-23 RX ORDER — FENTANYL CITRATE 50 UG/ML
INJECTION, SOLUTION INTRAMUSCULAR; INTRAVENOUS PRN
Status: DISCONTINUED | OUTPATIENT
Start: 2022-08-23 | End: 2022-08-23 | Stop reason: ALTCHOICE

## 2022-08-23 ASSESSMENT — PAIN SCALES - GENERAL: PAINLEVEL_OUTOF10: 6

## 2022-08-23 ASSESSMENT — PAIN DESCRIPTION - LOCATION: LOCATION: NECK

## 2022-08-23 NOTE — PRE SEDATION
[facility]  Pre-Sedation/Analgesia History & Physical    Pt Name: Andrew Duarte  MRN: [id number]  YOB: 1980  Provider Performing Procedure: Owens Fleischer, DO   Primary Care Physician: EVA Waldron CNP      MEDICAL HISTORY       has a past medical history of Anxiety, Asthma, COPD (chronic obstructive pulmonary disease) ([address] Depression, and Migraines. SURGICAL HISTORY   has a past surgical history that includes Tonsillectomy and adenoidectomy (1989); Ovary removal (2000); Cholecystectomy (2003); Tubal ligation (2005); Hysterectomy (2011); Dilation and curettage of uterus (2009); IGS Endo Sinus Sx (07/12/2017); Tooth Extraction; Facet joint injection (Right, 08/10/2021); Facet joint injection (Right, 09/21/2021); and Pain management procedure (Right, 11/09/2021). ALLERGIES   Allergies as of 07/28/2022 - Fully Reviewed 07/22/2022   Allergen Reaction Noted    Baclofen  01/07/2022    Botox [onabotulinumtoxina (cosmetic)]  02/20/2019    Other  02/20/2019    Topamax [topiramate]  02/20/2019    Acetaminophen Nausea And Vomiting 10/19/2017    Aspirin Nausea And Vomiting 03/09/2015    Ibuprofen Nausea And Vomiting 03/09/2015       MEDICATIONS   Prior to Admission medications    Medication Sig Start Date End Date Taking? Authorizing Provider   clonazePAM (KLONOPIN) 1 MG tablet Take 1 tablet by mouth nightly as needed for Anxiety for up to 30 days.  8/22/22 9/21/22  EVA Waldron CNP   albuterol sulfate HFA (VENTOLIN HFA) 108 (90 Base) MCG/ACT inhaler Inhale 1-2 puffs into the lungs every 6 hours as needed for Wheezing 8/22/22   EVA Waldron CNP   tiZANidine (ZANAFLEX) 4 MG tablet Take 1 tablet by mouth every 8 hours as needed (neck pain) 8/22/22   EVA Waldron CNP   ketorolac (TORADOL) 10 MG tablet Take 1 tablet by mouth every 8 hours as needed for Pain 8/22/22   EVA Waldron CNP   levocetirizine (XYZAL) 5 MG tablet Take 1 tablet by mouth nightly May take it any time of the day. Patient choice. 7/22/22   Felisha Crespo MD   propranolol (INDERAL) 20 MG tablet Take 1 tablet by mouth 3 times daily as needed (anxiety) 7/8/22   EVA Keating CNP   meclizine (ANTIVERT) 25 MG tablet TAKE 1 TO 2 TABLETS BY MOUTH THREE TIMES DAILY AS NEEDED FOR DIZZINESS 5/24/22   Historical Provider, MD   SYMBICORT 80-4.5 MCG/ACT AERO INHALE 2 PUFFS BY MOUTH TWICE DAILY, RINSE MOUTH AFTER USE 6/16/22   EVA Keating CNP   gabapentin (NEURONTIN) 800 MG tablet Take 1 tablet by mouth 4 times daily for 180 days. 6/16/22 12/13/22  Pablo Ge DO   scopolamine (TRANSDERM-SCOP, 1.5 MG,) transdermal patch Place 1 patch onto the skin every 72 hours 6/16/22 6/16/23  Pablo Ge DO   ketorolac (TORADOL) 10 MG tablet Take 1 tablet by mouth 2 times daily as needed 5/21/22   Historical Provider, MD   saline nasal gel (AYR) GEL 1 spray by Nasal route 2 times daily as needed 5/21/22   Historical Provider, MD   vilazodone HCl (VILAZODONE HCL) 40 MG TABS Take 1 tablet by mouth once daily 4/21/22   EVA Keating CNP   Respiratory Therapy Supplies (NEBULIZER COMPRESSOR) KIT 1 kit by Does not apply route once for 1 dose 1/9/22 1/9/22  EVA Keating CNP   albuterol (PROVENTIL) (2.5 MG/3ML) 0.083% nebulizer solution Take 3 mLs by nebulization every 6 hours as needed for Wheezing 1/9/22   EVA Keating CNP   Multiple Vitamins-Calcium (ONE-A-DAY WOMENS PO) Take by mouth daily     Historical Provider, MD     PHYSICAL:   Vitals:    08/23/22 0948   BP: 100/75   Pulse: 72   Resp: 16   Temp: (!) 96.3 °F (35.7 °C)   SpO2: 94%     PLANNED PROCEDURE   See procedure note  SEDATION  Planned agent: Versed and Fentanyl  ASA Classification: 1  Class 1: A normal healthy patient  Class 2: Pt with mild to moderate systemic disease  Class 3: Severe systemic disease or disturbance  Class 4: Severe systemic disorders that are already life threatening.   Class 5: Moribund pt with little chances of survival, for more than 24 hours. Mallampati I Airway Classification: 1    1. Pre-procedure diagnostic studies complete and results available. 2. Previous sedation/anesthesia experiences assessed. 3. The patient is an appropriate candidate to undergo the planned procedure sedation and anesthesia. (Refer to nursing sedation/analgesia documentation record)  4. Formulation and discussion of sedation/procedure plan, risks, and expectations with patient and/or responsible adult completed. 5. Patient examined immediately prior to the procedure.  (Refer to nursing sedation/analgesia documentation record)    Mildred Tran DO  Electronically signed 8/23/2022 at 12:38 PM

## 2022-08-23 NOTE — PROCEDURES
Pre-operative Diagnosis: Cervical facet pain     Post-operative Diagnosis: Cervical facet pain     Procedure: RIGHT cervical thermal radiofrequency ablation targeting C2/C3, C3/C4, and C4/C5     Procedure Description:  After consent was obtained, the patient was placed in the prone position with pressure points appropriately padded. The neck was prepped with Chloraprep and draped in the usual sterile fashion. 0.5 mL of 1% lidocaine was used for local anesthesia of the skin and superficial subcutaneous tissues. 22-gauge 100 mm SMK cannula with a 5 mm active tip was advanced under fluoroscopy in the PA view to reach to the waist of the lateral pillar(s) of the respective vertebral body of C2/C3, C3/C4, and C4/C5. The cannula was then further advanced in lateral view to reach the middle of the trapezoid body of the lateral pillar(s) of these vertebrae. After confirming the position of the needle with fluoroscopy, aspiration was performed and was negative for heme or CSF fluid. There were no paresthesias. Sensory and motor stimulation at 50 Hz and 2 Hz, respectively, as well as impedance measurements were carried out having reached threshold on:     RIGHT  C2/C3: 0.2V/3V/150-300 Ohms  C3/C4: 0.2V/3V/150-300 Ohms  C4/C5: 0.2V/3V/150-300 Ohms     Then, 1 mL of 2% Lidocaine was injected at each site. Temperature was then raised to 80 degrees centigrade for 90 seconds with a 15 second temperature ramp. No pain was reported during the lesioning. The needles were then withdrawn without complications. The patient tolerated the procedure well and discharged in ambulatory fashion.         Procedural Complications: None  Estimated Blood Loss: 0 mL        IV sedation was used during the procedure:  - Moderate intravenous conscious sedation was supervised by Dr. Wilfredo Bolanos  - The patient was independently monitored by a Registered Nurse assigned to the procedure room  - Monitoring included automated blood pressure, continuous EKG, and continuous pulse oximetry  - The detailed conscious record is permanently stored in the [place] [address]  - The following is the conscious sedation record:  Start Time: 09:36  End Time : 09:51  Duration: 15 minutes   Medications Administered: 2 mg Versed, 100 mcg Fentanyl         Pablo Ge DO  Interventional Pain Management/PM&R   [place]

## 2022-08-23 NOTE — POST SEDATION
[address] [address]  Sedation/Analgesia Post Sedation Record    Pt Name: Troy Vargas  MRN: [id number]  YOB: 1980  Procedure Performed By: Lanre Ray DO  Primary Care Physician: EVA Stanton - BACILIO    POST-PROCEDURE    Physicians/Assistants: Lanre Ray DO  Procedure Performed: See Procedure Note   Sedation/Anesthesia: Versed and Fentanyl (See procedure note for amount and duration)  Estimated Blood Loss:     0  ml  Specimens Removed: None        Complications: None           Pablo Doan DO  Electronically signed 8/23/2022 at 12:39 PM

## 2022-08-23 NOTE — PROGRESS NOTES
3645 TO recovery via cart. Spont resp. VSS. Report received from surgical rn. Pt denies pain numbness tingling or nausea. Snack and drink given. Call bell in reach. 1000 IV discontinued.  Up to dress self  1005 Discharge to home in [facility] ambulatory condition with vandana

## 2022-08-24 ENCOUNTER — OFFICE VISIT (OUTPATIENT)
Dept: FAMILY MEDICINE CLINIC | Age: 42
End: 2022-08-24
Payer: MEDICARE

## 2022-08-24 VITALS
RESPIRATION RATE: 15 BRPM | HEART RATE: 66 BPM | DIASTOLIC BLOOD PRESSURE: 60 MMHG | WEIGHT: 144.4 LBS | SYSTOLIC BLOOD PRESSURE: 118 MMHG | OXYGEN SATURATION: 97 % | BODY MASS INDEX: 28.2 KG/M2

## 2022-08-24 DIAGNOSIS — F43.9 TRAUMA AND STRESSOR-RELATED DISORDER: Primary | ICD-10-CM

## 2022-08-24 DIAGNOSIS — H69.83 ETD (EUSTACHIAN TUBE DYSFUNCTION), BILATERAL: ICD-10-CM

## 2022-08-24 DIAGNOSIS — G44.209 TENSION HEADACHE: ICD-10-CM

## 2022-08-24 DIAGNOSIS — M79.18 CERVICAL MYOFASCIAL PAIN SYNDROME: ICD-10-CM

## 2022-08-24 DIAGNOSIS — G44.86 CERVICOGENIC HEADACHE: ICD-10-CM

## 2022-08-24 DIAGNOSIS — M54.81 BILATERAL OCCIPITAL NEURALGIA: ICD-10-CM

## 2022-08-24 DIAGNOSIS — R42 DIZZINESS: ICD-10-CM

## 2022-08-24 DIAGNOSIS — J42 CHRONIC BRONCHITIS, UNSPECIFIED CHRONIC BRONCHITIS TYPE (HCC): ICD-10-CM

## 2022-08-24 DIAGNOSIS — F17.200 SMOKING: ICD-10-CM

## 2022-08-24 DIAGNOSIS — F33.2 SEVERE EPISODE OF RECURRENT MAJOR DEPRESSIVE DISORDER, WITHOUT PSYCHOTIC FEATURES (HCC): ICD-10-CM

## 2022-08-24 PROCEDURE — 99214 OFFICE O/P EST MOD 30 MIN: CPT | Performed by: NURSE PRACTITIONER

## 2022-08-24 PROCEDURE — G8417 CALC BMI ABV UP PARAM F/U: HCPCS | Performed by: NURSE PRACTITIONER

## 2022-08-24 PROCEDURE — G8427 DOCREV CUR MEDS BY ELIG CLIN: HCPCS | Performed by: NURSE PRACTITIONER

## 2022-08-24 PROCEDURE — 3023F SPIROM DOC REV: CPT | Performed by: NURSE PRACTITIONER

## 2022-08-24 PROCEDURE — 4004F PT TOBACCO SCREEN RCVD TLK: CPT | Performed by: NURSE PRACTITIONER

## 2022-08-24 RX ORDER — IMIPRAMINE HCL 25 MG
25 TABLET ORAL 3 TIMES DAILY
Qty: 90 TABLET | Refills: 0 | Status: SHIPPED | OUTPATIENT
Start: 2022-08-24 | End: 2022-09-17 | Stop reason: SDUPTHER

## 2022-08-24 SDOH — ECONOMIC STABILITY: FOOD INSECURITY: WITHIN THE PAST 12 MONTHS, YOU WORRIED THAT YOUR FOOD WOULD RUN OUT BEFORE YOU GOT MONEY TO BUY MORE.: NEVER TRUE

## 2022-08-24 SDOH — ECONOMIC STABILITY: FOOD INSECURITY: WITHIN THE PAST 12 MONTHS, THE FOOD YOU BOUGHT JUST DIDN'T LAST AND YOU DIDN'T HAVE MONEY TO GET MORE.: NEVER TRUE

## 2022-08-24 ASSESSMENT — ENCOUNTER SYMPTOMS
COUGH: 0
ABDOMINAL PAIN: 0
NAUSEA: 0
SHORTNESS OF BREATH: 0
BACK PAIN: 1

## 2022-08-24 ASSESSMENT — SOCIAL DETERMINANTS OF HEALTH (SDOH): HOW HARD IS IT FOR YOU TO PAY FOR THE VERY BASICS LIKE FOOD, HOUSING, MEDICAL CARE, AND HEATING?: NOT HARD AT ALL
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
## 2022-09-17 ENCOUNTER — PATIENT MESSAGE (OUTPATIENT)
Dept: FAMILY MEDICINE CLINIC | Age: 42
End: 2022-09-17

## 2022-09-17 DIAGNOSIS — F33.2 SEVERE EPISODE OF RECURRENT MAJOR DEPRESSIVE DISORDER, WITHOUT PSYCHOTIC FEATURES (HCC): ICD-10-CM

## 2022-09-17 DIAGNOSIS — F43.9 TRAUMA AND STRESSOR-RELATED DISORDER: ICD-10-CM

## 2022-09-19 RX ORDER — IMIPRAMINE HCL 25 MG
25 TABLET ORAL 3 TIMES DAILY
Qty: 90 TABLET | Refills: 5 | Status: SHIPPED | OUTPATIENT
Start: 2022-09-19 | End: 2022-11-17 | Stop reason: SDUPTHER

## 2022-09-19 RX ORDER — VILAZODONE HYDROCHLORIDE 40 MG/1
TABLET ORAL
Qty: 30 TABLET | Refills: 5 | Status: SHIPPED | OUTPATIENT
Start: 2022-09-19

## 2022-09-19 RX ORDER — KETOROLAC TROMETHAMINE 10 MG/1
10 TABLET, FILM COATED ORAL EVERY 8 HOURS PRN
Qty: 20 TABLET | Refills: 0 | Status: SHIPPED | OUTPATIENT
Start: 2022-09-19 | End: 2022-10-27 | Stop reason: SDUPTHER

## 2022-09-19 NOTE — TELEPHONE ENCOUNTER
From: Nayan Goodrich  To: Mauri Mohs  Sent: 9/17/2022 6:35 PM EDT  Subject: Prescription     Can I get my toradol please.  My migraines hurt

## 2022-09-22 ENCOUNTER — HOSPITAL ENCOUNTER (OUTPATIENT)
Dept: MRI IMAGING | Age: 42
Discharge: HOME | End: 2022-09-22
Payer: MEDICARE

## 2022-09-22 DIAGNOSIS — R94.118 ABNORMAL VIDEONYSTAGMOGRAM (VNG): ICD-10-CM

## 2022-09-22 DIAGNOSIS — H90.3 ASYMMETRICAL SENSORINEURAL HEARING LOSS: ICD-10-CM

## 2022-09-22 DIAGNOSIS — H93.291 IMPAIRED AUDITORY DISCRIMINATION, RIGHT: ICD-10-CM

## 2022-09-22 PROCEDURE — 6360000004 HC RX CONTRAST MEDICATION: Performed by: OTOLARYNGOLOGY

## 2022-09-22 PROCEDURE — 70553 MRI BRAIN STEM W/O & W/DYE: CPT

## 2022-09-22 PROCEDURE — A9579 GAD-BASE MR CONTRAST NOS,1ML: HCPCS | Performed by: OTOLARYNGOLOGY

## 2022-09-22 RX ADMIN — GADOTERIDOL 10 ML: 279.3 INJECTION, SOLUTION INTRAVENOUS at 12:10

## 2022-10-18 ENCOUNTER — PATIENT MESSAGE (OUTPATIENT)
Dept: FAMILY MEDICINE CLINIC | Age: 42
End: 2022-10-18

## 2022-10-18 DIAGNOSIS — K04.7 DENTAL INFECTION: Primary | ICD-10-CM

## 2022-10-18 RX ORDER — AMOXICILLIN 875 MG/1
875 TABLET, COATED ORAL 2 TIMES DAILY
Qty: 20 TABLET | Refills: 0 | Status: SHIPPED | OUTPATIENT
Start: 2022-10-18 | End: 2022-10-28

## 2022-10-18 NOTE — TELEPHONE ENCOUNTER
From: Arianne Bobo  To: Deondre Chaudhary  Sent: 10/18/2022 2:00 AM EDT  Subject: Tooth infection     I got one removed but I have to wait until December to get this other one removed because hes booked. Please help.  Can I get some amoxicillin

## 2022-10-27 RX ORDER — KETOROLAC TROMETHAMINE 10 MG/1
10 TABLET, FILM COATED ORAL EVERY 8 HOURS PRN
Qty: 20 TABLET | Refills: 0 | Status: SHIPPED | OUTPATIENT
Start: 2022-10-27 | End: 2022-11-17 | Stop reason: SDUPTHER

## 2022-11-09 ENCOUNTER — TELEPHONE (OUTPATIENT)
Dept: ENT CLINIC | Age: 42
End: 2022-11-09

## 2022-11-09 NOTE — TELEPHONE ENCOUNTER
Called patient to get her R/S from Dr Karely Mesa cancelling office on 10/28/22. Patient was to be seen for MRI results. Scheduled patient for 12/13/22 @ 1:15 pm. Offered patient a sooner appointment but it was early in the morning and patient states she lives an hour away from us so that time was too early for her. Can her MRI be reviewed to verify she will be ok out that far for her appointment?

## 2022-11-09 NOTE — TELEPHONE ENCOUNTER
Called patient and let her know she is going to keep appointment she has and will call us if she needs anything else

## 2022-11-17 DIAGNOSIS — F43.9 TRAUMA AND STRESSOR-RELATED DISORDER: ICD-10-CM

## 2022-11-18 RX ORDER — CLONAZEPAM 1 MG/1
1 TABLET ORAL NIGHTLY PRN
Qty: 30 TABLET | Refills: 0 | Status: SHIPPED | OUTPATIENT
Start: 2022-11-18 | End: 2022-12-15

## 2022-11-18 RX ORDER — KETOROLAC TROMETHAMINE 10 MG/1
10 TABLET, FILM COATED ORAL EVERY 8 HOURS PRN
Qty: 20 TABLET | Refills: 0 | Status: SHIPPED | OUTPATIENT
Start: 2022-11-18 | End: 2022-12-22 | Stop reason: SDUPTHER

## 2022-11-18 RX ORDER — IMIPRAMINE HCL 25 MG
25 TABLET ORAL 3 TIMES DAILY
Qty: 90 TABLET | Refills: 5 | Status: SHIPPED | OUTPATIENT
Start: 2022-11-18

## 2022-12-13 ENCOUNTER — OFFICE VISIT (OUTPATIENT)
Dept: ENT CLINIC | Age: 42
End: 2022-12-13
Payer: MEDICARE

## 2022-12-13 VITALS
SYSTOLIC BLOOD PRESSURE: 110 MMHG | HEART RATE: 68 BPM | OXYGEN SATURATION: 97 % | BODY MASS INDEX: 28.11 KG/M2 | DIASTOLIC BLOOD PRESSURE: 70 MMHG | WEIGHT: 143.2 LBS | TEMPERATURE: 97.4 F | HEIGHT: 60 IN

## 2022-12-13 DIAGNOSIS — H93.291 IMPAIRED AUDITORY DISCRIMINATION, RIGHT: ICD-10-CM

## 2022-12-13 DIAGNOSIS — H90.3 ASYMMETRICAL SENSORINEURAL HEARING LOSS: Primary | ICD-10-CM

## 2022-12-13 PROCEDURE — G8427 DOCREV CUR MEDS BY ELIG CLIN: HCPCS | Performed by: OTOLARYNGOLOGY

## 2022-12-13 PROCEDURE — G8484 FLU IMMUNIZE NO ADMIN: HCPCS | Performed by: OTOLARYNGOLOGY

## 2022-12-13 PROCEDURE — 99213 OFFICE O/P EST LOW 20 MIN: CPT | Performed by: OTOLARYNGOLOGY

## 2022-12-13 PROCEDURE — G8417 CALC BMI ABV UP PARAM F/U: HCPCS | Performed by: OTOLARYNGOLOGY

## 2022-12-13 PROCEDURE — 4004F PT TOBACCO SCREEN RCVD TLK: CPT | Performed by: OTOLARYNGOLOGY

## 2022-12-13 ASSESSMENT — ENCOUNTER SYMPTOMS
VOICE CHANGE: 0
VOMITING: 0
APNEA: 0
ABDOMINAL PAIN: 0
RHINORRHEA: 0
SINUS PRESSURE: 0
NAUSEA: 0
SHORTNESS OF BREATH: 0
CHEST TIGHTNESS: 0
FACIAL SWELLING: 0
TROUBLE SWALLOWING: 0
SORE THROAT: 0
DIARRHEA: 0
WHEEZING: 0
COLOR CHANGE: 0
CHOKING: 0
STRIDOR: 0
COUGH: 0

## 2022-12-13 NOTE — PROGRESS NOTES
[facility] [place] [facility]  [facility] EAR, NOSE AND THROAT  [facility]  Dept: [phone]  Dept Fax: [phone]  Loc: [phone]    Alexandra Alba is a 40 y.o. female who was referred byNo ref. provider found for:  Chief Complaint   Patient presents with    Follow-up     Patient is here for a follow up after MRI    . HPI:     Alexandra Alba is a 40 y.o. female who presents today for follow-up on herINFORMATIONAsymmetrical sensorineural hearing loss, Abnormal videonystagmogram (VNG), Impaired auditory discrimination, right. MRI did not show any abnormalities that could be causing her problem specifically no retrocochlear lesion. History: Allergies   Allergen Reactions    Baclofen      Causes muscle spams    Botox [Onabotulinumtoxina (Cosmetic)]      Swelling 2 hours after injection. Other      Injections through [facility]    Topamax [Topiramate]      Chest Pains and lose of feeling in extremities    Acetaminophen Nausea And Vomiting     \"vomit blood\"    Aspirin Nausea And Vomiting     Pt has gastric ulcers    Ibuprofen Nausea And Vomiting     Pt has gastric ulcers     Current Outpatient Medications   Medication Sig Dispense Refill    clonazePAM (KLONOPIN) 1 MG tablet Take 1 tablet by mouth nightly as needed for Anxiety for up to 30 days.  30 tablet 0    imipramine (TOFRANIL) 25 MG tablet Take 1 tablet by mouth in the morning, at noon, and at bedtime 90 tablet 5    ketorolac (TORADOL) 10 MG tablet Take 1 tablet by mouth every 8 hours as needed for Pain 20 tablet 0    vilazodone hcl 40 MG TABS Take 1 tablet by mouth once daily 30 tablet 5    albuterol sulfate HFA (VENTOLIN HFA) 108 (90 Base) MCG/ACT inhaler Inhale 1-2 puffs into the lungs every 6 hours as needed for Wheezing 18 g 3    tiZANidine (ZANAFLEX) 4 MG tablet Take 1 tablet by mouth every 8 hours as needed (neck pain) 90 tablet 5    levocetirizine (XYZAL) 5 MG tablet Take 1 tablet by mouth nightly May take it any time of the day. Patient choice. 30 tablet 3    SYMBICORT 80-4.5 MCG/ACT AERO INHALE 2 PUFFS BY MOUTH TWICE DAILY, RINSE MOUTH AFTER USE 11 g 11    gabapentin (NEURONTIN) 800 MG tablet Take 1 tablet by mouth 4 times daily for 180 days. 360 tablet 1    albuterol (PROVENTIL) (2.5 MG/3ML) 0.083% nebulizer solution Take 3 mLs by nebulization every 6 hours as needed for Wheezing 120 each 3    Multiple Vitamins-Calcium (ONE-A-DAY WOMENS PO) Take by mouth daily        No current facility-administered medications for this visit. Past Medical History:   Diagnosis Date    Anxiety     Asthma     COPD (chronic obstructive pulmonary disease) ([facility] [address]     Depression     Migraines       Past Surgical History:   Procedure Laterality Date    CARPAL TUNNEL RELEASE Right 10/2022    CHOLECYSTECTOMY  2003    DILATION AND CURETTAGE OF UTERUS  2009    FACET JOINT INJECTION Right 08/10/2021    medial branch blocks  RIGHT C2/C3 (TON), C3/C4, and C4/C5 was chosen performed by Rangel Baires DO at [address] [facility] Right 09/21/2021    confirmatory medial branch blocks at RIGHT C2/C3 (TON), C3/C4, and C4/C5 performed by Rangel Baires DO at [address] [facility] Right 08/23/2022    Right  cervical  radiofrequency ablation  RIGHT medial branches Cervical 2/ 3 (TON), 3/4,  4/5 performed by Rangel Baires DO at [facility] [address])  2011    IGS ENDO SINUS SX  07/12/2017    IGS Endoscopic Ethmoidectomy, Anterior and Posterior Bilateral, Bilateral Maxillary Antrostomy, Julisa Bullosa Resection Left Middle Turbinate Septoplasty by Dr Anita Obregon Right 11/09/2021    thermal radiofrequency ablation at RIGHT medial branches C2/C3 (TON), C3/C4, and C4/C5 was chosen.  performed by Rangel Baires DO at [address] [place] [address]     3rd occipital lobe injections 2/2022    TONSILLECTOMY AND ADENOIDECTOMY  1989    TOOTH EXTRACTION      TUBAL LIGATION  2005     Family History   Problem Relation Age of Onset    Depression Mother     Migraines Mother     Cancer Father         Prostate and Testicular Cancer    Depression Father     Heart Disease Father     Migraines Father     Heart Disease Maternal Grandfather     Heart Disease Paternal Grandfather      Social History     Tobacco Use    Smoking status: Every Day     Packs/day: 0.25     Types: Cigarettes    Smokeless tobacco: Never   Substance Use Topics    Alcohol use: No     Alcohol/week: 0.0 standard drinks       Subjective:      Review of Systems   Constitutional:  Negative for activity change, appetite change, chills, diaphoresis, fatigue, fever and unexpected weight change. HENT:  Negative for congestion, dental problem, ear discharge, ear pain, facial swelling, hearing loss, mouth sores, nosebleeds, postnasal drip, rhinorrhea, sinus pressure, sneezing, sore throat, tinnitus, trouble swallowing and voice change. Eyes:  Negative for visual disturbance. Respiratory:  Negative for apnea, cough, choking, chest tightness, shortness of breath, wheezing and stridor. Cardiovascular:  Negative for chest pain, palpitations and leg swelling. Gastrointestinal:  Negative for abdominal pain, diarrhea, nausea and vomiting. Endocrine: Negative for cold intolerance, heat intolerance, polydipsia and polyuria. Genitourinary:  Negative for dysuria, enuresis and hematuria. Musculoskeletal:  Negative for arthralgias, gait problem, neck pain and neck stiffness. Skin:  Negative for color change and rash. Allergic/Immunologic: Negative for environmental allergies, food allergies and immunocompromised state. Neurological:  Positive for dizziness and light-headedness. Negative for syncope, facial asymmetry, speech difficulty and headaches. Hematological:  Negative for adenopathy. Does not bruise/bleed easily. Psychiatric/Behavioral:  Negative for confusion and sleep disturbance. The patient is not nervous/anxious. Objective:   /70 (Site: Left Upper Arm, Position: Sitting)   Pulse 68   Temp 97.4 °F (36.3 °C) (Infrared)   Ht 5' (1.524 m)   Wt 143 lb 3.2 oz (65 kg)   SpO2 97%   BMI 27.97 kg/m²     Physical Exam    Data:  All of the past medical history, past surgical history, family history,social history, allergies and current medications were reviewed with the patient. Assessment & Plan   Diagnoses and all orders for this visit:     Diagnosis Orders   1. Asymmetrical sensorineural hearing loss  Hearing aid biaural evaluation    Audiometry with tympanometry      2. Impaired auditory discrimination, right  Hearing aid biaural evaluation    Audiometry with tympanometry          The findings were explained and her questions were answered. MRI is reviewed and I agree. She has significant hearing loss and she will consider hearing aid evaluation. Niesha Sorto. Tracey Dickey MD    **This report has been created using voice recognition software. It may contain minor errors which are inherent in voicerecognition technology. **

## 2022-12-15 DIAGNOSIS — F43.9 TRAUMA AND STRESSOR-RELATED DISORDER: ICD-10-CM

## 2022-12-15 RX ORDER — CLONAZEPAM 1 MG/1
TABLET ORAL
Qty: 30 TABLET | Refills: 2 | Status: SHIPPED | OUTPATIENT
Start: 2022-12-15 | End: 2023-01-19 | Stop reason: SDUPTHER

## 2022-12-19 ENCOUNTER — HOSPITAL ENCOUNTER (OUTPATIENT)
Dept: AUDIOLOGY | Age: 42
Discharge: HOME | End: 2022-12-19

## 2022-12-19 PROCEDURE — 9990000010 HC NO CHARGE VISIT: Performed by: AUDIOLOGIST

## 2022-12-19 NOTE — PROGRESS NOTES
HEARING AID EVALUATION: Discussed hearing aid options with the patient. Reviewed styles and technology. Decided on Phonak Audeo P30 in P1 color with size 0  for the right ear. Patient was informed that she does not meet [facility] PTA guidelines. Prior authorization will be submitted on the patient's behalf, but she is aware that she does not meet the PTA criteria.

## 2022-12-22 RX ORDER — KETOROLAC TROMETHAMINE 10 MG/1
10 TABLET, FILM COATED ORAL EVERY 8 HOURS PRN
Qty: 20 TABLET | Refills: 0 | Status: SHIPPED | OUTPATIENT
Start: 2022-12-22

## 2022-12-28 ENCOUNTER — OFFICE VISIT (OUTPATIENT)
Dept: PHYSICAL MEDICINE AND REHAB | Age: 42
End: 2022-12-28
Payer: MEDICARE

## 2022-12-28 VITALS
BODY MASS INDEX: 28.07 KG/M2 | WEIGHT: 143 LBS | HEIGHT: 60 IN | DIASTOLIC BLOOD PRESSURE: 64 MMHG | SYSTOLIC BLOOD PRESSURE: 110 MMHG

## 2022-12-28 DIAGNOSIS — G56.01 CARPAL TUNNEL SYNDROME OF RIGHT WRIST: Primary | ICD-10-CM

## 2022-12-28 DIAGNOSIS — M79.2 NEUROPATHIC PAIN: ICD-10-CM

## 2022-12-28 DIAGNOSIS — M47.812 SPONDYLOSIS OF CERVICAL REGION WITHOUT MYELOPATHY OR RADICULOPATHY: ICD-10-CM

## 2022-12-28 DIAGNOSIS — G44.86 CERVICOGENIC HEADACHE: ICD-10-CM

## 2022-12-28 PROCEDURE — G8484 FLU IMMUNIZE NO ADMIN: HCPCS | Performed by: ANESTHESIOLOGY

## 2022-12-28 PROCEDURE — G8417 CALC BMI ABV UP PARAM F/U: HCPCS | Performed by: ANESTHESIOLOGY

## 2022-12-28 PROCEDURE — G8427 DOCREV CUR MEDS BY ELIG CLIN: HCPCS | Performed by: ANESTHESIOLOGY

## 2022-12-28 PROCEDURE — 4004F PT TOBACCO SCREEN RCVD TLK: CPT | Performed by: ANESTHESIOLOGY

## 2022-12-28 PROCEDURE — 99214 OFFICE O/P EST MOD 30 MIN: CPT | Performed by: ANESTHESIOLOGY

## 2022-12-28 RX ORDER — PREDNISONE 10 MG/1
TABLET ORAL
Qty: 30 TABLET | Refills: 0 | Status: SHIPPED | OUTPATIENT
Start: 2022-12-28
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
## 2022-12-29 ENCOUNTER — TELEPHONE (OUTPATIENT)
Dept: AUDIOLOGY | Age: 42
End: 2022-12-29

## 2022-12-29 NOTE — TELEPHONE ENCOUNTER
[place] [employer] approved hearing aid. Placed hearing aid order today. CM- please call patient to schedule HAF. Thanks!

## 2023-01-13 ENCOUNTER — HOSPITAL ENCOUNTER (OUTPATIENT)
Dept: AUDIOLOGY | Age: 43
Discharge: HOME | End: 2023-01-13
Payer: MEDICARE

## 2023-01-13 PROCEDURE — V5160 DISPENSING FEE BINAURAL: HCPCS | Performed by: AUDIOLOGIST

## 2023-01-13 PROCEDURE — V5257 HEARING AID, DIGIT, MON, BTE: HCPCS | Performed by: AUDIOLOGIST

## 2023-01-13 NOTE — PROGRESS NOTES
[facility]#: [id number]   ACCT#: [de-identified]  PAYOR: [employer]  Number of visits allowed: unlimited while in warranty  Charge for follow up visits: n/a    DIAGNOSIS: Sensorineural hearing loss right ear with unrestricted hearing in contralateral ear. NEW HEARING AID FITTING: A Phonak Audeo P30-R MARINO hearing aid was fit and dispensed for the right ear. Explained care, use and insertion/removal.  Programmed. Did not pair the hearing aid with her phone at this time- may do so at the follow up visit. Hearing aid fitting recheck scheduled for 1/30/23. SPEECH MAPPING:    The [employer] real ear system was used to perform verification of Lakisha's hearing aid fitting. The output of Lakisha's Phonak Audeo P30-R amplification was programmed to match appropriate NAL-NAL2 targets for MPO, soft, medium and loud stimuli utilizing speech mapping based on her 8/5/2022 audiogram.    Speech mapping results suggest: appropriate outcomes with amplification set to NAL-NAL2 target.

## 2023-01-19 DIAGNOSIS — F43.9 TRAUMA AND STRESSOR-RELATED DISORDER: ICD-10-CM

## 2023-01-19 RX ORDER — KETOROLAC TROMETHAMINE 10 MG/1
10 TABLET, FILM COATED ORAL EVERY 8 HOURS PRN
Qty: 20 TABLET | Refills: 0 | OUTPATIENT
Start: 2023-01-19

## 2023-01-19 RX ORDER — CLONAZEPAM 1 MG/1
1 TABLET ORAL NIGHTLY PRN
Qty: 30 TABLET | Refills: 0 | Status: SHIPPED | OUTPATIENT
Start: 2023-01-19 | End: 2023-02-21 | Stop reason: SDUPTHER

## 2023-01-25 ENCOUNTER — OFFICE VISIT (OUTPATIENT)
Dept: PHYSICAL MEDICINE AND REHAB | Age: 43
End: 2023-01-25

## 2023-01-25 VITALS
DIASTOLIC BLOOD PRESSURE: 74 MMHG | SYSTOLIC BLOOD PRESSURE: 132 MMHG | WEIGHT: 143 LBS | BODY MASS INDEX: 28.07 KG/M2 | HEIGHT: 60 IN

## 2023-01-25 DIAGNOSIS — G44.86 CERVICOGENIC HEADACHE: ICD-10-CM

## 2023-01-25 DIAGNOSIS — M79.2 NEUROPATHIC PAIN: ICD-10-CM

## 2023-01-25 DIAGNOSIS — M79.18 MYOFASCIAL PAIN: ICD-10-CM

## 2023-01-25 DIAGNOSIS — G56.02 CARPAL TUNNEL SYNDROME ON LEFT: Primary | ICD-10-CM

## 2023-01-25 RX ORDER — TRIAMCINOLONE ACETONIDE 40 MG/ML
40 INJECTION, SUSPENSION INTRA-ARTICULAR; INTRAMUSCULAR ONCE
Status: COMPLETED | OUTPATIENT
Start: 2023-01-25 | End: 2023-01-25

## 2023-01-25 RX ADMIN — TRIAMCINOLONE ACETONIDE 40 MG: 40 INJECTION, SUSPENSION INTRA-ARTICULAR; INTRAMUSCULAR at 15:37
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
## 2023-01-25 NOTE — PROGRESS NOTES
Pre-operative Diagnosis:  Right myofascial neck pain     Post-operative Diagnosis: Right myofascial neck pain     Procedure: Trigger point injection(s)     Procedure Description:  After having signed the informed consent, the patient was seated in a chair. The patient's right neck and shoulder region was prepped with alcohol wipes. Trigger points were identified in the right cervical paraspinal and trapezius muscles for a total of 10 trigger point injections. After negative aspiration, 1 cc of a mixture containing 1:10 40 mg kenalo.25% bupivacaine was injected at each trigger point for a total of 10 trigger points. The patient tolerated the procedure well.      Procedural Complications: None        Pablo Gordon DO  Interventional Pain Management/PM&R   [place]

## 2023-01-26 RX ORDER — KETOROLAC TROMETHAMINE 10 MG/1
10 TABLET, FILM COATED ORAL EVERY 8 HOURS PRN
Qty: 20 TABLET | Refills: 0 | Status: SHIPPED | OUTPATIENT
Start: 2023-01-26

## 2023-02-03 ENCOUNTER — PATIENT MESSAGE (OUTPATIENT)
Dept: FAMILY MEDICINE CLINIC | Age: 43
End: 2023-02-03

## 2023-02-03 DIAGNOSIS — K04.7 DENTAL INFECTION: Primary | ICD-10-CM

## 2023-02-03 RX ORDER — AMOXICILLIN 875 MG/1
875 TABLET, COATED ORAL 2 TIMES DAILY
Qty: 20 TABLET | Refills: 0 | Status: SHIPPED | OUTPATIENT
Start: 2023-02-03 | End: 2023-02-13

## 2023-02-03 NOTE — TELEPHONE ENCOUNTER
From: Jennifer Lawson  To: Efrain Boss  Sent: 2/3/2023 8:55 AM EST  Subject: Broken tooth    My dentist retired so Im looking at having to go to [facility] but cant get an appointment soon n my tooth broke more n got infected.  If possible I need amoxicillin

## 2023-02-06 ENCOUNTER — PATIENT MESSAGE (OUTPATIENT)
Dept: FAMILY MEDICINE CLINIC | Age: 43
End: 2023-02-06

## 2023-02-06 DIAGNOSIS — M79.18 CERVICAL MYOFASCIAL PAIN SYNDROME: ICD-10-CM

## 2023-02-06 DIAGNOSIS — G44.86 CERVICOGENIC HEADACHE: Primary | ICD-10-CM

## 2023-02-07 ENCOUNTER — PATIENT MESSAGE (OUTPATIENT)
Dept: FAMILY MEDICINE CLINIC | Age: 43
End: 2023-02-07

## 2023-02-07 ENCOUNTER — HOSPITAL ENCOUNTER (OUTPATIENT)
Dept: AUDIOLOGY | Age: 43
Discharge: HOME | End: 2023-02-07

## 2023-02-07 DIAGNOSIS — M79.18 CERVICAL MYOFASCIAL PAIN SYNDROME: Primary | ICD-10-CM

## 2023-02-07 DIAGNOSIS — G44.86 CERVICOGENIC HEADACHE: ICD-10-CM

## 2023-02-07 DIAGNOSIS — M54.81 BILATERAL OCCIPITAL NEURALGIA: ICD-10-CM

## 2023-02-07 PROCEDURE — 9990000010 HC NO CHARGE VISIT: Performed by: AUDIOLOGIST

## 2023-02-07 RX ORDER — METHOCARBAMOL 500 MG/1
500 TABLET, FILM COATED ORAL 3 TIMES DAILY
Qty: 90 TABLET | Refills: 0 | Status: SHIPPED | OUTPATIENT
Start: 2023-02-07 | End: 2023-03-09

## 2023-02-07 NOTE — TELEPHONE ENCOUNTER
From: Walker Don  To: Isauro Samuels  Sent: 2/6/2023 11:53 PM EST  Subject: Muscle relaxers not working    The tizanadine is not helping anymore. The pain and stiffness in my neck is continuing to get worse and the ones Im on dont help anymore. Is there a different medication I can take?  Im in so much pain it isnt funny

## 2023-02-07 NOTE — PROGRESS NOTES
TWO WEEK CHECK: The patient is doing well with the new hearing aid. Decreased overall gain to 90% per patient request. Paired the patient's hearing aid to her phone and the [employer] charlie- practiced appropriate use. Scheduled annual hearing aid check for 1/15/2024. Will see patient sooner if any problems arise.

## 2023-02-07 NOTE — TELEPHONE ENCOUNTER
Attempted to fax referral to Dr. Jamel Wolf, left  message at # [phone] for fax# . Referral submitted online.     Awaiting call back for fax# to fax records to

## 2023-02-07 NOTE — TELEPHONE ENCOUNTER
From: Letha Valerio  To: Speedy Hood  Sent: 2/7/2023 11:39 AM EST  Subject: New pain management doctor in [address]    Can we find a new pain management doctor.  is not helping much and the injections are not helping. Im spending more time at hospitals than I should.  Im tired of being in pain

## 2023-02-08 ENCOUNTER — TELEPHONE (OUTPATIENT)
Dept: FAMILY MEDICINE CLINIC | Age: 43
End: 2023-02-08

## 2023-02-08 NOTE — TELEPHONE ENCOUNTER
PA request through CoverMymeds for the patients vilazodone 40mg tabs submitted. Waiting on determination.

## 2023-02-13 NOTE — TELEPHONE ENCOUNTER
[facility] fax received, APPROVAL on viibryd 40 mg tablets. Coverage 2/8/23-2/7/24. PA number: [id number]. [employer] [facility] notified via detailed VM.

## 2023-02-21 ENCOUNTER — PATIENT MESSAGE (OUTPATIENT)
Dept: FAMILY MEDICINE CLINIC | Age: 43
End: 2023-02-21

## 2023-02-21 DIAGNOSIS — F43.9 TRAUMA AND STRESSOR-RELATED DISORDER: ICD-10-CM

## 2023-02-21 RX ORDER — CLONAZEPAM 1 MG/1
1 TABLET ORAL NIGHTLY PRN
Qty: 30 TABLET | Refills: 0 | Status: SHIPPED | OUTPATIENT
Start: 2023-02-21 | End: 2023-03-23

## 2023-02-21 NOTE — TELEPHONE ENCOUNTER
From: Anice Elbert  To: Dru Pitt  Sent: 2/21/2023 7:27 AM EST  Subject: Pain in right side seeing new pain management doctor March 2    Can I get toroidal to help until I see my new pain management doctor March 2. My right side of my neck n shoulder is so tight the chiropractor cant get it to release either.

## 2023-02-27 RX ORDER — KETOROLAC TROMETHAMINE 10 MG/1
10 TABLET, FILM COATED ORAL EVERY 8 HOURS PRN
Qty: 20 TABLET | Refills: 0 | Status: SHIPPED | OUTPATIENT
Start: 2023-02-27

## 2023-03-26 DIAGNOSIS — G44.86 CERVICOGENIC HEADACHE: ICD-10-CM

## 2023-03-26 DIAGNOSIS — M79.18 CERVICAL MYOFASCIAL PAIN SYNDROME: ICD-10-CM

## 2023-03-27 RX ORDER — METHOCARBAMOL 500 MG/1
TABLET, FILM COATED ORAL
Qty: 90 TABLET | Refills: 5 | Status: SHIPPED | OUTPATIENT
Start: 2023-03-27

## 2023-03-30 RX ORDER — KETOROLAC TROMETHAMINE 10 MG/1
10 TABLET, FILM COATED ORAL EVERY 8 HOURS PRN
Qty: 20 TABLET | Refills: 0 | OUTPATIENT
Start: 2023-03-30

## 2023-04-05 DIAGNOSIS — R51.9 CHRONIC NONINTRACTABLE HEADACHE, UNSPECIFIED HEADACHE TYPE: ICD-10-CM

## 2023-04-05 DIAGNOSIS — G89.29 CHRONIC NONINTRACTABLE HEADACHE, UNSPECIFIED HEADACHE TYPE: ICD-10-CM

## 2023-04-05 RX ORDER — GABAPENTIN 800 MG/1
TABLET ORAL
Qty: 360 TABLET | Refills: 0 | OUTPATIENT
Start: 2023-04-05

## 2023-04-27 DIAGNOSIS — R51.9 CHRONIC NONINTRACTABLE HEADACHE, UNSPECIFIED HEADACHE TYPE: ICD-10-CM

## 2023-04-27 DIAGNOSIS — J42 CHRONIC BRONCHITIS, UNSPECIFIED CHRONIC BRONCHITIS TYPE (HCC): ICD-10-CM

## 2023-04-27 DIAGNOSIS — G89.29 CHRONIC NONINTRACTABLE HEADACHE, UNSPECIFIED HEADACHE TYPE: ICD-10-CM

## 2023-04-28 ENCOUNTER — TELEPHONE (OUTPATIENT)
Dept: FAMILY MEDICINE CLINIC | Age: 43
End: 2023-04-28

## 2023-04-28 RX ORDER — ALBUTEROL SULFATE 90 UG/1
1-2 AEROSOL, METERED RESPIRATORY (INHALATION) EVERY 6 HOURS PRN
Qty: 18 G | Refills: 3 | Status: SHIPPED | OUTPATIENT
Start: 2023-04-28

## 2023-04-28 RX ORDER — DILTIAZEM HYDROCHLORIDE 60 MG/1
TABLET, FILM COATED ORAL
Qty: 11 G | Refills: 11 | Status: SHIPPED | OUTPATIENT
Start: 2023-04-28

## 2023-04-28 NOTE — TELEPHONE ENCOUNTER
Pharmacy called and wanted to check patient medications for muscle relaxants. She stated that last month patient filled the methocarbamol and then also Tizanidine 4 mg every 8 hrs. She stated that she got a refill request on the Tizanidine again and wanted to know if she was to be on them both or not. Please advise.

## 2023-05-01 RX ORDER — GABAPENTIN 800 MG/1
800 TABLET ORAL 4 TIMES DAILY
Qty: 360 TABLET | Refills: 1 | OUTPATIENT
Start: 2023-05-01 | End: 2023-10-28

## 2023-06-02 DIAGNOSIS — R51.9 CHRONIC NONINTRACTABLE HEADACHE, UNSPECIFIED HEADACHE TYPE: ICD-10-CM

## 2023-06-02 DIAGNOSIS — G89.29 CHRONIC NONINTRACTABLE HEADACHE, UNSPECIFIED HEADACHE TYPE: ICD-10-CM

## 2023-06-05 RX ORDER — GABAPENTIN 800 MG/1
TABLET ORAL
Qty: 360 TABLET | Refills: 0 | OUTPATIENT
Start: 2023-06-05

## 2023-07-04 DIAGNOSIS — F43.9 TRAUMA AND STRESSOR-RELATED DISORDER: ICD-10-CM

## 2023-07-05 RX ORDER — CLONAZEPAM 1 MG/1
TABLET ORAL
Qty: 30 TABLET | Refills: 0 | Status: SHIPPED | OUTPATIENT
Start: 2023-07-05 | End: 2023-08-04

## 2023-08-25 DIAGNOSIS — F43.9 TRAUMA AND STRESSOR-RELATED DISORDER: ICD-10-CM

## 2023-08-28 RX ORDER — CLONAZEPAM 1 MG/1
TABLET ORAL
Qty: 30 TABLET | Refills: 0 | OUTPATIENT
Start: 2023-08-28

## 2023-09-14 DIAGNOSIS — J42 CHRONIC BRONCHITIS, UNSPECIFIED CHRONIC BRONCHITIS TYPE (HCC): ICD-10-CM

## 2023-09-14 RX ORDER — ALBUTEROL SULFATE 90 UG/1
AEROSOL, METERED RESPIRATORY (INHALATION)
Qty: 18 G | Refills: 0 | Status: SHIPPED | OUTPATIENT
Start: 2023-09-14

## 2023-09-29 DIAGNOSIS — F43.9 TRAUMA AND STRESSOR-RELATED DISORDER: ICD-10-CM

## 2023-09-29 DIAGNOSIS — F33.2 SEVERE EPISODE OF RECURRENT MAJOR DEPRESSIVE DISORDER, WITHOUT PSYCHOTIC FEATURES (HCC): ICD-10-CM

## 2023-10-02 RX ORDER — OLANZAPINE 5 MG/1
5 TABLET ORAL NIGHTLY
Qty: 90 TABLET | Refills: 3 | Status: SHIPPED | OUTPATIENT
Start: 2023-10-02 | End: 2023-11-01

## 2023-10-02 RX ORDER — IMIPRAMINE HCL 25 MG
TABLET ORAL
Qty: 90 TABLET | Refills: 11 | Status: SHIPPED | OUTPATIENT
Start: 2023-10-02 | End: 2023-11-01

## 2023-10-02 RX ORDER — CLONAZEPAM 1 MG/1
1 TABLET ORAL NIGHTLY PRN
Qty: 30 TABLET | Refills: 0 | OUTPATIENT
Start: 2023-10-02

## 2023-11-01 ENCOUNTER — HOSPITAL ENCOUNTER (EMERGENCY)
Age: 43
Discharge: HOME | End: 2023-11-01
Payer: MEDICAID

## 2023-11-01 VITALS
TEMPERATURE: 98.3 F | OXYGEN SATURATION: 96 % | DIASTOLIC BLOOD PRESSURE: 81 MMHG | RESPIRATION RATE: 18 BRPM | HEART RATE: 89 BPM | SYSTOLIC BLOOD PRESSURE: 121 MMHG

## 2023-11-01 DIAGNOSIS — M94.0 ACUTE COSTOCHONDRITIS: Primary | ICD-10-CM

## 2023-11-01 PROCEDURE — 99213 OFFICE O/P EST LOW 20 MIN: CPT | Performed by: NURSE PRACTITIONER

## 2023-11-01 PROCEDURE — 99213 OFFICE O/P EST LOW 20 MIN: CPT

## 2023-11-01 RX ORDER — PREDNISONE 20 MG/1
TABLET ORAL
Qty: 15 TABLET | Refills: 0 | Status: SHIPPED | OUTPATIENT
Start: 2023-11-01 | End: 2023-11-11

## 2023-11-01 RX ORDER — TIZANIDINE 4 MG/1
4 TABLET ORAL EVERY 6 HOURS PRN
COMMUNITY

## 2023-11-01 ASSESSMENT — ENCOUNTER SYMPTOMS
TROUBLE SWALLOWING: 0
BLOOD IN STOOL: 0
CHOKING: 0
APNEA: 0
NAUSEA: 0
ANAL BLEEDING: 0
CONSTIPATION: 0
SHORTNESS OF BREATH: 0
VOMITING: 0
ABDOMINAL PAIN: 0
STRIDOR: 0
WHEEZING: 0
ORTHOPNEA: 0
CHEST TIGHTNESS: 0
HEARTBURN: 0
COUGH: 1
ABDOMINAL DISTENTION: 0
BACK PAIN: 0

## 2023-11-01 ASSESSMENT — PAIN SCALES - GENERAL: PAINLEVEL_OUTOF10: 9

## 2023-11-01 ASSESSMENT — PAIN DESCRIPTION - PAIN TYPE: TYPE: ACUTE PAIN

## 2023-11-01 ASSESSMENT — PAIN DESCRIPTION - LOCATION: LOCATION: BACK;RIB CAGE

## 2023-11-01 ASSESSMENT — PAIN DESCRIPTION - ORIENTATION: ORIENTATION: RIGHT

## 2023-11-01 ASSESSMENT — PAIN - FUNCTIONAL ASSESSMENT: PAIN_FUNCTIONAL_ASSESSMENT: 0-10

## 2023-11-01 ASSESSMENT — PAIN DESCRIPTION - FREQUENCY: FREQUENCY: CONTINUOUS

## 2023-11-01 NOTE — ED PROVIDER NOTES
[facility]  Urgent Care Encounter      CHIEF COMPLAINT       Chief Complaint   Patient presents with    Back Pain     Right mid back onset couple days ago, and now into her right ribs       Nurses Notes reviewed and I agree except as noted in the HPI. HISTORY OFPRESENT ILLNESS   Manual Jerica is a 44 y.o. The history is provided by the patient. No  was used.    Chest Pain  Pain location:  R lateral chest  Pain quality: shooting, stabbing and tightness    Pain quality: not aching, not burning, not crushing, not dull, not hot, no pressure, not radiating, not sharp, not tearing and not throbbing    Pain radiates to:  Mid back  Pain severity:  Severe  Onset quality:  Gradual  Duration:  1 week  Timing:  Intermittent  Progression:  Worsening  Chronicity:  New  Context: breathing, lifting, movement, raising an arm and stress    Context: not drug use, not eating, not intercourse, not at rest and not trauma    Relieved by:  Nothing  Worsened by:  Coughing, movement and deep breathing  Ineffective treatments:  Certain positions, rest and leaning forward  Associated symptoms: cough    Associated symptoms: no abdominal pain, no AICD problem, no altered mental status, no anorexia, no anxiety, no back pain, no claudication, no diaphoresis, no dizziness, no dysphagia, no fatigue, no fever, no headache, no heartburn, no lower extremity edema, no nausea, no near-syncope, no numbness, no orthopnea, no palpitations, no PND, no shortness of breath, no syncope, no vomiting and no weakness    Risk factors: smoking    Risk factors: no aortic disease, no birth control, no coronary artery disease, no diabetes mellitus, no Amy-Danlos syndrome, no high cholesterol, no hypertension, no immobilization, not male, no Marfan's syndrome, not obese, not pregnant, no prior DVT/PE and no surgery        REVIEW OF SYSTEMS     Review of Systems   Constitutional:  Negative for activity change, appetite

## 2023-11-02 ENCOUNTER — TELEPHONE (OUTPATIENT)
Dept: FAMILY MEDICINE CLINIC | Age: 43
End: 2023-11-02

## 2023-11-05 ASSESSMENT — PATIENT HEALTH QUESTIONNAIRE - PHQ9
SUM OF ALL RESPONSES TO PHQ QUESTIONS 1-9: 21
3. TROUBLE FALLING OR STAYING ASLEEP: SEVERAL DAYS
3. TROUBLE FALLING OR STAYING ASLEEP: 1
6. FEELING BAD ABOUT YOURSELF - OR THAT YOU ARE A FAILURE OR HAVE LET YOURSELF OR YOUR FAMILY DOWN: NEARLY EVERY DAY
2. FEELING DOWN, DEPRESSED OR HOPELESS: 3
4. FEELING TIRED OR HAVING LITTLE ENERGY: 3
9. THOUGHTS THAT YOU WOULD BE BETTER OFF DEAD, OR OF HURTING YOURSELF: NOT AT ALL
5. POOR APPETITE OR OVEREATING: NEARLY EVERY DAY
7. TROUBLE CONCENTRATING ON THINGS, SUCH AS READING THE NEWSPAPER OR WATCHING TELEVISION: NEARLY EVERY DAY
SUM OF ALL RESPONSES TO PHQ9 QUESTIONS 1 & 2: 6
9. THOUGHTS THAT YOU WOULD BE BETTER OFF DEAD, OR OF HURTING YOURSELF: 0
7. TROUBLE CONCENTRATING ON THINGS, SUCH AS READING THE NEWSPAPER OR WATCHING TELEVISION: 3
SUM OF ALL RESPONSES TO PHQ QUESTIONS 1-9: 21
10. IF YOU CHECKED OFF ANY PROBLEMS, HOW DIFFICULT HAVE THESE PROBLEMS MADE IT FOR YOU TO DO YOUR WORK, TAKE CARE OF THINGS AT HOME, OR GET ALONG WITH OTHER PEOPLE: VERY DIFFICULT
SUM OF ALL RESPONSES TO PHQ QUESTIONS 1-9: 21
SUM OF ALL RESPONSES TO PHQ QUESTIONS 1-9: 21
10. IF YOU CHECKED OFF ANY PROBLEMS, HOW DIFFICULT HAVE THESE PROBLEMS MADE IT FOR YOU TO DO YOUR WORK, TAKE CARE OF THINGS AT HOME, OR GET ALONG WITH OTHER PEOPLE: 2
1. LITTLE INTEREST OR PLEASURE IN DOING THINGS: 3
8. MOVING OR SPEAKING SO SLOWLY THAT OTHER PEOPLE COULD HAVE NOTICED. OR THE OPPOSITE - BEING SO FIDGETY OR RESTLESS THAT YOU HAVE BEEN MOVING AROUND A LOT MORE THAN USUAL: MORE THAN HALF THE DAYS
5. POOR APPETITE OR OVEREATING: 3
8. MOVING OR SPEAKING SO SLOWLY THAT OTHER PEOPLE COULD HAVE NOTICED. OR THE OPPOSITE, BEING SO FIGETY OR RESTLESS THAT YOU HAVE BEEN MOVING AROUND A LOT MORE THAN USUAL: 2
6. FEELING BAD ABOUT YOURSELF - OR THAT YOU ARE A FAILURE OR HAVE LET YOURSELF OR YOUR FAMILY DOWN: 3
4. FEELING TIRED OR HAVING LITTLE ENERGY: NEARLY EVERY DAY
1. LITTLE INTEREST OR PLEASURE IN DOING THINGS: NEARLY EVERY DAY
SUM OF ALL RESPONSES TO PHQ QUESTIONS 1-9: 21
2. FEELING DOWN, DEPRESSED OR HOPELESS: NEARLY EVERY DAY

## 2023-11-05 ASSESSMENT — COLUMBIA-SUICIDE SEVERITY RATING SCALE - C-SSRS
1. IN THE PAST MONTH, HAVE YOU WISHED YOU WERE DEAD OR WISHED YOU COULD GO TO SLEEP AND NOT WAKE UP?: NO
2. IN THE PAST MONTH, HAVE YOU ACTUALLY HAD ANY THOUGHTS OF KILLING YOURSELF?: NO
6. IN YOUR LIFETIME, HAVE YOU EVER DONE ANYTHING, STARTED TO DO ANYTHING, OR PREPARED TO DO ANYTHING TO END YOUR LIFE?: NO

## 2023-11-06 ENCOUNTER — OFFICE VISIT (OUTPATIENT)
Dept: FAMILY MEDICINE CLINIC | Age: 43
End: 2023-11-06
Payer: MEDICAID

## 2023-11-06 VITALS
RESPIRATION RATE: 18 BRPM | BODY MASS INDEX: 30.29 KG/M2 | WEIGHT: 155.1 LBS | HEART RATE: 60 BPM | DIASTOLIC BLOOD PRESSURE: 78 MMHG | SYSTOLIC BLOOD PRESSURE: 110 MMHG

## 2023-11-06 DIAGNOSIS — Z13.220 SCREENING FOR HYPERLIPIDEMIA: ICD-10-CM

## 2023-11-06 DIAGNOSIS — Z13.1 SCREENING FOR DIABETES MELLITUS: ICD-10-CM

## 2023-11-06 DIAGNOSIS — F17.200 SMOKING: ICD-10-CM

## 2023-11-06 DIAGNOSIS — F33.2 SEVERE EPISODE OF RECURRENT MAJOR DEPRESSIVE DISORDER, WITHOUT PSYCHOTIC FEATURES (HCC): Primary | ICD-10-CM

## 2023-11-06 DIAGNOSIS — G44.86 CERVICOGENIC HEADACHE: ICD-10-CM

## 2023-11-06 DIAGNOSIS — F43.9 TRAUMA AND STRESSOR-RELATED DISORDER: ICD-10-CM

## 2023-11-06 DIAGNOSIS — M54.81 BILATERAL OCCIPITAL NEURALGIA: ICD-10-CM

## 2023-11-06 DIAGNOSIS — M79.18 CERVICAL MYOFASCIAL PAIN SYNDROME: ICD-10-CM

## 2023-11-06 PROBLEM — M79.642 LEFT HAND PAIN: Status: ACTIVE | Noted: 2023-11-06

## 2023-11-06 PROCEDURE — 99214 OFFICE O/P EST MOD 30 MIN: CPT | Performed by: NURSE PRACTITIONER

## 2023-11-06 RX ORDER — VILAZODONE HYDROCHLORIDE 20 MG/1
20 TABLET ORAL DAILY
Qty: 30 TABLET | Refills: 0 | Status: SHIPPED | OUTPATIENT
Start: 2023-11-06

## 2023-11-06 SDOH — ECONOMIC STABILITY: INCOME INSECURITY: HOW HARD IS IT FOR YOU TO PAY FOR THE VERY BASICS LIKE FOOD, HOUSING, MEDICAL CARE, AND HEATING?: NOT HARD AT ALL

## 2023-11-06 SDOH — ECONOMIC STABILITY: FOOD INSECURITY: WITHIN THE PAST 12 MONTHS, THE FOOD YOU BOUGHT JUST DIDN'T LAST AND YOU DIDN'T HAVE MONEY TO GET MORE.: NEVER TRUE

## 2023-11-06 SDOH — ECONOMIC STABILITY: FOOD INSECURITY: WITHIN THE PAST 12 MONTHS, YOU WORRIED THAT YOUR FOOD WOULD RUN OUT BEFORE YOU GOT MONEY TO BUY MORE.: NEVER TRUE

## 2023-11-06 SDOH — ECONOMIC STABILITY: HOUSING INSECURITY
IN THE LAST 12 MONTHS, WAS THERE A TIME WHEN YOU DID NOT HAVE A STEADY PLACE TO SLEEP OR SLEPT IN A SHELTER (INCLUDING NOW)?: NO

## 2023-11-06 ASSESSMENT — ENCOUNTER SYMPTOMS
NAUSEA: 0
COUGH: 0
SHORTNESS OF BREATH: 0
ABDOMINAL PAIN: 0
BACK PAIN: 1
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
## 2023-11-07 ENCOUNTER — TELEPHONE (OUTPATIENT)
Dept: FAMILY MEDICINE CLINIC | Age: 43
End: 2023-11-07

## 2023-11-07 NOTE — TELEPHONE ENCOUNTER
PA approval received through fax for both medication from 11/7/23 through 11/5/2024.     Pharmacy notified [facility]: [place]

## 2023-11-07 NOTE — TELEPHONE ENCOUNTER
PA request received from pharmacy for Vilazodone HCL 20mg #30 for 30 days. PA submitted online at [email or web address] and pending review.

## 2023-11-07 NOTE — TELEPHONE ENCOUNTER
PA request received from pharmacy for Rexulti 0.5mg #30 for 30 days  PA submitted online at [email or web address] and pending review.

## 2023-11-10 DIAGNOSIS — F43.9 TRAUMA AND STRESSOR-RELATED DISORDER: ICD-10-CM

## 2023-11-13 RX ORDER — CLONAZEPAM 1 MG/1
1 TABLET ORAL NIGHTLY PRN
Qty: 30 TABLET | Refills: 0 | OUTPATIENT
Start: 2023-11-13

## 2023-11-16 DIAGNOSIS — F43.9 TRAUMA AND STRESSOR-RELATED DISORDER: ICD-10-CM

## 2023-11-16 RX ORDER — CLONAZEPAM 1 MG/1
1 TABLET ORAL NIGHTLY PRN
Qty: 30 TABLET | Refills: 0 | Status: SHIPPED | OUTPATIENT
Start: 2023-11-16 | End: 2023-12-16

## 2024-01-05 ENCOUNTER — TELEPHONE (OUTPATIENT)
Dept: FAMILY MEDICINE CLINIC | Age: 44
End: 2024-01-05

## 2024-01-05 NOTE — TELEPHONE ENCOUNTER
Pt called office stating she has moved to [place]. Pt wanted you to know she is grateful to of had you as her PCP.

## 2025-04-08 ENCOUNTER — PATIENT MESSAGE (OUTPATIENT)
Dept: ENT CLINIC | Age: 45
End: 2025-04-08

## (undated) DEVICE — HYPODERMIC SAFETY NEEDLE: Brand: MAGELLAN

## (undated) DEVICE — 1840 FOAM BLOCK NEEDLE COUNTER: Brand: DEVON

## (undated) DEVICE — MARKER,SKIN,WI/RULER AND LABELS: Brand: MEDLINE

## (undated) DEVICE — NEEDLE HYPO 18GA L1.5IN THN WALL PIVOTING SHLD BVL ORIENTED

## (undated) DEVICE — GAUZE SPONGES,USP TYPE VII GAUZE, 12 PLY: Brand: CURITY

## (undated) DEVICE — TOWEL,OR,DSP,ST,BLUE,STD,4/PK,20PK/CS: Brand: MEDLINE

## (undated) DEVICE — SYRINGE MED 3ML CLR PLAS STD N CTRL LUERLOCK TIP DISP

## (undated) DEVICE — 6 ML SYRINGE LUER-LOCK TIP: Brand: MONOJECT

## (undated) DEVICE — GLOVE BIOGEL POWDER FREE SZ 8

## (undated) DEVICE — SYRINGE MED 10ML LUERLOCK TIP W/O SFTY DISP

## (undated) DEVICE — NEEDLE SPNL 22GA L3.5IN BLK HUB S STL REG WALL FIT STYL W/